# Patient Record
Sex: FEMALE | Race: OTHER | HISPANIC OR LATINO | Employment: UNEMPLOYED | URBAN - METROPOLITAN AREA
[De-identification: names, ages, dates, MRNs, and addresses within clinical notes are randomized per-mention and may not be internally consistent; named-entity substitution may affect disease eponyms.]

---

## 2017-01-09 ENCOUNTER — TRANSCRIBE ORDERS (OUTPATIENT)
Dept: ADMINISTRATIVE | Facility: HOSPITAL | Age: 57
End: 2017-01-09

## 2017-01-09 DIAGNOSIS — Z12.31 SCREENING MAMMOGRAM FOR HIGH-RISK PATIENT: Primary | ICD-10-CM

## 2017-01-10 ENCOUNTER — ALLSCRIPTS OFFICE VISIT (OUTPATIENT)
Dept: OTHER | Facility: OTHER | Age: 57
End: 2017-01-10

## 2017-01-10 DIAGNOSIS — M79.10 MYALGIA: ICD-10-CM

## 2017-01-10 DIAGNOSIS — M54.12 RADICULOPATHY OF CERVICAL REGION: ICD-10-CM

## 2017-02-10 ENCOUNTER — HOSPITAL ENCOUNTER (OUTPATIENT)
Dept: MAMMOGRAPHY | Facility: HOSPITAL | Age: 57
Discharge: HOME | End: 2017-02-10
Payer: COMMERCIAL

## 2017-02-10 DIAGNOSIS — Z12.31 SCREENING MAMMOGRAM FOR HIGH-RISK PATIENT: ICD-10-CM

## 2017-02-10 PROCEDURE — G0202 SCR MAMMO BI INCL CAD: HCPCS

## 2017-02-15 ENCOUNTER — GENERIC CONVERSION - ENCOUNTER (OUTPATIENT)
Dept: OTHER | Facility: OTHER | Age: 57
End: 2017-02-15

## 2018-01-13 VITALS
HEART RATE: 64 BPM | SYSTOLIC BLOOD PRESSURE: 110 MMHG | DIASTOLIC BLOOD PRESSURE: 60 MMHG | HEIGHT: 66 IN | BODY MASS INDEX: 27.64 KG/M2 | WEIGHT: 172 LBS | RESPIRATION RATE: 14 BRPM

## 2018-05-03 LAB
BILIRUB UR QL STRIP: NEGATIVE MG/DL
CLARITY UR: CLEAR
COLOR UR: YELLOW
COMMENT (HISTORICAL): NORMAL
GLUCOSE UR STRIP-MCNC: NEGATIVE MG/DL
HGB UR QL STRIP.AUTO: NEGATIVE
KETONES UR STRIP-MCNC: NEGATIVE MG/DL
LEUKOCYTE ESTERASE UR QL STRIP: NEGATIVE
NITRITE UR QL STRIP: NEGATIVE
PH UR STRIP.AUTO: 5 [PH] (ref 4.5–8)
PROT UR STRIP-MCNC: NEGATIVE MG/DL
SP GR UR STRIP.AUTO: 1.02 (ref 1–1.04)
UROBILINOGEN UR QL STRIP.AUTO: NEGATIVE MG/DL (ref 0–1)

## 2018-07-02 ENCOUNTER — TRANSCRIBE ORDERS (OUTPATIENT)
Dept: ADMINISTRATIVE | Facility: HOSPITAL | Age: 58
End: 2018-07-02

## 2018-07-02 DIAGNOSIS — Z12.31 VISIT FOR SCREENING MAMMOGRAM: Primary | ICD-10-CM

## 2018-07-05 ENCOUNTER — HOSPITAL ENCOUNTER (OUTPATIENT)
Dept: MAMMOGRAPHY | Facility: HOSPITAL | Age: 58
Discharge: HOME | End: 2018-07-05
Payer: COMMERCIAL

## 2018-07-05 DIAGNOSIS — Z12.31 VISIT FOR SCREENING MAMMOGRAM: ICD-10-CM

## 2018-07-05 PROCEDURE — 77067 SCR MAMMO BI INCL CAD: CPT

## 2018-12-09 PROBLEM — M79.18 CERVICAL MYOFASCIAL PAIN SYNDROME: Status: ACTIVE | Noted: 2017-01-10

## 2018-12-09 PROBLEM — Z79.1 ENCOUNTER FOR MONITORING CHRONIC NSAID THERAPY: Status: ACTIVE | Noted: 2017-07-06

## 2018-12-09 PROBLEM — T39.395A GASTRITIS DUE TO NONSTEROIDAL ANTI-INFLAMMATORY DRUG: Status: ACTIVE | Noted: 2017-07-06

## 2018-12-09 PROBLEM — Z51.81 ENCOUNTER FOR MONITORING CHRONIC NSAID THERAPY: Status: ACTIVE | Noted: 2017-07-06

## 2018-12-09 PROBLEM — M48.02 CERVICAL STENOSIS OF SPINAL CANAL: Status: ACTIVE | Noted: 2017-01-10

## 2018-12-09 PROBLEM — K29.60 GASTRITIS DUE TO NONSTEROIDAL ANTI-INFLAMMATORY DRUG: Status: ACTIVE | Noted: 2017-07-06

## 2018-12-10 ENCOUNTER — OFFICE VISIT (OUTPATIENT)
Dept: FAMILY MEDICINE CLINIC | Facility: CLINIC | Age: 58
End: 2018-12-10
Payer: COMMERCIAL

## 2018-12-10 VITALS
DIASTOLIC BLOOD PRESSURE: 70 MMHG | HEIGHT: 64 IN | WEIGHT: 173 LBS | SYSTOLIC BLOOD PRESSURE: 120 MMHG | BODY MASS INDEX: 29.53 KG/M2 | TEMPERATURE: 98.2 F | HEART RATE: 78 BPM | OXYGEN SATURATION: 99 %

## 2018-12-10 DIAGNOSIS — Z28.21 IMMUNIZATION REFUSED: ICD-10-CM

## 2018-12-10 DIAGNOSIS — R29.898 WEAKNESS OF RIGHT ARM: ICD-10-CM

## 2018-12-10 DIAGNOSIS — Z23 NEED FOR SHINGLES VACCINE: ICD-10-CM

## 2018-12-10 DIAGNOSIS — E66.3 OVERWEIGHT (BMI 25.0-29.9): ICD-10-CM

## 2018-12-10 DIAGNOSIS — E78.2 MIXED HYPERLIPIDEMIA: ICD-10-CM

## 2018-12-10 DIAGNOSIS — Z23 NEED FOR INFLUENZA VACCINATION: ICD-10-CM

## 2018-12-10 DIAGNOSIS — Z00.01 ENCOUNTER FOR WELL ADULT EXAM WITH ABNORMAL FINDINGS: Primary | ICD-10-CM

## 2018-12-10 DIAGNOSIS — Z23 NEED FOR TDAP VACCINATION: ICD-10-CM

## 2018-12-10 PROCEDURE — 99214 OFFICE O/P EST MOD 30 MIN: CPT | Performed by: FAMILY MEDICINE

## 2018-12-10 PROCEDURE — 1036F TOBACCO NON-USER: CPT | Performed by: FAMILY MEDICINE

## 2018-12-10 PROCEDURE — 99386 PREV VISIT NEW AGE 40-64: CPT | Performed by: FAMILY MEDICINE

## 2018-12-10 PROCEDURE — 3008F BODY MASS INDEX DOCD: CPT | Performed by: FAMILY MEDICINE

## 2018-12-10 RX ORDER — LEVOTHYROXINE SODIUM 0.12 MG/1
TABLET ORAL EVERY 24 HOURS
COMMUNITY
Start: 2016-10-07 | End: 2019-07-22 | Stop reason: SDUPTHER

## 2018-12-10 RX ORDER — ALBUTEROL SULFATE 90 UG/1
AEROSOL, METERED RESPIRATORY (INHALATION)
COMMUNITY
Start: 2014-04-21 | End: 2019-04-15 | Stop reason: SDUPTHER

## 2018-12-10 NOTE — PROGRESS NOTES
[facility] HEALTH MAINTENANCE OFFICE VISIT  [facility] [place] PRIMARY [facility]    NAME: Joshua Beasley  AGE: 60 y o  SEX: female  : 1960     DATE: 12/10/2018    Assessment and Plan     Problem List Items Addressed This Visit     Mixed hyperlipidemia    Relevant Orders    Comprehensive metabolic panel    Immunization refused     Patient refuses Tdap and Shingrix         Overweight (BMI 25 0-29  9)     The BMI is above average  BMI counseling and education was provided to the patient  Nutrition recommendations include reducing portion sizes, decreasing overall calorie intake, 3-5 servings of fruits/vegetables daily, reducing fast food intake, consuming healthier snacks, decreasing soda and/or juice intake, moderation in carbohydrate intake and reducing intake of saturated fat and trans fat  Exercise recommendations include moderate aerobic physical activity for 150 minutes/week, exercising 3-5 times per week and joining a gym  Encounter for well adult exam with abnormal findings - Primary     Advice and education were given regarding nutrition, aerobic exercises, weight bearing exercises, cardiovascular risk reduction, fall risk reduction, and age appropriate supplements  The patient was counseled regarding instructions for management, risk factor reductions, prognosis, risks and benefits of treatment options, patient and family education, and importance of compliance with treatment                Weakness of right arm     symptomatic abnormal finding on the physical exam the patient does have history of for chronic neck pain  We discussed with the patient workup we can do and EMG study of the right upper extremity will rule out Lyme disease which check her B12 ,LUIS , TSH the will order also x-ray of the cervical spine          Relevant Orders    CBC and differential    Comprehensive metabolic panel    Lipid Panel with Direct LDL reflex    Vitamin D 25 hydroxy    TSH, 3rd generation with Free T4 reflex    Vitamin B12    Lyme Antibody Profile with reflex to WB    HIV 1/2 AG-AB combo    LUIS Screen w/ Reflex to Titer/Pattern    EMG 1 Limb    XR spine cervical complete 4 or 5 vw non injury      Other Visit Diagnoses     Need for influenza vaccination        Relevant Orders    SYRINGE/SINGLE-DOSE VIAL: influenza vaccine, 0708-2111, quadrivalent, 0 5 mL, preservative-free, for patients 3+ yr (FLUZONE)    Need for shingles vaccine        Need for Tdap vaccination                · Patient Counseling:   · Nutrition: Stressed importance of a well balanced diet, moderation of sodium/saturated fat, caloric balance and sufficient intake of fiber  · Exercise: Stressed the importance of regular exercise with a goal of 150 minutes per week  · Dental Health: Discussed daily flossing and brushing and regular dental visits     · Immunizations reviewed patient declined a flu shot for today also decline the Tdap and the shingles vaccine  · Discussed benefits of screening patient is up-to-date with her colonoscopy done 2015 also patient seen by gyn and she is up-to-date with her mammogram and she did have the hysterectomy no more Pap smear  BMI Counseling: Body mass index is 29 7 kg/m²  Discussed with patient's BMI with her             Chief Complaint     Chief Complaint   Patient presents with    Physical Exam    Extremity Weakness     Her right upper extremity       History of Present Illness     Patient here for her annual physical exam and concerned about weakness in her right arm  Patient today deny any chest pain short of breath no palpitation no headache no blurred vision no weakness or lateralized of the symptom no abdomen pain nausea vomiting or diarrhea no renal problem no rash no fever no change in the weight and no change in the mood and she is up-to-date with her mammogram and up-to-date with her colonoscopy and she does do regular diet she does not do exercise frequently she is not smoker        Well Adult Physical   Patient here for a comprehensive physical exam       Diet and Physical Activity  Diet: regular  Weight concerns: Patient is overweight (BMI 25 0-29  9)  Exercise: infrequently      Depression Screen  PHQ-9 Depression Screening    PHQ-9:    Frequency of the following problems over the past two weeks:       Little interest or pleasure in doing things:  0 - not at all  Feeling down, depressed, or hopeless:  0 - not at all  PHQ-2 Score:  0          General Health  Hearing: Normal:  bilateral  Vision: wears glasses  Dental: regular dental visits    Reproductive Health   the patient she had a hysterectomy she is not sure if it is a partial hysterectomy patient with Dr Sreedhar Root    The following portions of the patient's history were reviewed and updated as appropriate: allergies, current medications, past family history, past medical history, past social history, past surgical history and problem list     Review of Systems     Review of Systems   Constitutional: Negative for fatigue and fever  HENT: Negative for ear pain, sinus pain, sinus pressure and sore throat  Eyes: Negative for pain and redness  Respiratory: Negative for cough, chest tightness and shortness of breath  Cardiovascular: Negative for chest pain, palpitations and leg swelling  Gastrointestinal: Negative for abdominal pain, blood in stool, constipation, diarrhea and nausea  Genitourinary: Negative for flank pain, frequency and hematuria  Musculoskeletal: Negative for back pain and joint swelling  Skin: Negative for rash  Neurological: Positive for weakness  Negative for dizziness, seizures, numbness and headaches  Hematological: Does not bruise/bleed easily  Psychiatric/Behavioral: Negative for agitation         Past Medical History     Past Medical History:   Diagnosis Date    Asthma     Chronic kidney disease     Hypertension     Kidney stone     Osteoarthritis     Thyroid disease Past Surgical History     Past Surgical History:   Procedure Laterality Date    APPENDECTOMY       SECTION      CHOLECYSTECTOMY         Social History     Social History     Social History    Marital status: /Civil Union     Spouse name: N/A    Number of children: N/A    Years of education: N/A     Social History Main Topics    Smoking status: Never Smoker    Smokeless tobacco: Never Used      Comment: no passive smoke exposure    Alcohol use No    Drug use: No    Sexual activity: Not Asked     Other Topics Concern    None     Social History Narrative    None       Family History     Family History   Problem Relation Age of Onset    Emphysema Mother        Current Medications       Current Outpatient Prescriptions:     albuterol (PROVENTIL HFA,VENTOLIN HFA) 90 mcg/act inhaler, 3 TIMES WEEKLY AS NEEDED, Disp: , Rfl:     levothyroxine 125 mcg tablet, every 24 hours, Disp: , Rfl:      Allergies     No Known Allergies    Objective     /70   Pulse 78   Temp 98 2 °F (36 8 °C) (Oral)   Ht 5' 4" (1 626 m)   Wt 78 5 kg (173 lb)   SpO2 99%   Breastfeeding? No   BMI 29 70 kg/m²      Physical Exam   Constitutional: She is oriented to person, place, and time  She appears well-developed and well-nourished  HENT:   Head: Normocephalic  Right Ear: External ear normal    Left Ear: External ear normal    Eyes: Conjunctivae and EOM are normal  Right eye exhibits no discharge  Left eye exhibits no discharge  Neck: No JVD present  Cardiovascular: Normal rate, regular rhythm and normal heart sounds  Exam reveals no gallop  No murmur heard  Pulmonary/Chest: Effort normal  No respiratory distress  She has no wheezes  She has no rales  She exhibits no tenderness  Abdominal: She exhibits no mass  There is no tenderness  There is no rebound  Musculoskeletal: She exhibits tenderness  She exhibits no edema     Positive tenderness in the cervical spine at the C6-7 Spurling test is positive   Neurological: She is alert and oriented to person, place, and time  She displays normal reflexes  No cranial nerve deficit  She exhibits abnormal muscle tone  Coordination normal    Muscle weakness in R upper extremity 3/5    Skin: No rash noted  No erythema  Health Maintenance     Health Maintenance   Topic Date Due    DTaP,Tdap,and Td Vaccines (1 - Tdap) 03/24/1980    INFLUENZA VACCINE  12/10/2019 (Originally 7/1/2018)    Depression Screening PHQ  12/10/2019    Hepatitis C Screening  Completed     There is no immunization history for the selected administration types on file for this patient  Trinh Longoria MD  [place] [facility]    BMI Counseling: Body mass index is 29 7 kg/m²  Discussed the patient's BMI with her  The BMI is above average  BMI counseling and education was provided to the patient  Nutrition recommendations include reducing portion sizes, decreasing overall calorie intake, 3-5 servings of fruits/vegetables daily, reducing fast food intake, consuming healthier snacks, decreasing soda and/or juice intake, moderation in carbohydrate intake and reducing intake of cholesterol  Exercise recommendations include exercising 3-5 times per week

## 2018-12-10 NOTE — PATIENT INSTRUCTIONS

## 2018-12-11 NOTE — ASSESSMENT & PLAN NOTE
Advice and education were given regarding nutrition, aerobic exercises, weight bearing exercises, cardiovascular risk reduction, fall risk reduction, and age appropriate supplements  The patient was counseled regarding instructions for management, risk factor reductions, prognosis, risks and benefits of treatment options, patient and family education, and importance of compliance with treatment

## 2018-12-11 NOTE — ASSESSMENT & PLAN NOTE
The BMI is above average  BMI counseling and education was provided to the patient  Nutrition recommendations include reducing portion sizes, decreasing overall calorie intake, 3-5 servings of fruits/vegetables daily, reducing fast food intake, consuming healthier snacks, decreasing soda and/or juice intake, moderation in carbohydrate intake and reducing intake of saturated fat and trans fat  Exercise recommendations include moderate aerobic physical activity for 150 minutes/week, exercising 3-5 times per week and joining a gym

## 2018-12-11 NOTE — PROGRESS NOTES
Subjective:   Chief Complaint   Patient presents with    Physical Exam    Extremity Weakness     Her right upper extremity        Patient ID: Taylor Merida is a 60 y o  female  Patient and office for her annual physical exam and she concerned about the right upper extremity weakness has been going on for more than 6 months and is getting worse gradually and she feel it coming from the neck down and no numbness positive for weakness no rash she did not drop the any object from her hand no difficulty buckling her shirt , and deny any  fall or trauma no headache no blurred vision no double vision the patient does have history of for chronic neck pain no fever no weight change        The following portions of the patient's history were reviewed and updated as appropriate: allergies, current medications, past family history, past medical history, past social history, past surgical history and problem list     Review of Systems   Constitutional: Negative for fatigue  HENT: Negative for ear pain, sinus pain, sinus pressure and sore throat  Eyes: Negative for pain and redness  Respiratory: Negative for cough, chest tightness and shortness of breath  Cardiovascular: Negative for chest pain, palpitations and leg swelling  Gastrointestinal: Negative for abdominal pain, blood in stool, constipation, diarrhea and nausea  Genitourinary: Negative for flank pain, frequency and hematuria  Musculoskeletal: Negative for back pain and joint swelling  Skin: Negative for rash  Neurological: Positive for weakness  Negative for dizziness and headaches  Hematological: Does not bruise/bleed easily  Objective:  Vitals:    12/10/18 1521   BP: 120/70   Pulse: 78   Temp: 98 2 °F (36 8 °C)   TempSrc: Oral   SpO2: 99%   Weight: 78 5 kg (173 lb)   Height: 5' 4" (1 626 m)      Physical Exam   Constitutional: She is oriented to person, place, and time  She appears well-developed and well-nourished     HENT: Head: Normocephalic  Right Ear: External ear normal    Left Ear: External ear normal    Eyes: Conjunctivae and EOM are normal  Right eye exhibits no discharge  Left eye exhibits no discharge  Neck: No JVD present  Cardiovascular: Normal rate, regular rhythm and normal heart sounds  Exam reveals no gallop  No murmur heard  Pulmonary/Chest: Effort normal  No respiratory distress  She has no wheezes  She has no rales  She exhibits no tenderness  Abdominal: She exhibits no mass  There is no tenderness  There is no rebound  Musculoskeletal: She exhibits tenderness  She exhibits no edema  Positive tenderness on the cervical spine C6 and 7 Spurling test is positive   Neurological: She is alert and oriented to person, place, and time  She displays normal reflexes  No cranial nerve deficit  She exhibits abnormal muscle tone  Coordination normal    And the right upper extremity muscle tone 3/5   Skin: No rash noted  No erythema  Assessment/Plan:    Immunization refused  Patient refuses Tdap and Shingrix    Overweight (BMI 25 0-29  9)  The BMI is above average  BMI counseling and education was provided to the patient  Nutrition recommendations include reducing portion sizes, decreasing overall calorie intake, 3-5 servings of fruits/vegetables daily, reducing fast food intake, consuming healthier snacks, decreasing soda and/or juice intake, moderation in carbohydrate intake and reducing intake of saturated fat and trans fat  Exercise recommendations include moderate aerobic physical activity for 150 minutes/week, exercising 3-5 times per week and joining a gym  Encounter for well adult exam with abnormal findings  Advice and education were given regarding nutrition, aerobic exercises, weight bearing exercises, cardiovascular risk reduction, fall risk reduction, and age appropriate supplements   The patient was counseled regarding instructions for management, risk factor reductions, prognosis, risks and benefits of treatment options, patient and family education, and importance of compliance with treatment  Weakness of right arm  symptomatic abnormal finding on the physical exam the patient does have history of for chronic neck pain  We discussed with the patient workup we can do and EMG study of the right upper extremity will rule out Lyme disease which check her B12 ,LUIS , TSH the will order also x-ray of the cervical spine        Diagnoses and all orders for this visit:    Encounter for well adult exam with abnormal findings    Overweight (BMI 25 0-29  9)    Need for influenza vaccination  -     SYRINGE/SINGLE-DOSE VIAL: influenza vaccine, 5641-7540, quadrivalent, 0 5 mL, preservative-free, for patients 3+ yr (FLUZONE)    Need for shingles vaccine    Weakness of right arm  -     CBC and differential; Future  -     Comprehensive metabolic panel; Future  -     Lipid Panel with Direct LDL reflex; Future  -     Vitamin D 25 hydroxy; Future  -     TSH, 3rd generation with Free T4 reflex; Future  -     Vitamin B12; Future  -     Lyme Antibody Profile with reflex to WB; Future  -     HIV 1/2 AG-AB combo; Future  -     LUIS Screen w/ Reflex to Titer/Pattern; Future  -     EMG 1 Limb; Future  -     XR spine cervical complete 4 or 5 vw non injury; Future    Need for Tdap vaccination    Immunization refused    Mixed hyperlipidemia  -     Comprehensive metabolic panel;  Future    Other orders  -     albuterol (PROVENTIL HFA,VENTOLIN HFA) 90 mcg/act inhaler; 3 TIMES WEEKLY AS NEEDED  -     levothyroxine 125 mcg tablet; every 24 hours

## 2018-12-11 NOTE — ASSESSMENT & PLAN NOTE
symptomatic abnormal finding on the physical exam the patient does have history of for chronic neck pain  We discussed with the patient workup we can do and EMG study of the right upper extremity will rule out Lyme disease which check her B12 ,LUIS , TSH the will order also x-ray of the cervical spine

## 2018-12-13 ENCOUNTER — HOSPITAL ENCOUNTER (OUTPATIENT)
Dept: RADIOLOGY | Facility: HOSPITAL | Age: 58
Discharge: HOME | End: 2018-12-13
Payer: COMMERCIAL

## 2018-12-13 ENCOUNTER — LAB (OUTPATIENT)
Dept: LAB | Facility: HOSPITAL | Age: 58
End: 2018-12-13
Payer: COMMERCIAL

## 2018-12-13 DIAGNOSIS — R29.898 WEAKNESS OF RIGHT ARM: ICD-10-CM

## 2018-12-13 DIAGNOSIS — E78.2 MIXED HYPERLIPIDEMIA: ICD-10-CM

## 2018-12-13 LAB
25(OH)D3 SERPL-MCNC: 18.2 NG/ML (ref 30–100)
ALBUMIN SERPL BCP-MCNC: 3.8 G/DL (ref 3.5–5)
ALP SERPL-CCNC: 96 U/L (ref 46–116)
ALT SERPL W P-5'-P-CCNC: 30 U/L (ref 12–78)
ANION GAP SERPL CALCULATED.3IONS-SCNC: 11 MMOL/L (ref 4–13)
AST SERPL W P-5'-P-CCNC: 24 U/L (ref 5–45)
BASOPHILS # BLD AUTO: 0.02 THOUSANDS/ΜL (ref 0–0.1)
BASOPHILS NFR BLD AUTO: 0 % (ref 0–1)
BILIRUB SERPL-MCNC: 0.65 MG/DL (ref 0.2–1)
BUN SERPL-MCNC: 17 MG/DL (ref 5–25)
CALCIUM SERPL-MCNC: 9.4 MG/DL (ref 8.3–10.1)
CHLORIDE SERPL-SCNC: 102 MMOL/L (ref 100–108)
CHOLEST SERPL-MCNC: 222 MG/DL (ref 50–200)
CO2 SERPL-SCNC: 27 MMOL/L (ref 21–32)
CREAT SERPL-MCNC: 0.8 MG/DL (ref 0.6–1.3)
EOSINOPHIL # BLD AUTO: 0.22 THOUSAND/ΜL (ref 0–0.61)
EOSINOPHIL NFR BLD AUTO: 3 % (ref 0–6)
ERYTHROCYTE [DISTWIDTH] IN BLOOD BY AUTOMATED COUNT: 12 % (ref 11.6–15.1)
GFR SERPL CREATININE-BSD FRML MDRD: 81 ML/MIN/1.73SQ M
GLUCOSE P FAST SERPL-MCNC: 128 MG/DL (ref 65–99)
HCT VFR BLD AUTO: 46 % (ref 34.8–46.1)
HDLC SERPL-MCNC: 50 MG/DL (ref 40–60)
HGB BLD-MCNC: 15.4 G/DL (ref 11.5–15.4)
IMM GRANULOCYTES # BLD AUTO: 0.01 THOUSAND/UL (ref 0–0.2)
IMM GRANULOCYTES NFR BLD AUTO: 0 % (ref 0–2)
LDLC SERPL CALC-MCNC: 148 MG/DL (ref 0–100)
LYMPHOCYTES # BLD AUTO: 2.06 THOUSANDS/ΜL (ref 0.6–4.47)
LYMPHOCYTES NFR BLD AUTO: 32 % (ref 14–44)
MCH RBC QN AUTO: 29.8 PG (ref 26.8–34.3)
MCHC RBC AUTO-ENTMCNC: 33.5 G/DL (ref 31.4–37.4)
MCV RBC AUTO: 89 FL (ref 82–98)
MONOCYTES # BLD AUTO: 0.4 THOUSAND/ΜL (ref 0.17–1.22)
MONOCYTES NFR BLD AUTO: 6 % (ref 4–12)
NEUTROPHILS # BLD AUTO: 3.68 THOUSANDS/ΜL (ref 1.85–7.62)
NEUTS SEG NFR BLD AUTO: 59 % (ref 43–75)
NRBC BLD AUTO-RTO: 0 /100 WBCS
PLATELET # BLD AUTO: 241 THOUSANDS/UL (ref 149–390)
PMV BLD AUTO: 10.7 FL (ref 8.9–12.7)
POTASSIUM SERPL-SCNC: 3.8 MMOL/L (ref 3.5–5.3)
PROT SERPL-MCNC: 7.7 G/DL (ref 6.4–8.2)
RBC # BLD AUTO: 5.16 MILLION/UL (ref 3.81–5.12)
SODIUM SERPL-SCNC: 140 MMOL/L (ref 136–145)
TRIGL SERPL-MCNC: 121 MG/DL
TSH SERPL DL<=0.05 MIU/L-ACNC: 1.87 UIU/ML (ref 0.36–3.74)
VIT B12 SERPL-MCNC: 699 PG/ML (ref 100–900)
WBC # BLD AUTO: 6.39 THOUSAND/UL (ref 4.31–10.16)

## 2018-12-13 PROCEDURE — 82306 VITAMIN D 25 HYDROXY: CPT

## 2018-12-13 PROCEDURE — 82607 VITAMIN B-12: CPT

## 2018-12-13 PROCEDURE — 80061 LIPID PANEL: CPT

## 2018-12-13 PROCEDURE — 87389 HIV-1 AG W/HIV-1&-2 AB AG IA: CPT

## 2018-12-13 PROCEDURE — 86038 ANTINUCLEAR ANTIBODIES: CPT

## 2018-12-13 PROCEDURE — 84443 ASSAY THYROID STIM HORMONE: CPT

## 2018-12-13 PROCEDURE — 85025 COMPLETE CBC W/AUTO DIFF WBC: CPT

## 2018-12-13 PROCEDURE — 80053 COMPREHEN METABOLIC PANEL: CPT

## 2018-12-13 PROCEDURE — 36415 COLL VENOUS BLD VENIPUNCTURE: CPT

## 2018-12-13 PROCEDURE — 86618 LYME DISEASE ANTIBODY: CPT

## 2018-12-13 PROCEDURE — 86039 ANTINUCLEAR ANTIBODIES (ANA): CPT

## 2018-12-13 PROCEDURE — 72050 X-RAY EXAM NECK SPINE 4/5VWS: CPT

## 2018-12-14 DIAGNOSIS — E55.9 VITAMIN D DEFICIENCY: Primary | ICD-10-CM

## 2018-12-14 LAB
ANA HOMOGEN SER QL IF: NORMAL
ANA HOMOGEN TITR SER: NORMAL {TITER}
HIV 1+2 AB+HIV1 P24 AG SERPL QL IA: NORMAL
RYE IGE QN: POSITIVE

## 2018-12-14 RX ORDER — ERGOCALCIFEROL 1.25 MG/1
50000 CAPSULE ORAL WEEKLY
Qty: 12 CAPSULE | Refills: 0 | Status: SHIPPED | OUTPATIENT
Start: 2018-12-14 | End: 2019-04-15 | Stop reason: ALTCHOICE

## 2018-12-14 NOTE — TELEPHONE ENCOUNTER
Pt aware med sent for sign off       ----- Message from Trinh Longoria MD sent at 12/13/2018  4:55 PM EST -----  Need vitamin-D 14436 International Units once a week for 12 week

## 2018-12-15 LAB
B BURGDOR IGG SER IA-ACNC: 0.24
B BURGDOR IGM SER IA-ACNC: 0.29

## 2018-12-17 ENCOUNTER — TELEPHONE (OUTPATIENT)
Dept: FAMILY MEDICINE CLINIC | Facility: CLINIC | Age: 58
End: 2018-12-17

## 2018-12-17 DIAGNOSIS — R76.8 POSITIVE ANA (ANTINUCLEAR ANTIBODY): Primary | ICD-10-CM

## 2018-12-17 NOTE — TELEPHONE ENCOUNTER
Left message for patient faxed information over to [facility] KRISTEN ANTUNEZ

## 2018-12-17 NOTE — TELEPHONE ENCOUNTER
----- Message from Paty Brown MD sent at 12/17/2018 12:47 PM EST -----  Patient has positive LUIS ,will refer to Rheumatology

## 2019-01-14 ENCOUNTER — OFFICE VISIT (OUTPATIENT)
Dept: FAMILY MEDICINE CLINIC | Facility: CLINIC | Age: 59
End: 2019-01-14
Payer: COMMERCIAL

## 2019-01-14 VITALS
DIASTOLIC BLOOD PRESSURE: 72 MMHG | RESPIRATION RATE: 16 BRPM | BODY MASS INDEX: 29.88 KG/M2 | HEART RATE: 82 BPM | SYSTOLIC BLOOD PRESSURE: 130 MMHG | HEIGHT: 64 IN | OXYGEN SATURATION: 98 % | WEIGHT: 175 LBS

## 2019-01-14 DIAGNOSIS — K43.9 VENTRAL HERNIA WITHOUT OBSTRUCTION OR GANGRENE: ICD-10-CM

## 2019-01-14 DIAGNOSIS — E55.9 VITAMIN D DEFICIENCY: ICD-10-CM

## 2019-01-14 DIAGNOSIS — E78.2 MIXED HYPERLIPIDEMIA: ICD-10-CM

## 2019-01-14 DIAGNOSIS — E03.9 ACQUIRED HYPOTHYROIDISM: Primary | ICD-10-CM

## 2019-01-14 DIAGNOSIS — R73.01 IFG (IMPAIRED FASTING GLUCOSE): ICD-10-CM

## 2019-01-14 DIAGNOSIS — R76.8 ANA POSITIVE: ICD-10-CM

## 2019-01-14 DIAGNOSIS — Z23 NEED FOR TDAP VACCINATION: ICD-10-CM

## 2019-01-14 PROCEDURE — 90471 IMMUNIZATION ADMIN: CPT | Performed by: FAMILY MEDICINE

## 2019-01-14 PROCEDURE — 90715 TDAP VACCINE 7 YRS/> IM: CPT | Performed by: FAMILY MEDICINE

## 2019-01-14 PROCEDURE — 99214 OFFICE O/P EST MOD 30 MIN: CPT | Performed by: FAMILY MEDICINE

## 2019-01-14 RX ORDER — ATORVASTATIN CALCIUM 10 MG/1
10 TABLET, FILM COATED ORAL DAILY
Qty: 30 TABLET | Refills: 2 | Status: SHIPPED | OUTPATIENT
Start: 2019-01-14 | End: 2019-04-02 | Stop reason: SDUPTHER

## 2019-01-14 NOTE — PROGRESS NOTES
Subjective:   Chief Complaint   Patient presents with    Follow-up     chronic conditions        Patient ID: Talita Jackson is a 60 y o  female  Patient and office follow-up with a chronic condition patient's history of hypothyroidism on levothyroxine 125 mcg once a day asymptomatic deny any and dry skin and no thinning hair no intolerance to the and temperature and deny any weight change no mood is stable also patient has history of hyperlipidemia has been trying to controlled by diet deny any chest pain short of breath no palpitation no headache no blurred vision no weakness or lateralized of the symptom patient's history of impaired fasting glucose asymptomatic deny any increased thirsty no increased frequency urination no nausea no dizziness try to control it also with the diet  Patient who had a history of total hysterectomy and she had incision and the old style way across abdomen, she should pain feel bulging when she cough or sneeze toe increase the pressure inside of her abdomen  Recent blood work discussed with the patient show her vitamin-D is very low and LUIS is positive patient does have multiple joint pain         The following portions of the patient's history were reviewed and updated as appropriate: allergies, current medications, past family history, past medical history, past social history, past surgical history and problem list     Review of Systems   Constitutional: Negative for fatigue and fever  HENT: Negative for ear pain, sinus pain, sinus pressure and sore throat  Eyes: Negative for pain and redness  Respiratory: Negative for cough, chest tightness and shortness of breath  Cardiovascular: Negative for chest pain, palpitations and leg swelling  Gastrointestinal: Negative for abdominal pain, blood in stool, constipation, diarrhea and nausea  Genitourinary: Negative for flank pain, frequency and hematuria  Musculoskeletal: Negative for back pain and joint swelling  Skin: Negative for rash  Neurological: Negative for dizziness, numbness and headaches  Hematological: Does not bruise/bleed easily  Objective:  Vitals:    01/14/19 1514   BP: 130/72   BP Location: Left arm   Patient Position: Sitting   Cuff Size: Standard   Pulse: 82   Resp: 16   SpO2: 98%   Weight: 79 4 kg (175 lb)   Height: 5' 4" (1 626 m)      Physical Exam   Constitutional: She is oriented to person, place, and time  She appears well-developed and well-nourished  HENT:   Head: Normocephalic  Right Ear: External ear normal    Left Ear: External ear normal    Eyes: Conjunctivae and EOM are normal  Right eye exhibits no discharge  Left eye exhibits no discharge  Neck: No JVD present  Cardiovascular: Normal rate, regular rhythm and normal heart sounds  Exam reveals no gallop  No murmur heard  Pulmonary/Chest: Effort normal  No respiratory distress  She has no wheezes  She has no rales  She exhibits no tenderness  Abdominal: She exhibits no mass  There is no tenderness  There is no rebound  Palpable bulging on the right side of the surgical incision secondary to total hysterectomy with increase the pressure inside of the abdomen   Musculoskeletal: She exhibits no edema or tenderness  Neurological: She is alert and oriented to person, place, and time  Skin: No rash noted  No erythema           Assessment/Plan:    Hypothyroidism  Chronic fair control asymptomatic continue current dose of levothyroxine 125 mcg once a day proper use of medication possible side effect discussed with the patient    IFG (impaired fasting glucose)  A new onset asymptomatic we discussed with the patient  to diagnosed with diabetic needed to number of the fasting sugar above 126 or we can order hemoglobin A1c today we discussed with the patient important lose weight and important to follow low carb diet    LUIS positive   positive LUIS in patient with a history of multiple joint pain  The patient will be seen by Rheumatology for more workup to come from patient have diagnosis of lupus or not discussed with the patient    Mixed hyperlipidemia  Chronic uncontrolled asymptomatic plan to start patient on atorvastatin 10 mg once a day proper use of medication possible side effect discussed with the patient we discussed with the patient low fat diet and important lose weight    Vitamin D deficiency  Uncontrolled , we did start the patient on vitamin-D 50,000 once a week for 12 week proper use of medication possible side effect discussed with the patient we encouraged patient and to have a vitamin-D rich diet    Ventral hernia without obstruction or gangrene  Symptomatic , patient had history of  total hysterectomy through abdomen   on the physical exam bulging Will Valsalva maneuver plan to do CT scan of the abdomen pelvic R/O ventral hernia at discussed with the patient and to avoid heavy lifting       Diagnoses and all orders for this visit:    Acquired hypothyroidism    Mixed hyperlipidemia  -     atorvastatin (LIPITOR) 10 mg tablet;  Take 1 tablet (10 mg total) by mouth daily    Vitamin D deficiency    IFG (impaired fasting glucose)  -     Hemoglobin A1C; Future    LUIS positive    Ventral hernia without obstruction or gangrene  -     CT abdomen pelvis w wo contrast; Future    Need for Tdap vaccination  -     TDAP VACCINE GREATER THAN OR EQUAL TO 8YO IM

## 2019-01-14 NOTE — ASSESSMENT & PLAN NOTE
Uncontrolled , we did start the patient on vitamin-D 50,000 once a week for 12 week proper use of medication possible side effect discussed with the patient we encouraged patient and to have a vitamin-D rich diet

## 2019-01-14 NOTE — ASSESSMENT & PLAN NOTE
Chronic fair control asymptomatic continue current dose of levothyroxine 125 mcg once a day proper use of medication possible side effect discussed with the patient

## 2019-01-14 NOTE — ASSESSMENT & PLAN NOTE
Chronic uncontrolled asymptomatic plan to start patient on atorvastatin 10 mg once a day proper use of medication possible side effect discussed with the patient we discussed with the patient low fat diet and important lose weight

## 2019-01-14 NOTE — ASSESSMENT & PLAN NOTE
positive LUIS in patient with a history of multiple joint pain  The patient will be seen by Rheumatology for more workup to come from patient have diagnosis of lupus or not discussed with the patient

## 2019-01-14 NOTE — ASSESSMENT & PLAN NOTE
A new onset asymptomatic we discussed with the patient  to diagnosed with diabetic needed to number of the fasting sugar above 126 or we can order hemoglobin A1c today we discussed with the patient important lose weight and important to follow low carb diet

## 2019-01-14 NOTE — ASSESSMENT & PLAN NOTE
Symptomatic , patient had history of  total hysterectomy through abdomen   on the physical exam bulging Will Valsalva maneuver plan to do CT scan of the abdomen pelvic R/O ventral hernia at discussed with the patient and to avoid heavy lifting

## 2019-01-16 ENCOUNTER — APPOINTMENT (OUTPATIENT)
Dept: LAB | Facility: HOSPITAL | Age: 59
End: 2019-01-16
Payer: COMMERCIAL

## 2019-01-16 DIAGNOSIS — R73.01 IFG (IMPAIRED FASTING GLUCOSE): ICD-10-CM

## 2019-01-16 LAB
EST. AVERAGE GLUCOSE BLD GHB EST-MCNC: 148 MG/DL
HBA1C MFR BLD: 6.8 % (ref 4.2–6.3)

## 2019-01-16 PROCEDURE — 83036 HEMOGLOBIN GLYCOSYLATED A1C: CPT

## 2019-01-16 PROCEDURE — 36415 COLL VENOUS BLD VENIPUNCTURE: CPT

## 2019-01-17 DIAGNOSIS — Z12.11 ENCOUNTER FOR SCREENING COLONOSCOPY: Primary | ICD-10-CM

## 2019-01-18 ENCOUNTER — TELEPHONE (OUTPATIENT)
Dept: FAMILY MEDICINE CLINIC | Facility: CLINIC | Age: 59
End: 2019-01-18

## 2019-01-18 DIAGNOSIS — R73.01 IFG (IMPAIRED FASTING GLUCOSE): Primary | ICD-10-CM

## 2019-01-18 NOTE — TELEPHONE ENCOUNTER
----- Message from Kelle Bates MD sent at 1/17/2019  8:00 PM EST -----  Patient HA1c in category of DM ,to start Metformin 500 mg po daily

## 2019-01-29 ENCOUNTER — HOSPITAL ENCOUNTER (OUTPATIENT)
Dept: CT IMAGING | Facility: HOSPITAL | Age: 59
Discharge: HOME | End: 2019-01-29
Payer: COMMERCIAL

## 2019-01-29 DIAGNOSIS — K43.9 VENTRAL HERNIA WITHOUT OBSTRUCTION OR GANGRENE: ICD-10-CM

## 2019-01-29 PROCEDURE — 74176 CT ABD & PELVIS W/O CONTRAST: CPT

## 2019-02-04 ENCOUNTER — OFFICE VISIT (OUTPATIENT)
Dept: FAMILY MEDICINE CLINIC | Facility: CLINIC | Age: 59
End: 2019-02-04
Payer: COMMERCIAL

## 2019-02-04 VITALS
WEIGHT: 170 LBS | BODY MASS INDEX: 29.02 KG/M2 | OXYGEN SATURATION: 98 % | TEMPERATURE: 98.3 F | SYSTOLIC BLOOD PRESSURE: 118 MMHG | HEIGHT: 64 IN | DIASTOLIC BLOOD PRESSURE: 78 MMHG | RESPIRATION RATE: 16 BRPM | HEART RATE: 88 BPM

## 2019-02-04 DIAGNOSIS — Z23 NEED FOR 23-POLYVALENT PNEUMOCOCCAL POLYSACCHARIDE VACCINE: ICD-10-CM

## 2019-02-04 DIAGNOSIS — E11.9 TYPE 2 DIABETES MELLITUS WITHOUT COMPLICATION, WITHOUT LONG-TERM CURRENT USE OF INSULIN (HCC): Primary | ICD-10-CM

## 2019-02-04 DIAGNOSIS — R06.83 SNORING: ICD-10-CM

## 2019-02-04 DIAGNOSIS — K43.9 VENTRAL HERNIA WITHOUT OBSTRUCTION OR GANGRENE: ICD-10-CM

## 2019-02-04 DIAGNOSIS — Z12.4 SCREENING FOR MALIGNANT NEOPLASM OF CERVIX: ICD-10-CM

## 2019-02-04 PROBLEM — R73.01 IFG (IMPAIRED FASTING GLUCOSE): Status: RESOLVED | Noted: 2019-01-14 | Resolved: 2019-02-04

## 2019-02-04 PROCEDURE — 90732 PPSV23 VACC 2 YRS+ SUBQ/IM: CPT | Performed by: FAMILY MEDICINE

## 2019-02-04 PROCEDURE — 3008F BODY MASS INDEX DOCD: CPT | Performed by: FAMILY MEDICINE

## 2019-02-04 PROCEDURE — 90471 IMMUNIZATION ADMIN: CPT | Performed by: FAMILY MEDICINE

## 2019-02-04 PROCEDURE — 99214 OFFICE O/P EST MOD 30 MIN: CPT | Performed by: FAMILY MEDICINE

## 2019-02-05 NOTE — ASSESSMENT & PLAN NOTE
Abdomen hernia was abnormal CT scan of the abdomen and plan to refer the patient to general surgeon and we discussed with the patient avoid heavy lifting

## 2019-02-05 NOTE — ASSESSMENT & PLAN NOTE
Symptomatic we discussed important lose weight  discussed sleep position with send patient to have a sleep study

## 2019-02-05 NOTE — PROGRESS NOTES
Subjective:   Chief Complaint   Patient presents with    Follow-up     chronic conditions        Patient ID: Chaitanya Jones is a 60 y o  female  Patient and office with the  concerned about sleeping problem and the discussed CT scan of the abdomen patient concerned about her sleeping foot has been going on for more than 6 months she had trouble falling asleep and during sleep she has no on sometimes she gasped for air she has no sleep walking no sleep talking and this has been going on to while she deny any and new stressor or any change in her life and does not matter what sleep position patient been trying over-the-counter sleeping aid and is not helping  Recent CT scan of the abdomen it showed she had a abdomen hernia at site of old surgery result discussed with the patient and her  also recent blood work discussed with the patient show her hemoglobin A1c 6 8 buttock degree of diabetic I already called the patient on metformin 500 mg per patient she is not taking it every day she does not feel she had diabetes she asymptomatic no increased frequency urination no thirsty no headache no blurred vision and no dizziness        The following portions of the patient's history were reviewed and updated as appropriate: allergies, current medications, past family history, past medical history, past social history, past surgical history and problem list     Review of Systems   Constitutional: Negative for fatigue and fever  HENT: Negative for ear pain, sinus pain, sinus pressure and sore throat  Eyes: Negative for pain and redness  Respiratory: Negative for cough, chest tightness and shortness of breath  Cardiovascular: Negative for chest pain, palpitations and leg swelling  Gastrointestinal: Negative for abdominal pain, blood in stool, constipation, diarrhea and nausea  Genitourinary: Negative for flank pain, frequency and hematuria     Musculoskeletal: Negative for back pain and joint swelling  Skin: Negative for rash  Neurological: Negative for dizziness, numbness and headaches  Hematological: Does not bruise/bleed easily  Objective:  Vitals:    02/04/19 1521   BP: 118/78   BP Location: Left arm   Patient Position: Sitting   Cuff Size: Large   Pulse: 88   Resp: 16   Temp: 98 3 °F (36 8 °C)   TempSrc: Oral   SpO2: 98%   Weight: 77 1 kg (170 lb)   Height: 5' 4" (1 626 m)      Physical Exam   Constitutional: She is oriented to person, place, and time  She appears well-developed and well-nourished  HENT:   Head: Normocephalic  Right Ear: External ear normal    Left Ear: External ear normal    Eyes: Conjunctivae and EOM are normal  Right eye exhibits no discharge  Left eye exhibits no discharge  Neck: No JVD present  Cardiovascular: Normal rate, regular rhythm and normal heart sounds  Exam reveals no gallop  Pulses are no weak pulses  No murmur heard  Pulses:       Dorsalis pedis pulses are 2+ on the right side, and 2+ on the left side  Pulmonary/Chest: Effort normal  No respiratory distress  She has no wheezes  She has no rales  She exhibits no tenderness  Abdominal: She exhibits no mass  There is no tenderness  There is no rebound  Musculoskeletal: She exhibits no edema or tenderness  Feet:   Right Foot:   Skin Integrity: Negative for warmth  Left Foot:   Skin Integrity: Negative for warmth  Neurological: She is alert and oriented to person, place, and time  Skin: No rash noted  No erythema  Patient's shoes and socks removed  Right Foot/Ankle   Right Foot Inspection  Skin Exam: skin intact no warmth and no pre-ulcer                          Toe Exam: no swelling and erythema  Sensory       Monofilament testing: intact  Vascular  Capillary refills: < 3 seconds  The right DP pulse is 2+       Left Foot/Ankle  Left Foot Inspection  Skin Exam: skin intactno warmth and no pre-ulcer                         Toe Exam: no swelling and no erythema Sensory       Monofilament: intact  Vascular  Capillary refills: < 3 seconds  The left DP pulse is 2+  Assign Risk Category:  No deformity present; No loss of protective sensation; No weak pulses       Risk: 0    Assessment/Plan:    Type 2 diabetes mellitus without complication, without long-term current use of insulin ([facility])  Lab Results   Component Value Date    HGBA1C 6 8 (H) 01/16/2019      new diagnosis start patient on metformin 500 mg once a day proper use of medication possible side effect discussed with the patient also we discussed with her important compliant with the medication also important losing weight and low carb diet script has been given to the patient for Glucometer and test script patient will have the PICC Pneumovax 23 today she already on statin patient does followed by Ophthalmology    Ventral hernia without obstruction or gangrene  Abdomen hernia was abnormal CT scan of the abdomen and plan to refer the patient to general surgeon and we discussed with the patient avoid heavy lifting    Snoring  Symptomatic we discussed important lose weight  discussed sleep position with send patient to have a sleep study       Diagnoses and all orders for this visit:    Type 2 diabetes mellitus without complication, without long-term current use of insulin ([facility] [address] )  -     CBC and differential; Future  -     Comprehensive metabolic panel; Future  -     Hemoglobin A1C; Future  -     Microalbumin / creatinine urine ratio; Future  -     TSH, 3rd generation with Free T4 reflex; Future    Ventral hernia without obstruction or gangrene  -     Ambulatory referral to General Surgery; Future    Screening for malignant neoplasm of cervix  -     Ambulatory referral to Gynecology; Future    Snoring  -     Ambulatory referral to Sleep Medicine;  Future    Need for 23-polyvalent pneumococcal polysaccharide vaccine  -     PNEUMOCOCCAL POLYSACCHARIDE VACCINE 23-VALENT =>3YO SQ IM    Other orders  -     Cancel: Ambulatory referral to General Surgery;  Future

## 2019-02-05 NOTE — ASSESSMENT & PLAN NOTE
Lab Results   Component Value Date    HGBA1C 6 8 (H) 01/16/2019      new diagnosis start patient on metformin 500 mg once a day proper use of medication possible side effect discussed with the patient also we discussed with her important compliant with the medication also important losing weight and low carb diet script has been given to the patient for Glucometer and test script patient will have the PICC Pneumovax 23 today she already on statin patient does followed by Ophthalmology

## 2019-02-06 DIAGNOSIS — E11.9 TYPE 2 DIABETES MELLITUS WITHOUT COMPLICATION, WITHOUT LONG-TERM CURRENT USE OF INSULIN (HCC): Primary | ICD-10-CM

## 2019-02-06 RX ORDER — LANCETS 28 GAUGE
EACH MISCELLANEOUS
Qty: 100 EACH | Refills: 2 | Status: SHIPPED | OUTPATIENT
Start: 2019-02-06 | End: 2019-05-21

## 2019-02-11 ENCOUNTER — CONSULT (OUTPATIENT)
Dept: SURGERY | Facility: CLINIC | Age: 59
End: 2019-02-11
Payer: COMMERCIAL

## 2019-02-11 VITALS
HEIGHT: 64 IN | TEMPERATURE: 97.9 F | BODY MASS INDEX: 29.37 KG/M2 | HEART RATE: 75 BPM | DIASTOLIC BLOOD PRESSURE: 88 MMHG | SYSTOLIC BLOOD PRESSURE: 142 MMHG | RESPIRATION RATE: 16 BRPM | WEIGHT: 172 LBS

## 2019-02-11 DIAGNOSIS — E11.9 TYPE 2 DIABETES MELLITUS WITHOUT COMPLICATION, WITHOUT LONG-TERM CURRENT USE OF INSULIN (HCC): ICD-10-CM

## 2019-02-11 DIAGNOSIS — E66.3 OVERWEIGHT (BMI 25.0-29.9): ICD-10-CM

## 2019-02-11 DIAGNOSIS — K43.2 INCISIONAL HERNIA, WITHOUT OBSTRUCTION OR GANGRENE: Primary | ICD-10-CM

## 2019-02-11 DIAGNOSIS — K43.9 VENTRAL HERNIA WITHOUT OBSTRUCTION OR GANGRENE: ICD-10-CM

## 2019-02-11 PROCEDURE — 99243 OFF/OP CNSLTJ NEW/EST LOW 30: CPT | Performed by: FAMILY MEDICINE

## 2019-02-11 RX ORDER — FAMOTIDINE 20 MG/1
1 TABLET, FILM COATED ORAL EVERY 24 HOURS
COMMUNITY
Start: 2017-01-30 | End: 2019-04-15 | Stop reason: ALTCHOICE

## 2019-02-11 RX ORDER — ACETAMINOPHEN 160 MG
1 TABLET,DISINTEGRATING ORAL EVERY 24 HOURS
COMMUNITY
Start: 2016-10-26 | End: 2019-05-21

## 2019-02-11 RX ORDER — CYCLOBENZAPRINE HCL 5 MG
1 TABLET ORAL EVERY 12 HOURS
COMMUNITY
Start: 2016-10-07 | End: 2019-04-15 | Stop reason: ALTCHOICE

## 2019-02-11 RX ORDER — IBUPROFEN 600 MG/1
1 TABLET ORAL EVERY 8 HOURS PRN
COMMUNITY
Start: 2016-11-16 | End: 2019-07-22 | Stop reason: ALTCHOICE

## 2019-02-11 NOTE — ASSESSMENT & PLAN NOTE
-Asymptomatic  Likely secondary to previous abdominal surgeries  -Recommend elective repair   -Discussed repair in detail with patient, including benefits and risks of repair vs no intervention   -Patient would like to wait as she had partial hysterectomy in 2017 and does not feel ready to have another surgery yet  -Advised patient to return to the office in the near future when she is ready  Discussed that she is unlikely to have complications, but it is possible  Patient is aware of warning signs and reasons to call the office or go to the ER   -Continue to optimize weight loss, physical activity and diabetes management to improve recovery and reduce risk of recurrence

## 2019-02-11 NOTE — PROGRESS NOTES
Patient ID: Kassi Bennett is a 60 y o  female  Chief Complaint: Abdominal hernia    Notes for this visit:    Incisional hernia, without obstruction or gangrene  -Asymptomatic  Likely secondary to previous abdominal surgeries  -Recommend elective repair   -Discussed repair in detail with patient, including benefits and risks of repair vs no intervention   -Patient would like to wait as she had partial hysterectomy in 2017 and does not feel ready to have another surgery yet  -Advised patient to return to the office in the near future when she is ready  Discussed that she is unlikely to have complications, but it is possible  Patient is aware of warning signs and reasons to call the office or go to the ER   -Continue to optimize weight loss, physical activity and diabetes management to improve recovery and reduce risk of recurrence  Kylehans Hernandez was seen today for hernia  Diagnoses and all orders for this visit:    Incisional hernia, without obstruction or gangrene    Overweight (BMI 25 0-29  9)    Type 2 diabetes mellitus without complication, without long-term current use of insulin ([facility])    History:    HPI: 60 F complaining of abdominal hernia at site of previous surgery  Noticed it just over a month ago  Occasionally the area becomes firm and uncomfortable, but most of the time it remains asymptomatic  She is here to find out more about surgical repair  Denies nausea, vomiting or abdominal pain  ROS as below  The following portions of the patient's history were reviewed and updated as appropriate: allergies, past medical history and past surgical history  Review of Systems   Constitutional: Negative for chills, fever and unexpected weight change  HENT: Negative  Eyes: Negative  Respiratory: Negative  Cardiovascular: Negative  Gastrointestinal: Negative for abdominal distention, abdominal pain, anal bleeding, blood in stool, constipation, diarrhea, nausea and vomiting     Endocrine: Negative  Genitourinary: Negative  Musculoskeletal: Negative  Skin: Negative  Allergic/Immunologic: Negative  Neurological: Negative  Hematological: Negative  Psychiatric/Behavioral: Negative  Patient Active Problem List   Diagnosis    Asthma    Cervical myofascial pain syndrome    Cervical stenosis of spinal canal    Degeneration of lumbar intervertebral disc    Encounter for monitoring chronic NSAID therapy    Essential hypertension    Hypothyroidism    Gastritis due to nonsteroidal anti-inflammatory drug    Kidney stone    Mixed hyperlipidemia    Neuralgia    Osteoarthritis    Primary osteoarthritis of left knee    RA (rheumatoid arthritis) ([facility] [place] [address] )    Vitamin D deficiency    Immunization refused    Overweight (BMI 25 0-29  9)    Encounter for well adult exam with abnormal findings    Weakness of right arm    LUIS positive    Incisional hernia, without obstruction or gangrene    Type 2 diabetes mellitus without complication, without long-term current use of insulin ([facility])    Snoring     Current Outpatient Medications:     albuterol (PROVENTIL HFA,VENTOLIN HFA) 90 mcg/act inhaler, 3 TIMES WEEKLY AS NEEDED, Disp: , Rfl:     atorvastatin (LIPITOR) 10 mg tablet, Take 1 tablet (10 mg total) by mouth daily, Disp: 30 tablet, Rfl: 2    Cholecalciferol (VITAMIN D3) 2000 units capsule, 1 tablet every 24 hours, Disp: , Rfl:     ergocalciferol (VITAMIN D2) 50,000 units, Take 1 capsule (50,000 Units total) by mouth once a week, Disp: 12 capsule, Rfl: 0    Lancets (FREESTYLE) lancets, TESTS BID, Disp: 100 each, Rfl: 2    levothyroxine 125 mcg tablet, every 24 hours, Disp: , Rfl:     metFORMIN (GLUCOPHAGE) 500 mg tablet, Take 1 tablet (500 mg total) by mouth daily, Disp: 30 tablet, Rfl: 2    cyclobenzaprine (FLEXERIL) 5 mg tablet, 1 tablet Every 12 hours, Disp: , Rfl:     famotidine (PEPCID) 20 mg tablet, 1 tablet every 24 hours, Disp: , Rfl:     ibuprofen (MOTRIN) 600 mg tablet, 1 tablet 3 (three) times a day, Disp: , Rfl:     Social History     Tobacco Use    Smoking status: Never Smoker    Smokeless tobacco: Never Used    Tobacco comment: no passive smoke exposure   Substance Use Topics    Alcohol use: No       PHYSICAL EXAM    Vitals:   Vitals:    02/11/19 1342   BP: 142/88   BP Location: Right arm   Patient Position: Sitting   Cuff Size: Standard   Pulse: 75   Resp: 16   Temp: 97 9 °F (36 6 °C)   TempSrc: Tympanic   Weight: 78 kg (172 lb)   Height: 5' 4" (1 626 m)       Physical Exam   Constitutional: She is oriented to person, place, and time  She appears well-developed and well-nourished  No distress  HENT:   Head: Normocephalic and atraumatic  Cardiovascular: Normal rate, regular rhythm and normal heart sounds  No murmur heard  Pulmonary/Chest: Effort normal and breath sounds normal  No respiratory distress  She has no wheezes  Abdominal: Soft  Bowel sounds are normal  She exhibits no distension and no mass  There is no tenderness  There is no rebound and no guarding  A hernia (midline, inferior to umbilicus, at site of previous incision, aprox 6 x 4 cm, non-tender, reduced) is present  Neurological: She is alert and oriented to person, place, and time  Nursing note and vitals reviewed

## 2019-03-12 ENCOUNTER — HOSPITAL ENCOUNTER (OUTPATIENT)
Dept: NEUROLOGY | Facility: HOSPITAL | Age: 59
Discharge: HOME | End: 2019-03-12
Payer: COMMERCIAL

## 2019-03-12 DIAGNOSIS — R29.898 WEAKNESS OF RIGHT ARM: ICD-10-CM

## 2019-03-12 PROCEDURE — 95909 NRV CNDJ TST 5-6 STUDIES: CPT | Performed by: PSYCHIATRY & NEUROLOGY

## 2019-03-12 PROCEDURE — 95886 MUSC TEST DONE W/N TEST COMP: CPT | Performed by: PSYCHIATRY & NEUROLOGY

## 2019-03-12 NOTE — PROCEDURES
[facility]  Electromyography and Nerve Conduction Study      Patient Name:  Chaitanya Jones  MRN: [id number]   :  1960 Date performed: 3/12/2019    Age: 60 y o  Consult request by: Joselito Orlando MD      HISTORY:  Chaitanya Jones is a 60 y o  female who complains of numbness, pain, weakness, and tingling in her right upper extremity  This has been present for 3-4 months  However it has been worsening lately  The numbness, tingling, and pain are present intermittently  They do not occur nocturnally  When questioned, she admits to neck pain that radiates into her right shoulder  She admits that the weakness is proportional to the pain  She denies left upper extremity numbness, tingling, weakness, or pain  She is referred to rule out a cervical radiculopathy      Past Medical History:   Diagnosis Date    Asthma     Chronic kidney disease     Hypertension     IFG (impaired fasting glucose) 2019    Kidney stone     Osteoarthritis     Thyroid disease     Type 2 diabetes mellitus without complication, without long-term current use of insulin ([facility]) 2019         Current Outpatient Medications:     albuterol (PROVENTIL HFA,VENTOLIN HFA) 90 mcg/act inhaler, 3 TIMES WEEKLY AS NEEDED, Disp: , Rfl:     atorvastatin (LIPITOR) 10 mg tablet, Take 1 tablet (10 mg total) by mouth daily, Disp: 30 tablet, Rfl: 2    Cholecalciferol (VITAMIN D3) 2000 units capsule, 1 tablet every 24 hours, Disp: , Rfl:     cyclobenzaprine (FLEXERIL) 5 mg tablet, 1 tablet Every 12 hours, Disp: , Rfl:     ergocalciferol (VITAMIN D2) 50,000 units, Take 1 capsule (50,000 Units total) by mouth once a week, Disp: 12 capsule, Rfl: 0    famotidine (PEPCID) 20 mg tablet, 1 tablet every 24 hours, Disp: , Rfl:     ibuprofen (MOTRIN) 600 mg tablet, 1 tablet 3 (three) times a day, Disp: , Rfl:     Lancets (FREESTYLE) lancets, TESTS BID, Disp: 100 each, Rfl: 2    levothyroxine 125 mcg tablet, every 24 hours, Disp: , Rfl:     metFORMIN (GLUCOPHAGE) 500 mg tablet, Take 1 tablet (500 mg total) by mouth daily, Disp: 30 tablet, Rfl: 2    PHYSICAL EXAMINATION:  In general, she was in no acute distress  Upper extremity power was grade 5 bilaterally  Pinprick sensation was decreased over the medial and lateral aspects of the upper arm on the right relative to the left, rating as 80% of that on the left  Upper extremity reflexes were bilaterally symmetric and within normal limits  RESULTS:  EMG/NCS data and waveforms that were performed for this study have been scanned into Epic  Please refer to scanned document for waveforms  IMPRESSION:   This is a normal study  There is no electrophysiologic evidence of a cervical radiculopathy on the right, the patient's symptomatic side  There is no electrophysiologic evidence of a median neuropathy at the right wrist     Clinical correlation is advised      Milton Pagan MD

## 2019-03-13 ENCOUNTER — TELEPHONE (OUTPATIENT)
Dept: FAMILY MEDICINE CLINIC | Facility: CLINIC | Age: 59
End: 2019-03-13

## 2019-03-21 ENCOUNTER — TELEPHONE (OUTPATIENT)
Dept: FAMILY MEDICINE CLINIC | Facility: CLINIC | Age: 59
End: 2019-03-21

## 2019-03-21 DIAGNOSIS — E11.9 TYPE 2 DIABETES MELLITUS WITHOUT COMPLICATION, WITHOUT LONG-TERM CURRENT USE OF INSULIN (HCC): Primary | ICD-10-CM

## 2019-03-29 ENCOUNTER — OFFICE VISIT (OUTPATIENT)
Dept: SLEEP CENTER | Facility: CLINIC | Age: 59
End: 2019-03-29
Payer: COMMERCIAL

## 2019-03-29 VITALS
WEIGHT: 167.6 LBS | BODY MASS INDEX: 28.61 KG/M2 | HEIGHT: 64 IN | SYSTOLIC BLOOD PRESSURE: 110 MMHG | HEART RATE: 65 BPM | DIASTOLIC BLOOD PRESSURE: 62 MMHG

## 2019-03-29 DIAGNOSIS — J45.20 MILD INTERMITTENT ASTHMA WITHOUT COMPLICATION: ICD-10-CM

## 2019-03-29 DIAGNOSIS — G47.33 OSA (OBSTRUCTIVE SLEEP APNEA): ICD-10-CM

## 2019-03-29 DIAGNOSIS — G47.09 OTHER INSOMNIA: Primary | ICD-10-CM

## 2019-03-29 DIAGNOSIS — R06.83 SNORING: ICD-10-CM

## 2019-03-29 DIAGNOSIS — E66.3 OVERWEIGHT (BMI 25.0-29.9): ICD-10-CM

## 2019-03-29 DIAGNOSIS — I10 ESSENTIAL HYPERTENSION: ICD-10-CM

## 2019-03-29 DIAGNOSIS — F41.9 ANXIETY: ICD-10-CM

## 2019-03-29 DIAGNOSIS — R00.2 PALPITATIONS: ICD-10-CM

## 2019-03-29 PROCEDURE — 99244 OFF/OP CNSLTJ NEW/EST MOD 40: CPT | Performed by: INTERNAL MEDICINE

## 2019-03-29 RX ORDER — TRAZODONE HYDROCHLORIDE 50 MG/1
50 TABLET ORAL
Qty: 30 TABLET | Refills: 1 | Status: SHIPPED | OUTPATIENT
Start: 2019-03-29 | End: 2019-05-21

## 2019-03-29 NOTE — PROGRESS NOTES
Review of Systems      Genitourinary need to urinate more than twice a night   Cardiology palpitations/fluttering feeling in the chest   Gastrointestinal none   Neurology none   Constitutional claustrophobia and fatigue   Integumentary none   Psychiatry none   Musculoskeletal sciatica   Pulmonary none   ENT none   Endocrine none   Hematological none

## 2019-03-29 NOTE — PATIENT INSTRUCTIONS
What you can do to improve your sleep: (Sleep Hygiene) Basic rules for a good night's sleep    Create a regular sleep schedule  This will help you form a sleep routine  Keep a record of your sleep patterns, and any sleeping problems you have  Bring the record to follow-up visits with healthcare providers  Avoid prolonged use of light-emitting screens before bedtime or watching TV in bed  Avoid forcing sleep  Do not take naps  Naps could make it hard for you to fall asleep at bedtime  Deal with your worries before bedtime  Keep your bedroom cool, quiet, and dark  Turn on white noise, such as a fan, to help you relax  Do not use your bed for any activity that will keep you awake  Do not read, exercise, eat, or watch TV in your bedroom  Get up if you do not fall asleep within 20 minutes  Move to another room and do something relaxing until you become sleepy  Limit caffeine, alcohol, nicotine and food to earlier in the day  Only drink caffeine in the morning  Do not drink alcohol within 6 hours of bedtime  Do not eat a heavy meal right before you go to bed  Avoid smoking, especially in the evening  Exercise regularly  Daily exercise will help you sleep better  Do not exercise within 4 hours of bedtime  Stimulus control therapy rules  1  Go to bed only when sleepy  2  Do not watch television, read, eat, or worry while in bed  Use bed only for sleep and sex  3  Get out of bed if unable to fall asleep within 20 minutes and go to another room  Return to bed only when sleepy  Repeat this step as many times as necessary throughout the night  4  Set an alarm clock to wake up at a fixed time each morning, including weekends  5  Do not take a nap during the day  Data from: [address] [place] [address], [employer] Nonpharmacologic treatments of insomnia  J Clin Psychiatry 0126; 53:37  Go to AASM website for more information: Sleepeducation  org     Recommended Reading:  Book by authors Naseem Fuller No More sleepless nights

## 2019-03-29 NOTE — PROGRESS NOTES
Consultation - [facility]  60 y o  female  FTA:9/00/[birth year]24  HMF:[id number]    Physician Requesting Consult: Darya Barron MD     Reason for Consult : At your kind request I saw this patient for initial sleep evaluation today  She is here for a complaint of I can not sleep  She speaks English but her son assisted with interpretation  PFSH, Problem List, Medications & Allergies were reviewed in EMR  She  has a past medical history of Anxiety (3/29/2019), Asthma, Chronic kidney disease, Hypertension, IFG (impaired fasting glucose) (1/14/2019), Kidney stone, Osteoarthritis, Thyroid disease, and Type 2 diabetes mellitus without complication, without long-term current use of insulin ([facility] ) (2/4/2019)  She has a current medication list which includes the following prescription(s): albuterol, vitamin d3, ergocalciferol, freestyle, levothyroxine, metformin, atorvastatin, cyclobenzaprine, famotidine, ibuprofen, and trazodone  HPI:  Sleep difficulties started around 2 years ago when she stopped working and following hysterectomy  Main difficulty is initiating sleep but also difficulty maintaining sleep  She tried various over-the-counter medications with no benefit  She denied snoring but awakens with choking and palpitations  She also reports excessive sweating  Other Complaints:  I feel tired  Dalton Vidal Restless Leg Syndrome: reports no suggestive symptoms   Parasomnia: no features reported   Sleep Routine:   Typical Bedtime:  10:00 p m  Gets OOB:  8:00 a m  TIB:10 hrs  Sleep latency: several hours Sleep Interruptions:frequent x/night every 20 minutes and is unsure of the cause  Awakens: spontaneously  Upon awakening:never feels rested She estimates getting 3-4 hrs sleep  She denied Excessive Daytime Sleepiness but naps daily for up to 30 minutes  [place] Sleepiness Scale rated at Total score: 13 /24  Habits: reports that she has never smoked   She has never used smokeless tobacco , reports that she does not drink alcohol ,  reports that she does not use drugs  ,Caffeine use:none , Exercise routine: regular walking for around 15 minutes  Family History: Negative for sleep disturbance  ROS: reviewed & as attached  Significant for approximately 3 lb intentional weight loss in the past few weeks since recent diagnosis of diabetes  Acid reflux and asthma controlled  She is not reporting pain that disturbs sleep  EXAM:    Vitals /62   Pulse 65   Ht 5' 4" (1 626 m)   Wt 76 kg (167 lb 9 6 oz)   BMI 28 77 kg/m²     General  Well groomed female; appears stated age; no apparent distress  Psychiatric   Speech:  Pressured; Cooperative  Anxious, Irritable, Frustrated    Head   Craniofacial anatomy:normal Sinuses: non- tender  TMJ: Normal     Eyes   EOM's intact;  conjunctiva/corneas clear         Nasal Airway  is patent Septum:central, Mucous membranes:appear normal     Turbinates: normal ;  No Rhinorrhea; No PND  Oral   Airway  crowded Tongue:  ; Modified Mallampati class IV (only hard palate visible)  Hard Palate:high arched; Soft Palate:  redundant soft palate Tonsils:  3+ hypertrophy  Teeth: normal       Neck  appears thick; Neck Circumference: 36 5cm; Supple; no abnormal masses; Thyroid:normal  Trachea:central      Lymph    No Cervical or Submandibular Lymhadenopathy   Heart:   S1,S2 normal;RRR; no gallop; nomurmurs     Lungs   Respiratory Effort:normal  Air entry good bilaterally  No wheezes  No rales   Abdomen   Obese, Soft & non-tender     Extremities   No pedal edema  No clubbing or cyanosis  Skin   Skin is cold and clammy; Color& Hydration good; no facial rashes or lesions    Neurologic  Alert and orientated; CNII-XII intact; Motor normal; Sensation normal    Muscskeltl    Muscle bulk, tone and power WNL Gait:normal   Rombergs Negative  IMPRESSION: Primary Sleep/Secondary(to Medical or Psych conditions) & comorbidities   1   Other insomnia  traZODone (DESYREL) 50 mg tablet   2  MICHEL (obstructive sleep apnea)  Diagnostic Sleep Study   3  Anxiety     4  Mild intermittent asthma without complication     5  Snoring  Ambulatory referral to Sleep Medicine   6  Essential hypertension     7  Palpitations     8  Overweight (BMI 25 0-29  9)          PLAN:   1  Comprehensive counseling was provided on pathophysiology, diagnostic strategies & treatment options; effects on symptoms and comorbidities; risks of inadequate therapy; costs and insurance aspects  2  I advised on weight reduction, avoiding sleeping supine, using alcohol or sedating medications close to bed time and on safe driving practices  3  Cognitive behavioral therapy was initiated with advise on Sleep Hygiene and behavioral techniques to manage Insomnia  Specifically, limiting time in bed to 6 hours or less, keeping a regular routine, increasing the amount of exercise she gets, avoiding daytime naps and on relaxation techniques  Unfortunately, she was not very receptive to my recommendations and had somewhat unrealistic expectation of an immediate fix: " I just want to sleep  4  Nocturnal polysomnography is indicated and a diagnostic study will be scheduled tentatively around 6 weeks from now allowing sufficient time to consolidated sleep  5  I gave her a prescription for trazodone to address her anxiety and will also aid her sleep  6  Follow-up will be scheduled in a month to monitor progress further details of treatment options and to initiate/adjust therapy  Thank you for allowing me to participate in the care of this patient  I will keep you apprised of developments      Sincerely,     Authenticated electronically by Lawrence Webber MD   on 81/92/83   Board Certified Specialist

## 2019-04-02 DIAGNOSIS — E78.2 MIXED HYPERLIPIDEMIA: ICD-10-CM

## 2019-04-02 RX ORDER — ATORVASTATIN CALCIUM 10 MG/1
10 TABLET, FILM COATED ORAL DAILY
Qty: 30 TABLET | Refills: 2 | Status: SHIPPED | OUTPATIENT
Start: 2019-04-02 | End: 2019-11-07 | Stop reason: SDUPTHER

## 2019-04-04 LAB
LEFT EYE DIABETIC RETINOPATHY: NORMAL
RIGHT EYE DIABETIC RETINOPATHY: NORMAL

## 2019-04-06 ENCOUNTER — APPOINTMENT (OUTPATIENT)
Dept: LAB | Facility: HOSPITAL | Age: 59
End: 2019-04-06
Payer: COMMERCIAL

## 2019-04-06 DIAGNOSIS — E11.9 TYPE 2 DIABETES MELLITUS WITHOUT COMPLICATION, WITHOUT LONG-TERM CURRENT USE OF INSULIN (HCC): ICD-10-CM

## 2019-04-06 LAB
ALBUMIN SERPL BCP-MCNC: 3.7 G/DL (ref 3.5–5)
ALP SERPL-CCNC: 87 U/L (ref 46–116)
ALT SERPL W P-5'-P-CCNC: 26 U/L (ref 12–78)
ANION GAP SERPL CALCULATED.3IONS-SCNC: 7 MMOL/L (ref 4–13)
AST SERPL W P-5'-P-CCNC: 22 U/L (ref 5–45)
BASOPHILS # BLD AUTO: 0.02 THOUSANDS/ΜL (ref 0–0.1)
BASOPHILS NFR BLD AUTO: 0 % (ref 0–1)
BILIRUB SERPL-MCNC: 0.6 MG/DL (ref 0.2–1)
BUN SERPL-MCNC: 16 MG/DL (ref 5–25)
CALCIUM SERPL-MCNC: 9.8 MG/DL (ref 8.3–10.1)
CHLORIDE SERPL-SCNC: 105 MMOL/L (ref 100–108)
CO2 SERPL-SCNC: 30 MMOL/L (ref 21–32)
CREAT SERPL-MCNC: 0.83 MG/DL (ref 0.6–1.3)
CREAT UR-MCNC: 193 MG/DL
EOSINOPHIL # BLD AUTO: 0.27 THOUSAND/ΜL (ref 0–0.61)
EOSINOPHIL NFR BLD AUTO: 4 % (ref 0–6)
ERYTHROCYTE [DISTWIDTH] IN BLOOD BY AUTOMATED COUNT: 12 % (ref 11.6–15.1)
EST. AVERAGE GLUCOSE BLD GHB EST-MCNC: 123 MG/DL
GFR SERPL CREATININE-BSD FRML MDRD: 77 ML/MIN/1.73SQ M
GLUCOSE P FAST SERPL-MCNC: 127 MG/DL (ref 65–99)
HBA1C MFR BLD: 5.9 % (ref 4.2–6.3)
HCT VFR BLD AUTO: 43.2 % (ref 34.8–46.1)
HGB BLD-MCNC: 14.5 G/DL (ref 11.5–15.4)
IMM GRANULOCYTES # BLD AUTO: 0.01 THOUSAND/UL (ref 0–0.2)
IMM GRANULOCYTES NFR BLD AUTO: 0 % (ref 0–2)
LYMPHOCYTES # BLD AUTO: 2.13 THOUSANDS/ΜL (ref 0.6–4.47)
LYMPHOCYTES NFR BLD AUTO: 31 % (ref 14–44)
MCH RBC QN AUTO: 29.7 PG (ref 26.8–34.3)
MCHC RBC AUTO-ENTMCNC: 33.6 G/DL (ref 31.4–37.4)
MCV RBC AUTO: 89 FL (ref 82–98)
MICROALBUMIN UR-MCNC: 9.1 MG/L (ref 0–20)
MICROALBUMIN/CREAT 24H UR: 5 MG/G CREATININE (ref 0–30)
MONOCYTES # BLD AUTO: 0.45 THOUSAND/ΜL (ref 0.17–1.22)
MONOCYTES NFR BLD AUTO: 7 % (ref 4–12)
NEUTROPHILS # BLD AUTO: 3.94 THOUSANDS/ΜL (ref 1.85–7.62)
NEUTS SEG NFR BLD AUTO: 58 % (ref 43–75)
NRBC BLD AUTO-RTO: 0 /100 WBCS
PLATELET # BLD AUTO: 250 THOUSANDS/UL (ref 149–390)
PMV BLD AUTO: 11.5 FL (ref 8.9–12.7)
POTASSIUM SERPL-SCNC: 4.1 MMOL/L (ref 3.5–5.3)
PROT SERPL-MCNC: 7.6 G/DL (ref 6.4–8.2)
RBC # BLD AUTO: 4.88 MILLION/UL (ref 3.81–5.12)
SODIUM SERPL-SCNC: 142 MMOL/L (ref 136–145)
TSH SERPL DL<=0.05 MIU/L-ACNC: 0.66 UIU/ML (ref 0.36–3.74)
WBC # BLD AUTO: 6.82 THOUSAND/UL (ref 4.31–10.16)

## 2019-04-06 PROCEDURE — 80053 COMPREHEN METABOLIC PANEL: CPT

## 2019-04-06 PROCEDURE — 84443 ASSAY THYROID STIM HORMONE: CPT

## 2019-04-06 PROCEDURE — 36415 COLL VENOUS BLD VENIPUNCTURE: CPT

## 2019-04-06 PROCEDURE — 82043 UR ALBUMIN QUANTITATIVE: CPT

## 2019-04-06 PROCEDURE — 85025 COMPLETE CBC W/AUTO DIFF WBC: CPT

## 2019-04-06 PROCEDURE — 83036 HEMOGLOBIN GLYCOSYLATED A1C: CPT

## 2019-04-06 PROCEDURE — 82570 ASSAY OF URINE CREATININE: CPT

## 2019-04-08 DIAGNOSIS — R73.01 IFG (IMPAIRED FASTING GLUCOSE): ICD-10-CM

## 2019-04-15 ENCOUNTER — OFFICE VISIT (OUTPATIENT)
Dept: FAMILY MEDICINE CLINIC | Facility: CLINIC | Age: 59
End: 2019-04-15
Payer: COMMERCIAL

## 2019-04-15 VITALS
HEART RATE: 69 BPM | WEIGHT: 168 LBS | BODY MASS INDEX: 28.68 KG/M2 | DIASTOLIC BLOOD PRESSURE: 60 MMHG | RESPIRATION RATE: 16 BRPM | OXYGEN SATURATION: 98 % | HEIGHT: 64 IN | TEMPERATURE: 97.3 F | SYSTOLIC BLOOD PRESSURE: 112 MMHG

## 2019-04-15 DIAGNOSIS — E55.9 VITAMIN D DEFICIENCY: ICD-10-CM

## 2019-04-15 DIAGNOSIS — E03.9 ACQUIRED HYPOTHYROIDISM: ICD-10-CM

## 2019-04-15 DIAGNOSIS — E11.9 TYPE 2 DIABETES MELLITUS WITHOUT COMPLICATION, WITHOUT LONG-TERM CURRENT USE OF INSULIN (HCC): Primary | ICD-10-CM

## 2019-04-15 DIAGNOSIS — I10 ESSENTIAL HYPERTENSION: ICD-10-CM

## 2019-04-15 DIAGNOSIS — E78.2 MIXED HYPERLIPIDEMIA: ICD-10-CM

## 2019-04-15 DIAGNOSIS — J45.909 ASTHMA, UNSPECIFIED ASTHMA SEVERITY, UNSPECIFIED WHETHER COMPLICATED, UNSPECIFIED WHETHER PERSISTENT: ICD-10-CM

## 2019-04-15 DIAGNOSIS — R73.01 IFG (IMPAIRED FASTING GLUCOSE): ICD-10-CM

## 2019-04-15 PROCEDURE — 99214 OFFICE O/P EST MOD 30 MIN: CPT | Performed by: FAMILY MEDICINE

## 2019-04-15 RX ORDER — ALBUTEROL SULFATE 90 UG/1
2 AEROSOL, METERED RESPIRATORY (INHALATION) EVERY 6 HOURS PRN
Qty: 1 INHALER | Refills: 1 | Status: SHIPPED | OUTPATIENT
Start: 2019-04-15 | End: 2019-08-21 | Stop reason: ALTCHOICE

## 2019-04-19 ENCOUNTER — OFFICE VISIT (OUTPATIENT)
Dept: SURGERY | Facility: CLINIC | Age: 59
End: 2019-04-19
Payer: COMMERCIAL

## 2019-04-19 VITALS
TEMPERATURE: 97.8 F | SYSTOLIC BLOOD PRESSURE: 108 MMHG | BODY MASS INDEX: 28.68 KG/M2 | HEIGHT: 64 IN | DIASTOLIC BLOOD PRESSURE: 70 MMHG | HEART RATE: 70 BPM | WEIGHT: 168 LBS | RESPIRATION RATE: 16 BRPM

## 2019-04-19 DIAGNOSIS — E11.9 TYPE 2 DIABETES MELLITUS WITHOUT COMPLICATION, WITHOUT LONG-TERM CURRENT USE OF INSULIN (HCC): ICD-10-CM

## 2019-04-19 DIAGNOSIS — K43.9 VENTRAL HERNIA WITHOUT OBSTRUCTION OR GANGRENE: ICD-10-CM

## 2019-04-19 DIAGNOSIS — K43.2 INCISIONAL HERNIA, WITHOUT OBSTRUCTION OR GANGRENE: Primary | ICD-10-CM

## 2019-04-19 PROCEDURE — 99213 OFFICE O/P EST LOW 20 MIN: CPT | Performed by: SURGERY

## 2019-04-23 DIAGNOSIS — E11.9 TYPE 2 DIABETES MELLITUS WITHOUT COMPLICATION, WITHOUT LONG-TERM CURRENT USE OF INSULIN (HCC): Primary | ICD-10-CM

## 2019-05-20 ENCOUNTER — OFFICE VISIT (OUTPATIENT)
Dept: SURGERY | Facility: CLINIC | Age: 59
End: 2019-05-20

## 2019-05-20 VITALS
SYSTOLIC BLOOD PRESSURE: 118 MMHG | RESPIRATION RATE: 16 BRPM | WEIGHT: 171 LBS | DIASTOLIC BLOOD PRESSURE: 78 MMHG | BODY MASS INDEX: 29.19 KG/M2 | TEMPERATURE: 97.8 F | HEIGHT: 64 IN | HEART RATE: 79 BPM

## 2019-05-20 DIAGNOSIS — G47.09 OTHER INSOMNIA: ICD-10-CM

## 2019-05-20 DIAGNOSIS — K43.2 INCISIONAL HERNIA, WITHOUT OBSTRUCTION OR GANGRENE: Primary | ICD-10-CM

## 2019-05-20 PROCEDURE — PREOP: Performed by: PHYSICIAN ASSISTANT

## 2019-05-20 RX ORDER — NICOTINE POLACRILEX 4 MG/1
GUM, CHEWING ORAL
COMMUNITY
Start: 2017-07-06 | End: 2019-05-21

## 2019-05-20 RX ORDER — TRAZODONE HYDROCHLORIDE 50 MG/1
50 TABLET ORAL
Qty: 30 TABLET | Refills: 1 | OUTPATIENT
Start: 2019-05-20

## 2019-05-28 ENCOUNTER — ANESTHESIA EVENT (OUTPATIENT)
Dept: PERIOP | Facility: HOSPITAL | Age: 59
End: 2019-05-28
Payer: COMMERCIAL

## 2019-05-29 ENCOUNTER — ANESTHESIA (OUTPATIENT)
Dept: PERIOP | Facility: HOSPITAL | Age: 59
End: 2019-05-29
Payer: COMMERCIAL

## 2019-05-29 ENCOUNTER — HOSPITAL ENCOUNTER (OUTPATIENT)
Facility: HOSPITAL | Age: 59
Setting detail: OUTPATIENT SURGERY
Discharge: HOME | End: 2019-05-30
Attending: SURGERY | Admitting: SURGERY
Payer: COMMERCIAL

## 2019-05-29 DIAGNOSIS — K43.2 INCISIONAL HERNIA, WITHOUT OBSTRUCTION OR GANGRENE: Primary | ICD-10-CM

## 2019-05-29 LAB
GLUCOSE SERPL-MCNC: 104 MG/DL (ref 65–140)
GLUCOSE SERPL-MCNC: 143 MG/DL (ref 65–140)
GLUCOSE SERPL-MCNC: 146 MG/DL (ref 65–140)

## 2019-05-29 PROCEDURE — 49654 PR LAP, INCISIONAL HERNIA REPAIR,REDUCIBLE: CPT | Performed by: SURGERY

## 2019-05-29 PROCEDURE — 82948 REAGENT STRIP/BLOOD GLUCOSE: CPT

## 2019-05-29 PROCEDURE — 49654 PR LAP, INCISIONAL HERNIA REPAIR,REDUCIBLE: CPT | Performed by: PHYSICIAN ASSISTANT

## 2019-05-29 PROCEDURE — C1781 MESH (IMPLANTABLE): HCPCS | Performed by: SURGERY

## 2019-05-29 DEVICE — VENTRALIGHT ST MESH WITH ECHO PS POSITONING SYSTEM
Type: IMPLANTABLE DEVICE | Site: ABDOMEN | Status: FUNCTIONAL
Brand: VENTRALIGHT ST MESH WITH ECHO PS POSITONING SYSTEM

## 2019-05-29 RX ORDER — ONDANSETRON 4 MG/1
4 TABLET, ORALLY DISINTEGRATING ORAL EVERY 8 HOURS PRN
Status: DISCONTINUED | OUTPATIENT
Start: 2019-05-29 | End: 2019-05-30 | Stop reason: HOSPADM

## 2019-05-29 RX ORDER — ACETAMINOPHEN 325 MG/1
650 TABLET ORAL EVERY 6 HOURS PRN
Status: DISCONTINUED | OUTPATIENT
Start: 2019-05-29 | End: 2019-05-30 | Stop reason: HOSPADM

## 2019-05-29 RX ORDER — HYDROMORPHONE HCL/PF 1 MG/ML
0.5 SYRINGE (ML) INJECTION
Status: DISCONTINUED | OUTPATIENT
Start: 2019-05-29 | End: 2019-05-29 | Stop reason: HOSPADM

## 2019-05-29 RX ORDER — ONDANSETRON 2 MG/ML
INJECTION INTRAMUSCULAR; INTRAVENOUS AS NEEDED
Status: DISCONTINUED | OUTPATIENT
Start: 2019-05-29 | End: 2019-05-29 | Stop reason: SURG

## 2019-05-29 RX ORDER — FENTANYL CITRATE 50 UG/ML
25 INJECTION, SOLUTION INTRAMUSCULAR; INTRAVENOUS
Status: COMPLETED | OUTPATIENT
Start: 2019-05-29 | End: 2019-05-29

## 2019-05-29 RX ORDER — DEXAMETHASONE SODIUM PHOSPHATE 4 MG/ML
INJECTION, SOLUTION INTRA-ARTICULAR; INTRALESIONAL; INTRAMUSCULAR; INTRAVENOUS; SOFT TISSUE AS NEEDED
Status: DISCONTINUED | OUTPATIENT
Start: 2019-05-29 | End: 2019-05-29 | Stop reason: SURG

## 2019-05-29 RX ORDER — CEFAZOLIN SODIUM 2 G/50ML
SOLUTION INTRAVENOUS AS NEEDED
Status: DISCONTINUED | OUTPATIENT
Start: 2019-05-29 | End: 2019-05-29 | Stop reason: SURG

## 2019-05-29 RX ORDER — ONDANSETRON 4 MG/1
4 TABLET, FILM COATED ORAL EVERY 8 HOURS PRN
Qty: 20 TABLET | Refills: 0 | Status: SHIPPED | OUTPATIENT
Start: 2019-05-29 | End: 2019-07-22 | Stop reason: ALTCHOICE

## 2019-05-29 RX ORDER — ONDANSETRON 2 MG/ML
4 INJECTION INTRAMUSCULAR; INTRAVENOUS EVERY 4 HOURS PRN
Status: DISCONTINUED | OUTPATIENT
Start: 2019-05-29 | End: 2019-05-30

## 2019-05-29 RX ORDER — HYDROMORPHONE HCL/PF 1 MG/ML
1 SYRINGE (ML) INJECTION
Status: DISCONTINUED | OUTPATIENT
Start: 2019-05-29 | End: 2019-05-30 | Stop reason: HOSPADM

## 2019-05-29 RX ORDER — ONDANSETRON 2 MG/ML
4 INJECTION INTRAMUSCULAR; INTRAVENOUS EVERY 6 HOURS PRN
Status: DISCONTINUED | OUTPATIENT
Start: 2019-05-29 | End: 2019-05-29 | Stop reason: HOSPADM

## 2019-05-29 RX ORDER — GLYCOPYRROLATE 0.2 MG/ML
INJECTION INTRAMUSCULAR; INTRAVENOUS AS NEEDED
Status: DISCONTINUED | OUTPATIENT
Start: 2019-05-29 | End: 2019-05-29 | Stop reason: SURG

## 2019-05-29 RX ORDER — KETOROLAC TROMETHAMINE 30 MG/ML
INJECTION, SOLUTION INTRAMUSCULAR; INTRAVENOUS AS NEEDED
Status: DISCONTINUED | OUTPATIENT
Start: 2019-05-29 | End: 2019-05-29 | Stop reason: SURG

## 2019-05-29 RX ORDER — HEPARIN SODIUM 5000 [USP'U]/ML
5000 INJECTION, SOLUTION INTRAVENOUS; SUBCUTANEOUS EVERY 8 HOURS SCHEDULED
Status: DISCONTINUED | OUTPATIENT
Start: 2019-05-30 | End: 2019-05-30 | Stop reason: HOSPADM

## 2019-05-29 RX ORDER — ROCURONIUM BROMIDE 10 MG/ML
INJECTION, SOLUTION INTRAVENOUS AS NEEDED
Status: DISCONTINUED | OUTPATIENT
Start: 2019-05-29 | End: 2019-05-29 | Stop reason: SURG

## 2019-05-29 RX ORDER — FENTANYL CITRATE 50 UG/ML
INJECTION, SOLUTION INTRAMUSCULAR; INTRAVENOUS AS NEEDED
Status: DISCONTINUED | OUTPATIENT
Start: 2019-05-29 | End: 2019-05-29 | Stop reason: SURG

## 2019-05-29 RX ORDER — DEXTROSE AND SODIUM CHLORIDE 5; .45 G/100ML; G/100ML
80 INJECTION, SOLUTION INTRAVENOUS CONTINUOUS
Status: DISCONTINUED | OUTPATIENT
Start: 2019-05-29 | End: 2019-05-30 | Stop reason: HOSPADM

## 2019-05-29 RX ORDER — ALBUTEROL SULFATE 90 UG/1
2 AEROSOL, METERED RESPIRATORY (INHALATION) EVERY 6 HOURS PRN
Status: DISCONTINUED | OUTPATIENT
Start: 2019-05-29 | End: 2019-05-30 | Stop reason: HOSPADM

## 2019-05-29 RX ORDER — ATORVASTATIN CALCIUM 10 MG/1
10 TABLET, FILM COATED ORAL
Status: DISCONTINUED | OUTPATIENT
Start: 2019-05-30 | End: 2019-05-30 | Stop reason: HOSPADM

## 2019-05-29 RX ORDER — ONDANSETRON 2 MG/ML
4 INJECTION INTRAMUSCULAR; INTRAVENOUS EVERY 8 HOURS PRN
Status: DISCONTINUED | OUTPATIENT
Start: 2019-05-29 | End: 2019-05-30 | Stop reason: HOSPADM

## 2019-05-29 RX ORDER — CEFAZOLIN SODIUM 1 G/50ML
1000 SOLUTION INTRAVENOUS ONCE
Status: DISCONTINUED | OUTPATIENT
Start: 2019-05-29 | End: 2019-05-29 | Stop reason: HOSPADM

## 2019-05-29 RX ORDER — PANTOPRAZOLE SODIUM 40 MG/1
40 INJECTION, POWDER, FOR SOLUTION INTRAVENOUS DAILY
Status: DISCONTINUED | OUTPATIENT
Start: 2019-05-30 | End: 2019-05-30 | Stop reason: HOSPADM

## 2019-05-29 RX ORDER — HYDROCODONE BITARTRATE AND ACETAMINOPHEN 5; 325 MG/1; MG/1
1 TABLET ORAL EVERY 4 HOURS PRN
Status: DISCONTINUED | OUTPATIENT
Start: 2019-05-29 | End: 2019-05-30 | Stop reason: HOSPADM

## 2019-05-29 RX ORDER — MIDAZOLAM HYDROCHLORIDE 1 MG/ML
INJECTION INTRAMUSCULAR; INTRAVENOUS AS NEEDED
Status: DISCONTINUED | OUTPATIENT
Start: 2019-05-29 | End: 2019-05-29 | Stop reason: SURG

## 2019-05-29 RX ORDER — HYDROMORPHONE HYDROCHLORIDE 2 MG/ML
INJECTION, SOLUTION INTRAMUSCULAR; INTRAVENOUS; SUBCUTANEOUS AS NEEDED
Status: DISCONTINUED | OUTPATIENT
Start: 2019-05-29 | End: 2019-05-29 | Stop reason: SURG

## 2019-05-29 RX ORDER — DOCUSATE SODIUM 100 MG/1
100 CAPSULE, LIQUID FILLED ORAL 2 TIMES DAILY
Qty: 60 CAPSULE | Refills: 0 | Status: SHIPPED | OUTPATIENT
Start: 2019-05-29 | End: 2019-07-22 | Stop reason: ALTCHOICE

## 2019-05-29 RX ORDER — SODIUM CHLORIDE 9 MG/ML
INJECTION, SOLUTION INTRAVENOUS CONTINUOUS PRN
Status: DISCONTINUED | OUTPATIENT
Start: 2019-05-29 | End: 2019-05-29 | Stop reason: SURG

## 2019-05-29 RX ORDER — HYDROCODONE BITARTRATE AND ACETAMINOPHEN 5; 325 MG/1; MG/1
1 TABLET ORAL EVERY 4 HOURS PRN
Qty: 10 TABLET | Refills: 0 | Status: SHIPPED | OUTPATIENT
Start: 2019-05-29 | End: 2019-07-22 | Stop reason: ALTCHOICE

## 2019-05-29 RX ORDER — SODIUM CHLORIDE 9 MG/ML
125 INJECTION, SOLUTION INTRAVENOUS CONTINUOUS
Status: DISCONTINUED | OUTPATIENT
Start: 2019-05-29 | End: 2019-05-30

## 2019-05-29 RX ORDER — PROPOFOL 10 MG/ML
INJECTION, EMULSION INTRAVENOUS AS NEEDED
Status: DISCONTINUED | OUTPATIENT
Start: 2019-05-29 | End: 2019-05-29 | Stop reason: SURG

## 2019-05-29 RX ORDER — NEOSTIGMINE METHYLSULFATE 1 MG/ML
INJECTION INTRAVENOUS AS NEEDED
Status: DISCONTINUED | OUTPATIENT
Start: 2019-05-29 | End: 2019-05-29 | Stop reason: SURG

## 2019-05-29 RX ADMIN — FENTANYL CITRATE 50 MCG: 50 INJECTION, SOLUTION INTRAMUSCULAR; INTRAVENOUS at 15:47

## 2019-05-29 RX ADMIN — ROCURONIUM BROMIDE 40 MG: 10 INJECTION, SOLUTION INTRAVENOUS at 15:22

## 2019-05-29 RX ADMIN — SODIUM CHLORIDE: 0.9 INJECTION, SOLUTION INTRAVENOUS at 16:06

## 2019-05-29 RX ADMIN — HYDROMORPHONE HYDROCHLORIDE 1 MG: 1 INJECTION, SOLUTION INTRAMUSCULAR; INTRAVENOUS; SUBCUTANEOUS at 20:09

## 2019-05-29 RX ADMIN — HYDROMORPHONE HYDROCHLORIDE 0.5 MG: 2 INJECTION, SOLUTION INTRAMUSCULAR; INTRAVENOUS; SUBCUTANEOUS at 17:54

## 2019-05-29 RX ADMIN — ONDANSETRON HYDROCHLORIDE 4 MG: 2 INJECTION, SOLUTION INTRAVENOUS at 17:37

## 2019-05-29 RX ADMIN — SODIUM CHLORIDE: 0.9 INJECTION, SOLUTION INTRAVENOUS at 15:11

## 2019-05-29 RX ADMIN — CEFAZOLIN SODIUM 2000 MG: 2 SOLUTION INTRAVENOUS at 15:26

## 2019-05-29 RX ADMIN — NEOSTIGMINE METHYLSULFATE 2.5 MG: 1 INJECTION, SOLUTION INTRAVENOUS at 17:54

## 2019-05-29 RX ADMIN — MIDAZOLAM 2 MG: 1 INJECTION INTRAMUSCULAR; INTRAVENOUS at 15:19

## 2019-05-29 RX ADMIN — FENTANYL CITRATE 100 MCG: 50 INJECTION, SOLUTION INTRAMUSCULAR; INTRAVENOUS at 15:22

## 2019-05-29 RX ADMIN — FENTANYL CITRATE 25 MCG: 50 INJECTION, SOLUTION INTRAMUSCULAR; INTRAVENOUS at 18:47

## 2019-05-29 RX ADMIN — HYDROMORPHONE HYDROCHLORIDE 0.5 MG: 1 INJECTION, SOLUTION INTRAMUSCULAR; INTRAVENOUS; SUBCUTANEOUS at 19:23

## 2019-05-29 RX ADMIN — HYDROMORPHONE HYDROCHLORIDE 0.5 MG: 2 INJECTION, SOLUTION INTRAMUSCULAR; INTRAVENOUS; SUBCUTANEOUS at 16:49

## 2019-05-29 RX ADMIN — SODIUM CHLORIDE 125 ML/HR: 0.9 INJECTION, SOLUTION INTRAVENOUS at 13:54

## 2019-05-29 RX ADMIN — HYDROMORPHONE HYDROCHLORIDE 1 MG: 1 INJECTION, SOLUTION INTRAMUSCULAR; INTRAVENOUS; SUBCUTANEOUS at 23:24

## 2019-05-29 RX ADMIN — FENTANYL CITRATE 25 MCG: 50 INJECTION, SOLUTION INTRAMUSCULAR; INTRAVENOUS at 18:27

## 2019-05-29 RX ADMIN — SODIUM CHLORIDE: 0.9 INJECTION, SOLUTION INTRAVENOUS at 17:42

## 2019-05-29 RX ADMIN — KETOROLAC TROMETHAMINE 15 MG: 30 INJECTION, SOLUTION INTRAMUSCULAR at 17:47

## 2019-05-29 RX ADMIN — PROPOFOL 200 MG: 10 INJECTION, EMULSION INTRAVENOUS at 15:22

## 2019-05-29 RX ADMIN — LIDOCAINE HYDROCHLORIDE 60 MG: 20 INJECTION, SOLUTION INTRAVENOUS at 15:22

## 2019-05-29 RX ADMIN — GLYCOPYRROLATE 0.4 MG: 0.2 INJECTION INTRAMUSCULAR; INTRAVENOUS at 17:54

## 2019-05-29 RX ADMIN — ROCURONIUM BROMIDE 10 MG: 10 INJECTION, SOLUTION INTRAVENOUS at 16:05

## 2019-05-29 RX ADMIN — FENTANYL CITRATE 25 MCG: 50 INJECTION, SOLUTION INTRAMUSCULAR; INTRAVENOUS at 18:37

## 2019-05-29 RX ADMIN — DEXTROSE AND SODIUM CHLORIDE 80 ML/HR: 5; .45 INJECTION, SOLUTION INTRAVENOUS at 23:27

## 2019-05-29 RX ADMIN — DEXAMETHASONE SODIUM PHOSPHATE 4 MG: 4 INJECTION, SOLUTION INTRAMUSCULAR; INTRAVENOUS at 15:26

## 2019-05-29 RX ADMIN — FENTANYL CITRATE 25 MCG: 50 INJECTION, SOLUTION INTRAMUSCULAR; INTRAVENOUS at 19:10

## 2019-05-30 ENCOUNTER — TRANSITIONAL CARE MANAGEMENT (OUTPATIENT)
Dept: FAMILY MEDICINE CLINIC | Facility: CLINIC | Age: 59
End: 2019-05-30

## 2019-05-30 ENCOUNTER — TELEPHONE (OUTPATIENT)
Dept: SURGERY | Facility: CLINIC | Age: 59
End: 2019-05-30

## 2019-05-30 VITALS
TEMPERATURE: 97.5 F | BODY MASS INDEX: 29.34 KG/M2 | DIASTOLIC BLOOD PRESSURE: 75 MMHG | RESPIRATION RATE: 18 BRPM | SYSTOLIC BLOOD PRESSURE: 130 MMHG | OXYGEN SATURATION: 95 % | WEIGHT: 182.54 LBS | HEART RATE: 69 BPM | HEIGHT: 66 IN

## 2019-05-30 LAB
ALBUMIN SERPL BCP-MCNC: 3.4 G/DL (ref 3.5–5)
ALP SERPL-CCNC: 98 U/L (ref 46–116)
ALT SERPL W P-5'-P-CCNC: 105 U/L (ref 12–78)
ANION GAP SERPL CALCULATED.3IONS-SCNC: 9 MMOL/L (ref 4–13)
AST SERPL W P-5'-P-CCNC: 111 U/L (ref 5–45)
BASOPHILS # BLD AUTO: 0.02 THOUSANDS/ΜL (ref 0–0.1)
BASOPHILS NFR BLD AUTO: 0 % (ref 0–1)
BILIRUB SERPL-MCNC: 0.61 MG/DL (ref 0.2–1)
BUN SERPL-MCNC: 16 MG/DL (ref 5–25)
CALCIUM SERPL-MCNC: 9.1 MG/DL (ref 8.3–10.1)
CHLORIDE SERPL-SCNC: 106 MMOL/L (ref 100–108)
CO2 SERPL-SCNC: 25 MMOL/L (ref 21–32)
CREAT SERPL-MCNC: 0.65 MG/DL (ref 0.6–1.3)
EOSINOPHIL # BLD AUTO: 0 THOUSAND/ΜL (ref 0–0.61)
EOSINOPHIL NFR BLD AUTO: 0 % (ref 0–6)
ERYTHROCYTE [DISTWIDTH] IN BLOOD BY AUTOMATED COUNT: 12.3 % (ref 11.6–15.1)
GFR SERPL CREATININE-BSD FRML MDRD: 97 ML/MIN/1.73SQ M
GLUCOSE SERPL-MCNC: 106 MG/DL (ref 65–140)
GLUCOSE SERPL-MCNC: 154 MG/DL (ref 65–140)
HCT VFR BLD AUTO: 43.6 % (ref 34.8–46.1)
HGB BLD-MCNC: 14.3 G/DL (ref 11.5–15.4)
IMM GRANULOCYTES # BLD AUTO: 0.02 THOUSAND/UL (ref 0–0.2)
IMM GRANULOCYTES NFR BLD AUTO: 0 % (ref 0–2)
LYMPHOCYTES # BLD AUTO: 1.68 THOUSANDS/ΜL (ref 0.6–4.47)
LYMPHOCYTES NFR BLD AUTO: 15 % (ref 14–44)
MCH RBC QN AUTO: 29.8 PG (ref 26.8–34.3)
MCHC RBC AUTO-ENTMCNC: 32.8 G/DL (ref 31.4–37.4)
MCV RBC AUTO: 91 FL (ref 82–98)
MONOCYTES # BLD AUTO: 0.71 THOUSAND/ΜL (ref 0.17–1.22)
MONOCYTES NFR BLD AUTO: 6 % (ref 4–12)
NEUTROPHILS # BLD AUTO: 8.58 THOUSANDS/ΜL (ref 1.85–7.62)
NEUTS SEG NFR BLD AUTO: 79 % (ref 43–75)
NRBC BLD AUTO-RTO: 0 /100 WBCS
PLATELET # BLD AUTO: 254 THOUSANDS/UL (ref 149–390)
PMV BLD AUTO: 10.8 FL (ref 8.9–12.7)
POTASSIUM SERPL-SCNC: 4.9 MMOL/L (ref 3.5–5.3)
PROT SERPL-MCNC: 7.3 G/DL (ref 6.4–8.2)
RBC # BLD AUTO: 4.8 MILLION/UL (ref 3.81–5.12)
SODIUM SERPL-SCNC: 140 MMOL/L (ref 136–145)
WBC # BLD AUTO: 11.01 THOUSAND/UL (ref 4.31–10.16)

## 2019-05-30 PROCEDURE — NC001 PR NO CHARGE: Performed by: SURGERY

## 2019-05-30 PROCEDURE — 99024 POSTOP FOLLOW-UP VISIT: CPT | Performed by: SURGERY

## 2019-05-30 PROCEDURE — 82948 REAGENT STRIP/BLOOD GLUCOSE: CPT

## 2019-05-30 PROCEDURE — C9113 INJ PANTOPRAZOLE SODIUM, VIA: HCPCS | Performed by: PHYSICIAN ASSISTANT

## 2019-05-30 PROCEDURE — 85025 COMPLETE CBC W/AUTO DIFF WBC: CPT | Performed by: PHYSICIAN ASSISTANT

## 2019-05-30 PROCEDURE — 80053 COMPREHEN METABOLIC PANEL: CPT | Performed by: PHYSICIAN ASSISTANT

## 2019-05-30 RX ORDER — DOCUSATE SODIUM 100 MG/1
100 CAPSULE, LIQUID FILLED ORAL 2 TIMES DAILY
Status: DISCONTINUED | OUTPATIENT
Start: 2019-05-30 | End: 2019-05-30 | Stop reason: HOSPADM

## 2019-05-30 RX ORDER — LEVOTHYROXINE SODIUM 0.12 MG/1
125 TABLET ORAL
Status: DISCONTINUED | OUTPATIENT
Start: 2019-05-30 | End: 2019-05-30 | Stop reason: HOSPADM

## 2019-05-30 RX ADMIN — PANTOPRAZOLE SODIUM 40 MG: 40 INJECTION, POWDER, FOR SOLUTION INTRAVENOUS at 08:05

## 2019-05-30 RX ADMIN — HYDROMORPHONE HYDROCHLORIDE 1 MG: 1 INJECTION, SOLUTION INTRAMUSCULAR; INTRAVENOUS; SUBCUTANEOUS at 06:39

## 2019-05-30 RX ADMIN — HEPARIN SODIUM 5000 UNITS: 5000 INJECTION INTRAVENOUS; SUBCUTANEOUS at 10:32

## 2019-05-30 RX ADMIN — METFORMIN HYDROCHLORIDE 500 MG: 500 TABLET ORAL at 08:05

## 2019-05-30 RX ADMIN — HYDROMORPHONE HYDROCHLORIDE 1 MG: 1 INJECTION, SOLUTION INTRAMUSCULAR; INTRAVENOUS; SUBCUTANEOUS at 06:37

## 2019-05-30 RX ADMIN — LEVOTHYROXINE SODIUM 125 MCG: 125 TABLET ORAL at 05:51

## 2019-05-30 RX ADMIN — HYDROCODONE BITARTRATE AND ACETAMINOPHEN 1 TABLET: 5; 325 TABLET ORAL at 10:35

## 2019-05-30 RX ADMIN — DOCUSATE SODIUM 100 MG: 100 CAPSULE, LIQUID FILLED ORAL at 10:30

## 2019-06-10 ENCOUNTER — OFFICE VISIT (OUTPATIENT)
Dept: SURGERY | Facility: CLINIC | Age: 59
End: 2019-06-10

## 2019-06-10 DIAGNOSIS — K43.9 HERNIA OF ABDOMINAL WALL: Primary | ICD-10-CM

## 2019-06-10 PROCEDURE — 99024 POSTOP FOLLOW-UP VISIT: CPT | Performed by: SURGERY

## 2019-06-10 RX ORDER — TRAMADOL HYDROCHLORIDE 50 MG/1
50 TABLET ORAL EVERY 6 HOURS PRN
Qty: 30 TABLET | Refills: 0 | Status: SHIPPED | OUTPATIENT
Start: 2019-06-10 | End: 2019-06-20

## 2019-06-24 ENCOUNTER — OFFICE VISIT (OUTPATIENT)
Dept: SURGERY | Facility: CLINIC | Age: 59
End: 2019-06-24

## 2019-06-24 VITALS
DIASTOLIC BLOOD PRESSURE: 66 MMHG | SYSTOLIC BLOOD PRESSURE: 110 MMHG | WEIGHT: 166.6 LBS | BODY MASS INDEX: 26.89 KG/M2 | TEMPERATURE: 98.9 F

## 2019-06-24 DIAGNOSIS — K43.9 VENTRAL HERNIA WITHOUT OBSTRUCTION OR GANGRENE: Primary | ICD-10-CM

## 2019-06-24 PROCEDURE — 99024 POSTOP FOLLOW-UP VISIT: CPT | Performed by: PHYSICIAN ASSISTANT

## 2019-07-16 ENCOUNTER — APPOINTMENT (OUTPATIENT)
Dept: LAB | Facility: HOSPITAL | Age: 59
End: 2019-07-16
Payer: COMMERCIAL

## 2019-07-16 DIAGNOSIS — R73.01 IFG (IMPAIRED FASTING GLUCOSE): ICD-10-CM

## 2019-07-16 DIAGNOSIS — E55.9 VITAMIN D DEFICIENCY: ICD-10-CM

## 2019-07-16 DIAGNOSIS — E11.9 TYPE 2 DIABETES MELLITUS WITHOUT COMPLICATION, WITHOUT LONG-TERM CURRENT USE OF INSULIN (HCC): ICD-10-CM

## 2019-07-16 DIAGNOSIS — E78.2 MIXED HYPERLIPIDEMIA: ICD-10-CM

## 2019-07-16 DIAGNOSIS — E03.9 ACQUIRED HYPOTHYROIDISM: ICD-10-CM

## 2019-07-16 DIAGNOSIS — J45.909 ASTHMA, UNSPECIFIED ASTHMA SEVERITY, UNSPECIFIED WHETHER COMPLICATED, UNSPECIFIED WHETHER PERSISTENT: ICD-10-CM

## 2019-07-16 DIAGNOSIS — I10 ESSENTIAL HYPERTENSION: ICD-10-CM

## 2019-07-16 LAB
ANION GAP SERPL CALCULATED.3IONS-SCNC: 6 MMOL/L (ref 4–13)
BASOPHILS # BLD AUTO: 0.02 THOUSANDS/ΜL (ref 0–0.1)
BASOPHILS NFR BLD AUTO: 0 % (ref 0–1)
BUN SERPL-MCNC: 13 MG/DL (ref 5–25)
CALCIUM SERPL-MCNC: 9.7 MG/DL (ref 8.3–10.1)
CHLORIDE SERPL-SCNC: 107 MMOL/L (ref 100–108)
CHOLEST SERPL-MCNC: 159 MG/DL (ref 50–200)
CO2 SERPL-SCNC: 29 MMOL/L (ref 21–32)
CREAT SERPL-MCNC: 0.78 MG/DL (ref 0.6–1.3)
EOSINOPHIL # BLD AUTO: 0.19 THOUSAND/ΜL (ref 0–0.61)
EOSINOPHIL NFR BLD AUTO: 4 % (ref 0–6)
ERYTHROCYTE [DISTWIDTH] IN BLOOD BY AUTOMATED COUNT: 12 % (ref 11.6–15.1)
EST. AVERAGE GLUCOSE BLD GHB EST-MCNC: 131 MG/DL
GFR SERPL CREATININE-BSD FRML MDRD: 83 ML/MIN/1.73SQ M
GLUCOSE P FAST SERPL-MCNC: 124 MG/DL (ref 65–99)
HBA1C MFR BLD: 6.2 % (ref 4.2–6.3)
HCT VFR BLD AUTO: 43.5 % (ref 34.8–46.1)
HDLC SERPL-MCNC: 49 MG/DL (ref 40–60)
HGB BLD-MCNC: 14.2 G/DL (ref 11.5–15.4)
IMM GRANULOCYTES # BLD AUTO: 0.01 THOUSAND/UL (ref 0–0.2)
IMM GRANULOCYTES NFR BLD AUTO: 0 % (ref 0–2)
LDLC SERPL CALC-MCNC: 89 MG/DL (ref 0–100)
LYMPHOCYTES # BLD AUTO: 2.18 THOUSANDS/ΜL (ref 0.6–4.47)
LYMPHOCYTES NFR BLD AUTO: 40 % (ref 14–44)
MCH RBC QN AUTO: 29.3 PG (ref 26.8–34.3)
MCHC RBC AUTO-ENTMCNC: 32.6 G/DL (ref 31.4–37.4)
MCV RBC AUTO: 90 FL (ref 82–98)
MONOCYTES # BLD AUTO: 0.39 THOUSAND/ΜL (ref 0.17–1.22)
MONOCYTES NFR BLD AUTO: 7 % (ref 4–12)
NEUTROPHILS # BLD AUTO: 2.63 THOUSANDS/ΜL (ref 1.85–7.62)
NEUTS SEG NFR BLD AUTO: 49 % (ref 43–75)
NRBC BLD AUTO-RTO: 0 /100 WBCS
PLATELET # BLD AUTO: 219 THOUSANDS/UL (ref 149–390)
PMV BLD AUTO: 11.6 FL (ref 8.9–12.7)
POTASSIUM SERPL-SCNC: 4.3 MMOL/L (ref 3.5–5.3)
RBC # BLD AUTO: 4.84 MILLION/UL (ref 3.81–5.12)
SODIUM SERPL-SCNC: 142 MMOL/L (ref 136–145)
TRIGL SERPL-MCNC: 105 MG/DL
TSH SERPL DL<=0.05 MIU/L-ACNC: 0.41 UIU/ML (ref 0.36–3.74)
WBC # BLD AUTO: 5.42 THOUSAND/UL (ref 4.31–10.16)

## 2019-07-16 PROCEDURE — 84443 ASSAY THYROID STIM HORMONE: CPT

## 2019-07-16 PROCEDURE — 80048 BASIC METABOLIC PNL TOTAL CA: CPT

## 2019-07-16 PROCEDURE — 80061 LIPID PANEL: CPT

## 2019-07-16 PROCEDURE — 85025 COMPLETE CBC W/AUTO DIFF WBC: CPT

## 2019-07-16 PROCEDURE — 36415 COLL VENOUS BLD VENIPUNCTURE: CPT

## 2019-07-16 PROCEDURE — 83036 HEMOGLOBIN GLYCOSYLATED A1C: CPT

## 2019-07-22 ENCOUNTER — OFFICE VISIT (OUTPATIENT)
Dept: FAMILY MEDICINE CLINIC | Facility: CLINIC | Age: 59
End: 2019-07-22
Payer: COMMERCIAL

## 2019-07-22 VITALS
HEART RATE: 77 BPM | OXYGEN SATURATION: 98 % | WEIGHT: 160 LBS | HEIGHT: 64 IN | SYSTOLIC BLOOD PRESSURE: 124 MMHG | DIASTOLIC BLOOD PRESSURE: 60 MMHG | TEMPERATURE: 98.1 F | BODY MASS INDEX: 27.31 KG/M2

## 2019-07-22 DIAGNOSIS — Z12.31 SCREENING MAMMOGRAM, ENCOUNTER FOR: ICD-10-CM

## 2019-07-22 DIAGNOSIS — E03.9 ACQUIRED HYPOTHYROIDISM: ICD-10-CM

## 2019-07-22 DIAGNOSIS — R10.12 LEFT UPPER QUADRANT PAIN: Primary | ICD-10-CM

## 2019-07-22 PROCEDURE — 99214 OFFICE O/P EST MOD 30 MIN: CPT | Performed by: FAMILY MEDICINE

## 2019-07-22 RX ORDER — CIPROFLOXACIN 500 MG/1
500 TABLET, FILM COATED ORAL 2 TIMES DAILY
Refills: 0 | COMMUNITY
End: 2019-08-21 | Stop reason: ALTCHOICE

## 2019-07-22 RX ORDER — METRONIDAZOLE 250 MG/1
250 TABLET ORAL 3 TIMES DAILY
Refills: 0 | COMMUNITY
Start: 2019-07-17 | End: 2019-08-21 | Stop reason: ALTCHOICE

## 2019-07-22 RX ORDER — BLOOD SUGAR DIAGNOSTIC
STRIP MISCELLANEOUS 2 TIMES DAILY
Refills: 2 | COMMUNITY
Start: 2019-07-18 | End: 2019-12-11 | Stop reason: SDUPTHER

## 2019-07-22 RX ORDER — TRAMADOL HYDROCHLORIDE 50 MG/1
50 TABLET ORAL 3 TIMES DAILY
COMMUNITY
End: 2019-08-21 | Stop reason: ALTCHOICE

## 2019-07-22 RX ORDER — LEVOTHYROXINE SODIUM 0.12 MG/1
125 TABLET ORAL DAILY
Qty: 30 TABLET | Refills: 2 | Status: SHIPPED | OUTPATIENT
Start: 2019-07-22 | End: 2019-11-07 | Stop reason: SDUPTHER

## 2019-07-22 NOTE — PROGRESS NOTES
Subjective:   Chief Complaint   Patient presents with    Abdominal Pain        Patient ID: Meme De La Cruz is a 60 y o  female  Patient and office concerned about the abdomen pain in her left upper quadrant her and it has been going on for 1 week and she associated with nausea but no vomiting no diarrhea no constipation and the pain is worse when she sleep on her left side and patient on me 2019 she did have a abdomen hernia surgery her pain was bad she and going to urgent care over the weekend no record in the chart the per patient her blood work was normal at the urgent care physician give her to antibiotic and ask her to follow with her primary care a deny any weight loss no blood in the stool immune no mucus in the stool no pain with defecation no fever no chills deny any heartburn no burping no bloating      The following portions of the patient's history were reviewed and updated as appropriate: allergies, current medications, past family history, past medical history, past social history, past surgical history and problem list     Review of Systems   Constitutional: Negative for fatigue and fever  HENT: Negative for ear pain, sinus pressure, sinus pain and sore throat  Eyes: Negative for pain and redness  Respiratory: Negative for cough, chest tightness and shortness of breath  Cardiovascular: Negative for chest pain, palpitations and leg swelling  Gastrointestinal: Positive for abdominal pain  Negative for blood in stool, constipation, diarrhea and nausea  Genitourinary: Negative for flank pain, frequency and hematuria  Musculoskeletal: Negative for back pain and joint swelling  Skin: Negative for rash  Neurological: Negative for dizziness, numbness and headaches  Hematological: Does not bruise/bleed easily           Objective:  Vitals:    07/22/19 1504   BP: 124/60   Pulse: 77   Temp: 98 1 °F (36 7 °C)   TempSrc: Tympanic   SpO2: 98%   Weight: 72 6 kg (160 lb)   Height: 5' 3 5" (1 613 m)      Physical Exam   Constitutional: She is oriented to person, place, and time  She appears well-developed and well-nourished  HENT:   Head: Normocephalic  Right Ear: External ear normal    Left Ear: External ear normal    Eyes: Conjunctivae and EOM are normal  Right eye exhibits no discharge  Left eye exhibits no discharge  Neck: No JVD present  Cardiovascular: Normal rate, regular rhythm and normal heart sounds  Exam reveals no gallop  No murmur heard  Pulmonary/Chest: Effort normal  No respiratory distress  She has no wheezes  She has no rales  She exhibits no tenderness  Abdominal: She exhibits no mass  There is tenderness in the left upper quadrant  There is no rebound  Musculoskeletal: She exhibits no edema or tenderness  Neurological: She is alert and oriented to person, place, and time  Skin: No rash noted  No erythema  Assessment/Plan:    Left upper quadrant pain  Symptomatic new diagnosis patient had a history of abdomen surgery recently plan for CT scan of the abdomen with contrast recent blood work show white blood cell was normal no diarrhea no blood in the stool no weight loss no fever no chills will hold on the antibiotic       Diagnoses and all orders for this visit:    Left upper quadrant pain  -     CT abdomen w contrast; Future    Acquired hypothyroidism  -     levothyroxine 125 mcg tablet; Take 1 tablet (125 mcg total) by mouth daily    Screening mammogram, encounter for  -     Mammo screening bilateral w cad; Future    Other orders  -     CONTOUR TEST test strip; 2 (two) times a day Test  -     metroNIDAZOLE (FLAGYL) 250 mg tablet; 250 mg 3 (three) times a day  -     ciprofloxacin (CIPRO) 500 mg tablet; 500 mg 2 (two) times a day  -     traMADol (ULTRAM) 50 mg tablet;  Take 50 mg by mouth 3 (three) times a day 1-2 tablets tid prn for pain

## 2019-07-23 PROBLEM — R10.12 LEFT UPPER QUADRANT PAIN: Status: ACTIVE | Noted: 2019-07-23

## 2019-07-23 PROBLEM — R10.12 LEFT UPPER QUADRANT PAIN: Status: ACTIVE | Noted: 2019-07-22

## 2019-07-23 NOTE — ASSESSMENT & PLAN NOTE
Symptomatic new diagnosis patient had a history of abdomen surgery recently plan for CT scan of the abdomen with contrast recent blood work show white blood cell was normal no diarrhea no blood in the stool no weight loss no fever no chills will hold on the antibiotic

## 2019-07-26 ENCOUNTER — HOSPITAL ENCOUNTER (EMERGENCY)
Facility: HOSPITAL | Age: 59
Discharge: HOME | End: 2019-07-26
Attending: EMERGENCY MEDICINE
Payer: COMMERCIAL

## 2019-07-26 ENCOUNTER — APPOINTMENT (EMERGENCY)
Dept: CT IMAGING | Facility: HOSPITAL | Age: 59
End: 2019-07-26
Payer: COMMERCIAL

## 2019-07-26 VITALS
SYSTOLIC BLOOD PRESSURE: 155 MMHG | OXYGEN SATURATION: 99 % | BODY MASS INDEX: 27.91 KG/M2 | RESPIRATION RATE: 18 BRPM | DIASTOLIC BLOOD PRESSURE: 61 MMHG | HEART RATE: 62 BPM | TEMPERATURE: 97.6 F | WEIGHT: 160.05 LBS

## 2019-07-26 DIAGNOSIS — M54.50 LEFT-SIDED LOW BACK PAIN WITHOUT SCIATICA: ICD-10-CM

## 2019-07-26 DIAGNOSIS — R10.9 ABDOMINAL PAIN: Primary | ICD-10-CM

## 2019-07-26 LAB
ALBUMIN SERPL BCP-MCNC: 3.8 G/DL (ref 3.5–5)
ALP SERPL-CCNC: 93 U/L (ref 46–116)
ALT SERPL W P-5'-P-CCNC: 27 U/L (ref 12–78)
ANION GAP SERPL CALCULATED.3IONS-SCNC: 6 MMOL/L (ref 4–13)
APTT PPP: 27 SECONDS (ref 23–37)
AST SERPL W P-5'-P-CCNC: 21 U/L (ref 5–45)
BACTERIA UR QL AUTO: ABNORMAL /HPF
BASOPHILS # BLD AUTO: 0.02 THOUSANDS/ΜL (ref 0–0.1)
BASOPHILS NFR BLD AUTO: 0 % (ref 0–1)
BILIRUB DIRECT SERPL-MCNC: 0.13 MG/DL (ref 0–0.2)
BILIRUB SERPL-MCNC: 0.57 MG/DL (ref 0.2–1)
BILIRUB UR QL STRIP: NEGATIVE
BUN SERPL-MCNC: 16 MG/DL (ref 5–25)
CALCIUM SERPL-MCNC: 9.5 MG/DL (ref 8.3–10.1)
CHLORIDE SERPL-SCNC: 107 MMOL/L (ref 100–108)
CLARITY UR: CLEAR
CO2 SERPL-SCNC: 30 MMOL/L (ref 21–32)
COLOR UR: YELLOW
CREAT SERPL-MCNC: 0.76 MG/DL (ref 0.6–1.3)
EOSINOPHIL # BLD AUTO: 0.25 THOUSAND/ΜL (ref 0–0.61)
EOSINOPHIL NFR BLD AUTO: 4 % (ref 0–6)
ERYTHROCYTE [DISTWIDTH] IN BLOOD BY AUTOMATED COUNT: 12.1 % (ref 11.6–15.1)
GFR SERPL CREATININE-BSD FRML MDRD: 86 ML/MIN/1.73SQ M
GLUCOSE SERPL-MCNC: 123 MG/DL (ref 65–140)
GLUCOSE UR STRIP-MCNC: NEGATIVE MG/DL
HCT VFR BLD AUTO: 42.3 % (ref 34.8–46.1)
HGB BLD-MCNC: 14.2 G/DL (ref 11.5–15.4)
HGB UR QL STRIP.AUTO: ABNORMAL
IMM GRANULOCYTES # BLD AUTO: 0 THOUSAND/UL (ref 0–0.2)
IMM GRANULOCYTES NFR BLD AUTO: 0 % (ref 0–2)
INR PPP: 0.95 (ref 0.84–1.19)
KETONES UR STRIP-MCNC: NEGATIVE MG/DL
LEUKOCYTE ESTERASE UR QL STRIP: NEGATIVE
LIPASE SERPL-CCNC: 86 U/L (ref 73–393)
LYMPHOCYTES # BLD AUTO: 2.41 THOUSANDS/ΜL (ref 0.6–4.47)
LYMPHOCYTES NFR BLD AUTO: 40 % (ref 14–44)
MAGNESIUM SERPL-MCNC: 2.1 MG/DL (ref 1.6–2.6)
MCH RBC QN AUTO: 30.3 PG (ref 26.8–34.3)
MCHC RBC AUTO-ENTMCNC: 33.6 G/DL (ref 31.4–37.4)
MCV RBC AUTO: 90 FL (ref 82–98)
MONOCYTES # BLD AUTO: 0.45 THOUSAND/ΜL (ref 0.17–1.22)
MONOCYTES NFR BLD AUTO: 8 % (ref 4–12)
NEUTROPHILS # BLD AUTO: 2.83 THOUSANDS/ΜL (ref 1.85–7.62)
NEUTS SEG NFR BLD AUTO: 48 % (ref 43–75)
NITRITE UR QL STRIP: NEGATIVE
NON-SQ EPI CELLS URNS QL MICRO: ABNORMAL /HPF
NRBC BLD AUTO-RTO: 0 /100 WBCS
PH UR STRIP.AUTO: 5.5 [PH] (ref 4.5–8)
PLATELET # BLD AUTO: 227 THOUSANDS/UL (ref 149–390)
PMV BLD AUTO: 11 FL (ref 8.9–12.7)
POTASSIUM SERPL-SCNC: 4 MMOL/L (ref 3.5–5.3)
PROT SERPL-MCNC: 7.4 G/DL (ref 6.4–8.2)
PROT UR STRIP-MCNC: NEGATIVE MG/DL
PROTHROMBIN TIME: 12.8 SECONDS (ref 11.6–14.5)
RBC # BLD AUTO: 4.68 MILLION/UL (ref 3.81–5.12)
RBC #/AREA URNS AUTO: ABNORMAL /HPF
SODIUM SERPL-SCNC: 143 MMOL/L (ref 136–145)
SP GR UR STRIP.AUTO: 1.02 (ref 1–1.03)
UROBILINOGEN UR QL STRIP.AUTO: 0.2 E.U./DL
WBC # BLD AUTO: 5.96 THOUSAND/UL (ref 4.31–10.16)
WBC #/AREA URNS AUTO: ABNORMAL /HPF

## 2019-07-26 PROCEDURE — 85610 PROTHROMBIN TIME: CPT | Performed by: EMERGENCY MEDICINE

## 2019-07-26 PROCEDURE — 83735 ASSAY OF MAGNESIUM: CPT | Performed by: EMERGENCY MEDICINE

## 2019-07-26 PROCEDURE — 85025 COMPLETE CBC W/AUTO DIFF WBC: CPT | Performed by: EMERGENCY MEDICINE

## 2019-07-26 PROCEDURE — 80048 BASIC METABOLIC PNL TOTAL CA: CPT | Performed by: EMERGENCY MEDICINE

## 2019-07-26 PROCEDURE — 36415 COLL VENOUS BLD VENIPUNCTURE: CPT | Performed by: EMERGENCY MEDICINE

## 2019-07-26 PROCEDURE — 99284 EMERGENCY DEPT VISIT MOD MDM: CPT | Performed by: EMERGENCY MEDICINE

## 2019-07-26 PROCEDURE — 81001 URINALYSIS AUTO W/SCOPE: CPT

## 2019-07-26 PROCEDURE — 80076 HEPATIC FUNCTION PANEL: CPT | Performed by: EMERGENCY MEDICINE

## 2019-07-26 PROCEDURE — 74177 CT ABD & PELVIS W/CONTRAST: CPT

## 2019-07-26 PROCEDURE — 85730 THROMBOPLASTIN TIME PARTIAL: CPT | Performed by: EMERGENCY MEDICINE

## 2019-07-26 PROCEDURE — 99284 EMERGENCY DEPT VISIT MOD MDM: CPT

## 2019-07-26 PROCEDURE — 83690 ASSAY OF LIPASE: CPT | Performed by: EMERGENCY MEDICINE

## 2019-07-26 PROCEDURE — 96374 THER/PROPH/DIAG INJ IV PUSH: CPT

## 2019-07-26 RX ORDER — MAGNESIUM HYDROXIDE/ALUMINUM HYDROXICE/SIMETHICONE 120; 1200; 1200 MG/30ML; MG/30ML; MG/30ML
30 SUSPENSION ORAL ONCE
Status: COMPLETED | OUTPATIENT
Start: 2019-07-26 | End: 2019-07-26

## 2019-07-26 RX ORDER — DICYCLOMINE HCL 20 MG
20 TABLET ORAL EVERY 6 HOURS PRN
Qty: 12 TABLET | Refills: 0 | Status: SHIPPED | OUTPATIENT
Start: 2019-07-26 | End: 2020-01-13 | Stop reason: SDUPTHER

## 2019-07-26 RX ORDER — DICYCLOMINE HCL 20 MG
20 TABLET ORAL ONCE
Status: COMPLETED | OUTPATIENT
Start: 2019-07-26 | End: 2019-07-26

## 2019-07-26 RX ORDER — LIDOCAINE HYDROCHLORIDE 20 MG/ML
10 SOLUTION OROPHARYNGEAL ONCE
Status: COMPLETED | OUTPATIENT
Start: 2019-07-26 | End: 2019-07-26

## 2019-07-26 RX ORDER — CYCLOBENZAPRINE HCL 10 MG
10 TABLET ORAL 3 TIMES DAILY PRN
Qty: 12 TABLET | Refills: 0 | Status: SHIPPED | OUTPATIENT
Start: 2019-07-26 | End: 2019-08-21 | Stop reason: ALTCHOICE

## 2019-07-26 RX ORDER — IBUPROFEN 600 MG/1
600 TABLET ORAL EVERY 8 HOURS PRN
Qty: 30 TABLET | Refills: 0 | Status: SHIPPED | OUTPATIENT
Start: 2019-07-26 | End: 2019-08-21 | Stop reason: ALTCHOICE

## 2019-07-26 RX ORDER — KETOROLAC TROMETHAMINE 30 MG/ML
15 INJECTION, SOLUTION INTRAMUSCULAR; INTRAVENOUS ONCE
Status: COMPLETED | OUTPATIENT
Start: 2019-07-26 | End: 2019-07-26

## 2019-07-26 RX ADMIN — LIDOCAINE HYDROCHLORIDE 10 ML: 20 SOLUTION ORAL; TOPICAL at 07:56

## 2019-07-26 RX ADMIN — IOHEXOL 100 ML: 350 INJECTION, SOLUTION INTRAVENOUS at 09:12

## 2019-07-26 RX ADMIN — DICYCLOMINE HYDROCHLORIDE 20 MG: 20 TABLET ORAL at 08:56

## 2019-07-26 RX ADMIN — KETOROLAC TROMETHAMINE 15 MG: 30 INJECTION, SOLUTION INTRAMUSCULAR at 07:56

## 2019-07-26 RX ADMIN — ALUMINUM HYDROXIDE, MAGNESIUM HYDROXIDE, AND SIMETHICONE 30 ML: 200; 200; 20 SUSPENSION ORAL at 07:56

## 2019-07-26 NOTE — ED PROVIDER NOTES
History  Chief Complaint   Patient presents with    Abdominal Pain     Pt reports  epigastric pain that radiates to the back  Pt denies N/V/D  denies CP Pt reports "went to PCP and have aCT scan in august  I cant wait that long  I cant sleep"       History provided by:  Patient   used: No    Medical Problem - Major   Location:  Left-sided back pain and left-sided upper quadrant pain  Severity:  Moderate  Onset quality:  Gradual  Duration: Several weeks  Timing:  Constant  Progression:  Worsening  Chronicity:  New  Context:  Saw her primary care doctor has a CT scan scheduled for sometime in August   No nausea vomiting or diarrhea  No changes in urinary symptoms  No fevers  Does not recall injuring herself but this is worse with movement at times  Relieved by:  Nothing  Worsened by: Movement or palpation  Ineffective treatments:  None tried  Associated symptoms: abdominal pain    Associated symptoms: no chest pain, no cough, no diarrhea, no fever, no headaches, no nausea, no rash, no shortness of breath and no vomiting        Prior to Admission Medications   Prescriptions Last Dose Informant Patient Reported? Taking?    CONTOUR TEST test strip  Self Yes Yes   Si (two) times a day Test   albuterol (PROVENTIL HFA,VENTOLIN HFA) 90 mcg/act inhaler  Self No Yes   Sig: Inhale 2 puffs every 6 (six) hours as needed for wheezing   atorvastatin (LIPITOR) 10 mg tablet  Self No Yes   Sig: Take 1 tablet (10 mg total) by mouth daily   ciprofloxacin (CIPRO) 500 mg tablet  Self Yes Yes   Si mg 2 (two) times a day   levothyroxine 125 mcg tablet   No Yes   Sig: Take 1 tablet (125 mcg total) by mouth daily   metFORMIN (GLUCOPHAGE) 500 mg tablet  Self No Yes   Sig: Take 1 tablet (500 mg total) by mouth daily   metroNIDAZOLE (FLAGYL) 250 mg tablet  Self Yes Yes   Si mg 3 (three) times a day   traMADol (ULTRAM) 50 mg tablet  Self Yes Yes   Sig: Take 50 mg by mouth 3 (three) times a day 1-2 tablets tid prn for pain      Facility-Administered Medications: None       Past Medical History:   Diagnosis Date    Anxiety 3/29/2019    Asthma     Cataract     viral    Chronic kidney disease     Hyperlipidemia     Hypertension     Hypothyroid     IFG (impaired fasting glucose) 2019    Incisional hernia     Insomnia     Kidney stone     in past    Osteoarthritis     RA (rheumatoid arthritis) ([facility] [address] ) 2015    pt unsure     Type 2 diabetes mellitus without complication, without long-term current use of insulin ([facility] [address] ) 2019    NIDDM    Uses Hungarian as primary spoken language     Varicose veins of both lower extremities     Wears dentures      upper partials    Wears glasses        Past Surgical History:   Procedure Laterality Date    APPENDECTOMY       SECTION      x 3    CHOLECYSTECTOMY      COLONOSCOPY      HERNIA REPAIR      HYSTERECTOMY      HI LAP, INCISIONAL HERNIA REPAIR,REDUCIBLE N/A 2019    Procedure: REPAIR HERNIA INCISIONAL LAPAROSCOPIC W/ ROBOTICS;  Surgeon: Greg Miguel MD;  Location: [facility];  Service: General       Family History   Problem Relation Age of Onset    Emphysema Mother      I have reviewed and agree with the history as documented  Social History     Tobacco Use    Smoking status: Never Smoker    Smokeless tobacco: Never Used    Tobacco comment: no passive smoke exposure   Substance Use Topics    Alcohol use: Yes     Frequency: Monthly or less     Drinks per session: 1 or 2     Binge frequency: Never     Comment: liquor    Drug use: No        Review of Systems   Constitutional: Negative for chills and fever  Respiratory: Negative for cough, chest tightness and shortness of breath  Cardiovascular: Negative for chest pain  Gastrointestinal: Positive for abdominal pain  Negative for diarrhea, nausea and vomiting  Genitourinary: Negative for difficulty urinating and dysuria  Musculoskeletal: Positive for back pain   Negative for gait problem  Skin: Negative for rash  Neurological: Negative for weakness, numbness and headaches  All other systems reviewed and are negative  Physical Exam  Physical Exam   Constitutional: She appears well-developed and well-nourished  She is cooperative  Non-toxic appearance  She does not have a sickly appearance  She does not appear ill  No distress  HENT:   Head: Normocephalic and atraumatic  Right Ear: Hearing normal  No drainage or swelling  Left Ear: Hearing normal  No drainage or swelling  Mouth/Throat: Mucous membranes are normal    Eyes: Conjunctivae and lids are normal  Right eye exhibits no discharge  Left eye exhibits no discharge  Neck: Trachea normal and normal range of motion  No JVD present  Cardiovascular: Normal rate, regular rhythm, normal heart sounds, intact distal pulses and normal pulses  Exam reveals no gallop and no friction rub  No murmur heard  Pulmonary/Chest: Effort normal and breath sounds normal  No stridor  No respiratory distress  She has no wheezes  She has no rales  She exhibits no tenderness  Abdominal: Soft  Normal appearance  There is tenderness in the left upper quadrant  There is no rebound, no guarding and no CVA tenderness  Musculoskeletal: Normal range of motion  She exhibits no edema  Lumbar back: She exhibits tenderness and pain  She exhibits normal range of motion, no bony tenderness, no swelling, no edema and no spasm  Back:    Lymphadenopathy:     She has no cervical adenopathy  Neurological: She is alert  She has normal strength  No cranial nerve deficit  She exhibits normal muscle tone  GCS eye subscore is 4  GCS verbal subscore is 5  GCS motor subscore is 6  Skin: Skin is warm, dry and intact  No rash noted  She is not diaphoretic  No pallor  Psychiatric: She has a normal mood and affect  Her speech is normal  Cognition and memory are normal    Nursing note and vitals reviewed        Vital Signs  ED Triage Vitals Temperature Pulse Respirations Blood Pressure SpO2   07/26/19 0644 07/26/19 0644 07/26/19 0644 07/26/19 0644 07/26/19 0644   97 6 °F (36 4 °C) 69 16 165/75 98 %      Temp src Heart Rate Source Patient Position - Orthostatic VS BP Location FiO2 (%)   -- 07/26/19 0855 07/26/19 0855 07/26/19 0855 --    Monitor Standing Right arm       Pain Score       07/26/19 0644       8           Vitals:    07/26/19 0644 07/26/19 0855 07/26/19 1038   BP: 165/75 144/76 155/61   Pulse: 69 65 62   Patient Position - Orthostatic VS:  Standing Sitting         Visual Acuity      ED Medications  Medications   ketorolac (TORADOL) injection 15 mg (15 mg Intravenous Given 7/26/19 0756)   aluminum-magnesium hydroxide-simethicone (MYLANTA) 200-200-20 mg/5 mL oral suspension 30 mL (30 mL Oral Given 7/26/19 0756)   Lidocaine Viscous HCl (XYLOCAINE) 2 % mucosal solution 10 mL (10 mL Swish & Swallow Given 7/26/19 0756)   dicyclomine (BENTYL) tablet 20 mg (20 mg Oral Given 7/26/19 0856)   iohexol (OMNIPAQUE) 350 MG/ML injection (MULTI-DOSE) 100 mL (100 mL Intravenous Given 7/26/19 0912)       Diagnostic Studies  Results Reviewed     Procedure Component Value Units Date/Time    Urine Microscopic [[id number]]  (Abnormal) Collected:  07/26/19 0804    Lab Status:  Final result Specimen:  Urine, Clean Catch Updated:  07/26/19 0836     RBC, UA 0-1 /hpf      WBC, UA 0-1 /hpf      Epithelial Cells Occasional /hpf      Bacteria, UA Occasional /hpf     Hepatic function panel [[id number]]  (Normal) Collected:  07/26/19 0755    Lab Status:  Final result Specimen:  Blood from Arm, Right Updated:  07/26/19 0828     Total Bilirubin 0 57 mg/dL      Bilirubin, Direct 0 13 mg/dL      Alkaline Phosphatase 93 U/L      AST 21 U/L      ALT 27 U/L      Total Protein 7 4 g/dL      Albumin 3 8 g/dL     Lipase [[id number]]  (Normal) Collected:  07/26/19 0755    Lab Status:  Final result Specimen:  Blood from Arm, Right Updated:  07/26/19 0828     Lipase 86 u/L     Basic metabolic panel [[id number]] Collected:  07/26/19 0755    Lab Status:  Final result Specimen:  Blood from Arm, Right Updated:  07/26/19 7769     Sodium 143 mmol/L      Potassium 4 0 mmol/L      Chloride 107 mmol/L      CO2 30 mmol/L      ANION GAP 6 mmol/L      BUN 16 mg/dL      Creatinine 0 76 mg/dL      Glucose 123 mg/dL      Calcium 9 5 mg/dL      eGFR 86 ml/min/1 73sq m     Narrative:       [place] guidelines for Chronic Kidney Disease (CKD):     Stage 1 with normal or high GFR (GFR > 90 mL/min/1 73 square meters)    Stage 2 Mild CKD (GFR = 60-89 mL/min/1 73 square meters)    Stage 3A Moderate CKD (GFR = 45-59 mL/min/1 73 square meters)    Stage 3B Moderate CKD (GFR = 30-44 mL/min/1 73 square meters)    Stage 4 Severe CKD (GFR = 15-29 mL/min/1 73 square meters)    Stage 5 End Stage CKD (GFR <15 mL/min/1 73 square meters)  Note: GFR calculation is accurate only with a steady state creatinine    Magnesium [[id number]]  (Normal) Collected:  07/26/19 0755    Lab Status:  Final result Specimen:  Blood from Arm, Right Updated:  07/26/19 0828     Magnesium 2 1 mg/dL     Protime-INR [[id number]]  (Normal) Collected:  07/26/19 0755    Lab Status:  Final result Specimen:  Blood from Arm, Right Updated:  07/26/19 0822     Protime 12 8 seconds      INR 0 95    APTT [[id number]]  (Normal) Collected:  07/26/19 0755    Lab Status:  Final result Specimen:  Blood from Arm, Right Updated:  07/26/19 0822     PTT 27 seconds     CBC and differential [[id number]] Collected:  07/26/19 0755    Lab Status:  Final result Specimen:  Blood from Arm, Right Updated:  07/26/19 0814     WBC 5 96 Thousand/uL      RBC 4 68 Million/uL      Hemoglobin 14 2 g/dL      Hematocrit 42 3 %      MCV 90 fL      MCH 30 3 pg      MCHC 33 6 g/dL      RDW 12 1 %      MPV 11 0 fL      Platelets 659 Thousands/uL      nRBC 0 /100 WBCs      Neutrophils Relative 48 %      Immat GRANS % 0 %      Lymphocytes Relative 40 %      Monocytes Relative 8 %      Eosinophils Relative 4 %      Basophils Relative 0 %      Neutrophils Absolute 2 83 Thousands/µL      Immature Grans Absolute 0 00 Thousand/uL      Lymphocytes Absolute 2 41 Thousands/µL      Monocytes Absolute 0 45 Thousand/µL      Eosinophils Absolute 0 25 Thousand/µL      Basophils Absolute 0 02 Thousands/µL     POCT urinalysis dipstick [[id number]]  (Abnormal) Resulted:  07/26/19 0753    Lab Status:  Final result Specimen:  Urine Updated:  07/26/19 0754    ED Urine Macroscopic [[id number]]  (Abnormal) Collected:  07/26/19 0804    Lab Status:  Final result Specimen:  Urine Updated:  07/26/19 0752     Color, UA Yellow     Clarity, UA Clear     pH, UA 5 5     Leukocytes, UA Negative     Nitrite, UA Negative     Protein, UA Negative mg/dl      Glucose, UA Negative mg/dl      Ketones, UA Negative mg/dl      Urobilinogen, UA 0 2 E U /dl      Bilirubin, UA Negative     Blood, UA Trace     Specific [place], UA 1 020    Narrative:       [facility] RESULT                 CT abdomen pelvis with contrast   Final Result by Robe Dukes MD (07/26 6074)      No acute abnormality within the abdomen or pelvis  Workstation performed: [id number]                    Procedures  Procedures       ED Course                               MDM  Number of Diagnoses or Management Options  Abdominal pain:   Left-sided low back pain without sciatica:   Diagnosis management comments: Laboratory studies unremarkable  CT scan unremarkable  Possible that this could be some type of musculoskeletal pain as well  GI cocktail did not seem to cause any changes in any of her symptoms  Patient to follow up with her primary care doctor  No acute inflammatory changes seen on CT nor hernia nor any type of mass or obstructive type pathology  No evidence of any kidney stone         Amount and/or Complexity of Data Reviewed  Clinical lab tests: reviewed and ordered  Tests in the radiology section of CPT®: ordered and reviewed  Review and summarize past medical records: yes    Patient Progress  Patient progress: stable      Disposition  Final diagnoses:   Abdominal pain   Left-sided low back pain without sciatica     Time reflects when diagnosis was documented in both MDM as applicable and the Disposition within this note     Time User Action Codes Description Comment    7/26/2019 10:45 AM Lali Leigh Add [R10 9] Abdominal pain     7/26/2019 10:45 AM Lali Leigh Add [M54 5] Left-sided low back pain without sciatica       ED Disposition     ED Disposition Condition Date/Time Comment    Discharge Stable Fri Jul 26, 2019 10:45 AM Susie Vargas discharge to home/self care  Follow-up Information     Follow up With Specialties Details Why Smiley Carrera MD Family Medicine Schedule an appointment as soon as possible for a visit in 2 days If symptoms worsen [address]  [address]            Patient's Medications   Discharge Prescriptions    CYCLOBENZAPRINE (FLEXERIL) 10 MG TABLET    Take 1 tablet (10 mg total) by mouth 3 (three) times a day as needed for muscle spasms for up to 10 days       Start Date: 7/26/2019 End Date: 8/5/2019       Order Dose: 10 mg       Quantity: 12 tablet    Refills: 0    DICYCLOMINE (BENTYL) 20 MG TABLET    Take 1 tablet (20 mg total) by mouth every 6 (six) hours as needed (abd pain) for up to 12 doses       Start Date: 7/26/2019 End Date: --       Order Dose: 20 mg       Quantity: 12 tablet    Refills: 0    IBUPROFEN (MOTRIN) 600 MG TABLET    Take 1 tablet (600 mg total) by mouth every 8 (eight) hours as needed for moderate pain for up to 10 days       Start Date: 7/26/2019 End Date: 8/5/2019       Order Dose: 600 mg       Quantity: 30 tablet    Refills: 0     No discharge procedures on file      ED Provider  Electronically Signed by           Jennifer Zapien MD  07/26/19 6038

## 2019-08-21 ENCOUNTER — OFFICE VISIT (OUTPATIENT)
Dept: FAMILY MEDICINE CLINIC | Facility: CLINIC | Age: 59
End: 2019-08-21
Payer: COMMERCIAL

## 2019-08-21 VITALS
TEMPERATURE: 97.8 F | BODY MASS INDEX: 28.51 KG/M2 | WEIGHT: 167 LBS | RESPIRATION RATE: 16 BRPM | OXYGEN SATURATION: 97 % | HEART RATE: 74 BPM | HEIGHT: 64 IN | SYSTOLIC BLOOD PRESSURE: 120 MMHG | DIASTOLIC BLOOD PRESSURE: 68 MMHG

## 2019-08-21 DIAGNOSIS — E78.2 MIXED HYPERLIPIDEMIA: ICD-10-CM

## 2019-08-21 DIAGNOSIS — Z12.31 SCREENING MAMMOGRAM, ENCOUNTER FOR: ICD-10-CM

## 2019-08-21 DIAGNOSIS — E03.9 ACQUIRED HYPOTHYROIDISM: ICD-10-CM

## 2019-08-21 DIAGNOSIS — E11.9 TYPE 2 DIABETES MELLITUS WITHOUT COMPLICATION, WITHOUT LONG-TERM CURRENT USE OF INSULIN (HCC): Primary | ICD-10-CM

## 2019-08-21 DIAGNOSIS — I10 ESSENTIAL HYPERTENSION: ICD-10-CM

## 2019-08-21 PROCEDURE — 3074F SYST BP LT 130 MM HG: CPT | Performed by: FAMILY MEDICINE

## 2019-08-21 PROCEDURE — 3008F BODY MASS INDEX DOCD: CPT | Performed by: FAMILY MEDICINE

## 2019-08-21 PROCEDURE — 99214 OFFICE O/P EST MOD 30 MIN: CPT | Performed by: FAMILY MEDICINE

## 2019-08-21 PROCEDURE — 1036F TOBACCO NON-USER: CPT | Performed by: FAMILY MEDICINE

## 2019-08-21 NOTE — ASSESSMENT & PLAN NOTE
Chronic asymptomatic fair control with the low salt diet encouraged patient to lose weight and continue with her low-salt diet

## 2019-08-21 NOTE — ASSESSMENT & PLAN NOTE
Chronic asymptomatic fair control continue with levothyroxine proper use of medication possible side effect discussed with the patient

## 2019-08-21 NOTE — PROGRESS NOTES
Subjective:   Chief Complaint   Patient presents with    Follow-up     chronic conditions        Patient ID: Sweta Pretty is a 60 y o  female  Patient here follow-up with a chronic condition she has with history and non-insulin-dependent diabetic on metformin 500 mg once a day tolerated well without any side effect and the her blood sugar number of very controlled and deny increased thirsty increased frequency urination no dizziness and no headache and no abdomen pain patient was history of hyperlipidemia on atorvastatin 10 mg tolerated well no muscle pain no rash deny any chest pain short of breath no palpitation no TIA symptom patient's history of hypertension try to controlled with the the low-salt diet deny any chest pain short of breath no dyspnea on exertion no lower extremity edema and no headache no blurred vision patient was history of hypothyroidism on levothyroxine 125 mcg once a day tolerated well deny any heat or cold intolerance mood stable no hair or skin problem  Recent blood work discussed with the patient      The following portions of the patient's history were reviewed and updated as appropriate: allergies, current medications, past family history, past medical history, past social history, past surgical history and problem list     Review of Systems   Constitutional: Negative for fatigue and fever  HENT: Negative for ear pain, sinus pressure, sinus pain and sore throat  Eyes: Negative for pain and redness  Respiratory: Negative for cough, chest tightness and shortness of breath  Cardiovascular: Negative for chest pain, palpitations and leg swelling  Gastrointestinal: Negative for abdominal pain, blood in stool, constipation, diarrhea and nausea  Genitourinary: Negative for flank pain, frequency and hematuria  Musculoskeletal: Negative for back pain and joint swelling  Skin: Negative for rash  Neurological: Negative for dizziness, numbness and headaches  Hematological: Does not bruise/bleed easily  Objective:  Vitals:    08/21/19 1419   BP: 120/68   BP Location: Left arm   Patient Position: Sitting   Cuff Size: Large   Pulse: 74   Resp: 16   Temp: 97 8 °F (36 6 °C)   TempSrc: Tympanic   SpO2: 97%   Weight: 75 8 kg (167 lb)   Height: 5' 3 5" (1 613 m)      Physical Exam   Constitutional: She is oriented to person, place, and time  She appears well-developed and well-nourished  HENT:   Head: Normocephalic  Right Ear: External ear normal    Left Ear: External ear normal    Eyes: Conjunctivae and EOM are normal  Right eye exhibits no discharge  Left eye exhibits no discharge  Neck: No JVD present  Cardiovascular: Normal rate, regular rhythm and normal heart sounds  Exam reveals no gallop  No murmur heard  Pulmonary/Chest: Effort normal  No respiratory distress  She has no wheezes  She has no rales  She exhibits no tenderness  Abdominal: She exhibits no mass  There is no tenderness  There is no rebound  Musculoskeletal: She exhibits no edema or tenderness  Neurological: She is alert and oriented to person, place, and time  Skin: No rash noted  No erythema           Assessment/Plan:    Type 2 diabetes mellitus without complication, without long-term current use of insulin ([facility])  Lab Results   Component Value Date    HGBA1C 6 2 07/16/2019     Chronic asymptomatic fair control continue with the metformin 500 mg once a day improve in her hemoglobin A1c the discussed with the patient proper monitor her blood sugar number discussed with the patient low carb diet and important lose weight patient already on statin negative for microalbuminuria    Hypothyroidism  Chronic asymptomatic fair control continue with levothyroxine proper use of medication possible side effect discussed with the patient    Essential hypertension  Chronic asymptomatic fair control with the low salt diet encouraged patient to lose weight and continue with her low-salt diet    Mixed hyperlipidemia  Chronic asymptomatic fair control continue with atorvastatin 10 mg once a day discussed the patient low-fat diet and important lose weight       Diagnoses and all orders for this visit:    Type 2 diabetes mellitus without complication, without long-term current use of insulin ([facility])    Essential hypertension    Mixed hyperlipidemia    Acquired hypothyroidism    Screening mammogram, encounter for  -     Mammo screening bilateral w 3d & cad; Future

## 2019-08-21 NOTE — ASSESSMENT & PLAN NOTE
Lab Results   Component Value Date    HGBA1C 6 2 07/16/2019     Chronic asymptomatic fair control continue with the metformin 500 mg once a day improve in her hemoglobin A1c the discussed with the patient proper monitor her blood sugar number discussed with the patient low carb diet and important lose weight patient already on statin negative for microalbuminuria

## 2019-08-21 NOTE — ASSESSMENT & PLAN NOTE
Chronic asymptomatic fair control continue with atorvastatin 10 mg once a day discussed the patient low-fat diet and important lose weight

## 2019-09-16 DIAGNOSIS — E11.9 TYPE 2 DIABETES MELLITUS WITHOUT COMPLICATION, WITHOUT LONG-TERM CURRENT USE OF INSULIN (HCC): ICD-10-CM

## 2019-10-08 ENCOUNTER — HOSPITAL ENCOUNTER (OUTPATIENT)
Dept: MAMMOGRAPHY | Facility: MEDICAL CENTER | Age: 59
Discharge: HOME | End: 2019-10-08
Payer: COMMERCIAL

## 2019-10-08 VITALS — WEIGHT: 167 LBS | HEIGHT: 63 IN | BODY MASS INDEX: 29.59 KG/M2

## 2019-10-08 DIAGNOSIS — Z12.31 SCREENING MAMMOGRAM, ENCOUNTER FOR: ICD-10-CM

## 2019-10-08 PROCEDURE — 77067 SCR MAMMO BI INCL CAD: CPT

## 2019-10-08 PROCEDURE — 77063 BREAST TOMOSYNTHESIS BI: CPT

## 2019-10-09 ENCOUNTER — TELEPHONE (OUTPATIENT)
Dept: FAMILY MEDICINE CLINIC | Facility: CLINIC | Age: 59
End: 2019-10-09

## 2019-11-07 DIAGNOSIS — E03.9 ACQUIRED HYPOTHYROIDISM: ICD-10-CM

## 2019-11-07 DIAGNOSIS — E78.2 MIXED HYPERLIPIDEMIA: ICD-10-CM

## 2019-11-07 RX ORDER — ATORVASTATIN CALCIUM 10 MG/1
10 TABLET, FILM COATED ORAL DAILY
Qty: 30 TABLET | Refills: 1 | Status: SHIPPED | OUTPATIENT
Start: 2019-11-07 | End: 2019-12-11 | Stop reason: SDUPTHER

## 2019-11-07 RX ORDER — LEVOTHYROXINE SODIUM 0.12 MG/1
125 TABLET ORAL DAILY
Qty: 30 TABLET | Refills: 1 | Status: SHIPPED | OUTPATIENT
Start: 2019-11-07 | End: 2019-12-11 | Stop reason: SDUPTHER

## 2019-12-11 ENCOUNTER — OFFICE VISIT (OUTPATIENT)
Dept: FAMILY MEDICINE CLINIC | Facility: CLINIC | Age: 59
End: 2019-12-11
Payer: COMMERCIAL

## 2019-12-11 VITALS
TEMPERATURE: 96.3 F | DIASTOLIC BLOOD PRESSURE: 72 MMHG | OXYGEN SATURATION: 98 % | WEIGHT: 164 LBS | HEIGHT: 64 IN | RESPIRATION RATE: 16 BRPM | SYSTOLIC BLOOD PRESSURE: 118 MMHG | HEART RATE: 72 BPM | BODY MASS INDEX: 28 KG/M2

## 2019-12-11 DIAGNOSIS — E78.2 MIXED HYPERLIPIDEMIA: ICD-10-CM

## 2019-12-11 DIAGNOSIS — E11.9 TYPE 2 DIABETES MELLITUS WITHOUT COMPLICATION, WITHOUT LONG-TERM CURRENT USE OF INSULIN (HCC): ICD-10-CM

## 2019-12-11 DIAGNOSIS — E03.9 ACQUIRED HYPOTHYROIDISM: ICD-10-CM

## 2019-12-11 DIAGNOSIS — Z00.01 ENCOUNTER FOR WELL ADULT EXAM WITH ABNORMAL FINDINGS: Primary | ICD-10-CM

## 2019-12-11 PROCEDURE — 99396 PREV VISIT EST AGE 40-64: CPT | Performed by: FAMILY MEDICINE

## 2019-12-11 RX ORDER — ATORVASTATIN CALCIUM 10 MG/1
10 TABLET, FILM COATED ORAL DAILY
Qty: 30 TABLET | Refills: 2 | Status: SHIPPED | OUTPATIENT
Start: 2019-12-11 | End: 2020-02-26 | Stop reason: SDUPTHER

## 2019-12-11 RX ORDER — BLOOD SUGAR DIAGNOSTIC
STRIP MISCELLANEOUS
Qty: 100 EACH | Refills: 2 | Status: SHIPPED | OUTPATIENT
Start: 2019-12-11 | End: 2020-01-08 | Stop reason: ALTCHOICE

## 2019-12-11 RX ORDER — LEVOTHYROXINE SODIUM 0.12 MG/1
125 TABLET ORAL DAILY
Qty: 30 TABLET | Refills: 2 | Status: SHIPPED | OUTPATIENT
Start: 2019-12-11 | End: 2020-02-26 | Stop reason: SDUPTHER

## 2019-12-11 NOTE — PROGRESS NOTES
FAMILY PRACTICE HEALTH MAINTENANCE OFFICE VISIT  [facility] [place] [facility]    NAME: Sandeep Blackburn  AGE: 60 y o  SEX: female  : 1960     DATE: 2019    Assessment and Plan     1  Encounter for well adult exam with abnormal findings  Assessment & Plan:  Advice and education were given regarding nutrition, aerobic exercises, weight bearing exercises, cardiovascular risk reduction, fall risk reduction, and age appropriate supplements  The patient was counseled regarding instructions for management, risk factor reductions, prognosis, risks and benefits of treatment options, patient and family education, and importance of compliance with treatment  Patient declined flu shot for today      2  BMI 28 0-28 9,adult  Assessment & Plan:  The BMI is above average  BMI counseling and education was provided to the patient  Nutrition recommendations include reducing portion sizes, decreasing overall calorie intake, 3-5 servings of fruits/vegetables daily, reducing fast food intake, consuming healthier snacks, decreasing soda and/or juice intake, moderation in carbohydrate intake and reducing intake of saturated fat and trans fat  Exercise recommendations include moderate aerobic physical activity for 150 minutes/week, exercising 3-5 times per week and joining a gym  Orders:  -     Basic metabolic panel; Future    3  Type 2 diabetes mellitus without complication, without long-term current use of insulin (HCC)  -     CONTOUR TEST test strip; Test blood sugars twice daily  -     metFORMIN (GLUCOPHAGE) 500 mg tablet; Take 1 tablet (500 mg total) by mouth daily  -     CBC and differential; Future  -     Hemoglobin A1C; Future  -     Microalbumin / creatinine urine ratio    4  Acquired hypothyroidism  -     levothyroxine 125 mcg tablet; Take 1 tablet (125 mcg total) by mouth daily  -     TSH, 3rd generation with Free T4 reflex; Future    5   Mixed hyperlipidemia  -     atorvastatin (LIPITOR) 10 mg tablet; Take 1 tablet (10 mg total) by mouth daily  -     Lipid panel; Future      · Patient Counseling:   · Nutrition: Stressed importance of a well balanced diet, moderation of sodium/saturated fat, caloric balance and sufficient intake of fiber  · Exercise: Stressed the importance of regular exercise with a goal of 150 minutes per week  · Dental Health: Discussed daily flossing and brushing and regular dental visits     · Immunizations reviewed: Declined recommended vaccinations  · Discussed benefits of:  Colon Cancer Screening, Mammogram , Cervical Cancer screening and Screening labs   BMI Counseling: Body mass index is 28 6 kg/m²  Discussed with patient's BMI with her         Chief Complaint     Chief Complaint   Patient presents with    Physical Exam     Yearly Physical Exam        History of Present Illness     Patient here for annual physical exam deny any chest pain short of breath no palpitation no cough no wheezing no hematosis deny any headache no blurred vision no weakness or lateralized of the symptom no abdomen pain nausea vomiting or diarrhea no renal problem no rash no fever no change in the weight and no change in the mood deny smoking she does do watch for the low carb diet and the does do exercise frequently 3 to 4 times a week      Well Adult Physical   Patient here for a comprehensive physical exam       Diet and Physical Activity  Diet: low carbohydrate diet  Exercise: frequently      Depression Screen  PHQ-9 Depression Screening    PHQ-9:    Frequency of the following problems over the past two weeks:               General Health  Hearing: Normal:  bilateral  Vision: wears glasses  Dental: brushes teeth twice daily    Reproductive Health  Follows with gynecologist      The following portions of the patient's history were reviewed and updated as appropriate: allergies, current medications, past family history, past medical history, past social history, past surgical history and problem list     Review of Systems     Review of Systems   Constitutional: Negative for fatigue and fever  HENT: Negative for ear pain, sinus pressure, sinus pain and sore throat  Eyes: Negative for pain and redness  Respiratory: Negative for cough, chest tightness and shortness of breath  Cardiovascular: Negative for chest pain, palpitations and leg swelling  Gastrointestinal: Negative for abdominal pain, blood in stool, constipation, diarrhea and nausea  Endocrine: Negative for heat intolerance and polydipsia  Genitourinary: Negative for flank pain, frequency and hematuria  Musculoskeletal: Negative for back pain and joint swelling  Skin: Negative for rash  Neurological: Negative for dizziness, numbness and headaches  Hematological: Does not bruise/bleed easily  Psychiatric/Behavioral: Negative for agitation and behavioral problems         Past Medical History     Past Medical History:   Diagnosis Date    Anxiety 3/29/2019    Asthma     Cataract     viral    Chronic kidney disease     Hyperlipidemia     Hypertension     Hypothyroid     IFG (impaired fasting glucose) 2019    Incisional hernia     Insomnia     Kidney stone     in past    Osteoarthritis     RA (rheumatoid arthritis) ([facility] ) 2015    pt unsure     Type 2 diabetes mellitus without complication, without long-term current use of insulin ([facility] ) 2019    NIDDM    Uses Barbadian as primary spoken language     Varicose veins of both lower extremities     Wears dentures      upper partials    Wears glasses        Past Surgical History     Past Surgical History:   Procedure Laterality Date    APPENDECTOMY      BREAST BIOPSY Right     negative     SECTION      x 3    CHOLECYSTECTOMY      COLONOSCOPY      HERNIA REPAIR      HYSTERECTOMY      AL LAP, INCISIONAL HERNIA REPAIR,REDUCIBLE N/A 2019    Procedure: REPAIR HERNIA INCISIONAL LAPAROSCOPIC W/ ROBOTICS;  Surgeon: Loren Christianson MD;  Location: [facility] OR;  Service: General       Social History     Social History     Socioeconomic History    Marital status: /Civil Union     Spouse name: None    Number of children: None    Years of education: None    Highest education level: None   Occupational History    None   Social Needs    Financial resource strain: Not hard at all   [address] insecurity:     Worry: Never true     Inability: Never true    Transportation needs:     Medical: No     Non-medical: No   Tobacco Use    Smoking status: Never Smoker    Smokeless tobacco: Never Used    Tobacco comment: no passive smoke exposure   Substance and Sexual Activity    Alcohol use: Yes     Frequency: Monthly or less     Drinks per session: 1 or 2     Binge frequency: Never     Comment: liquor    Drug use: No    Sexual activity: Yes     Partners: Male   Lifestyle    Physical activity:     Days per week: 5 days     Minutes per session: 30 min    Stress: Not at all   Relationships    Social connections:     Talks on phone: More than three times a week     Gets together: More than three times a week     Attends Alevism service: More than 4 times per year     Active member of club or organization: No     Attends meetings of clubs or organizations: Never     Relationship status:     Intimate partner violence:     Fear of current or ex partner: No     Emotionally abused: No     Physically abused: No     Forced sexual activity: No   Other Topics Concern    None   Social History Narrative    None       Family History     Family History   Problem Relation Age of Onset    Emphysema Mother     No Known Problems Sister     No Known Problems Daughter     No Known Problems Sister     No Known Problems Sister     No Known Problems Sister     No Known Problems Paternal Aunt     No Known Problems Maternal Aunt     No Known Problems Maternal Aunt        Current Medications       Current Outpatient Medications:     atorvastatin (LIPITOR) 10 mg tablet, Take 1 tablet (10 mg total) by mouth daily, Disp: 30 tablet, Rfl: 2    CONTOUR TEST test strip, Test blood sugars twice daily, Disp: 100 each, Rfl: 2    dicyclomine (BENTYL) 20 mg tablet, Take 1 tablet (20 mg total) by mouth every 6 (six) hours as needed (abd pain) for up to 12 doses, Disp: 12 tablet, Rfl: 0    levothyroxine 125 mcg tablet, Take 1 tablet (125 mcg total) by mouth daily, Disp: 30 tablet, Rfl: 2    metFORMIN (GLUCOPHAGE) 500 mg tablet, Take 1 tablet (500 mg total) by mouth daily, Disp: 30 tablet, Rfl: 2     Allergies     No Known Allergies    Objective     /72 (BP Location: Left arm, Patient Position: Sitting, Cuff Size: Adult)   Pulse 72   Temp (!) 96 3 °F (35 7 °C) (Tympanic)   Resp 16   Ht 5' 3 5" (1 613 m)   Wt 74 4 kg (164 lb)   SpO2 98%   BMI 28 60 kg/m²      Physical Exam   Constitutional: She is oriented to person, place, and time  She appears well-developed and well-nourished  HENT:   Head: Normocephalic  Right Ear: External ear normal    Left Ear: External ear normal    Eyes: Conjunctivae and EOM are normal  Right eye exhibits no discharge  Left eye exhibits no discharge  Neck: No JVD present  Cardiovascular: Normal rate, regular rhythm and normal heart sounds  Exam reveals no gallop  No murmur heard  Pulmonary/Chest: Effort normal  No respiratory distress  She has no wheezes  She has no rales  She exhibits no tenderness  Abdominal: She exhibits no mass  There is no tenderness  There is no rebound  Musculoskeletal: She exhibits no edema or tenderness  Neurological: She is alert and oriented to person, place, and time  Skin: No rash noted  No erythema  Psychiatric: She has a normal mood and affect  Her behavior is normal            Jacek Landrum MD  [address] [place] [address]  BMI Counseling: Body mass index is 28 6 kg/m²   The BMI is above normal  Nutrition recommendations include reducing portion sizes, decreasing overall calorie intake and 3-5 servings of fruits/vegetables daily  Exercise recommendations include moderate aerobic physical activity for 150 minutes/week

## 2019-12-11 NOTE — PATIENT INSTRUCTIONS

## 2020-01-09 ENCOUNTER — APPOINTMENT (OUTPATIENT)
Dept: LAB | Facility: HOSPITAL | Age: 60
End: 2020-01-09
Payer: COMMERCIAL

## 2020-01-09 DIAGNOSIS — E11.9 TYPE 2 DIABETES MELLITUS WITHOUT COMPLICATION, WITHOUT LONG-TERM CURRENT USE OF INSULIN (HCC): ICD-10-CM

## 2020-01-09 DIAGNOSIS — E78.2 MIXED HYPERLIPIDEMIA: ICD-10-CM

## 2020-01-09 DIAGNOSIS — E03.9 ACQUIRED HYPOTHYROIDISM: ICD-10-CM

## 2020-01-09 LAB
ANION GAP SERPL CALCULATED.3IONS-SCNC: 5 MMOL/L (ref 4–13)
BASOPHILS # BLD AUTO: 0.02 THOUSANDS/ΜL (ref 0–0.1)
BASOPHILS NFR BLD AUTO: 0 % (ref 0–1)
BUN SERPL-MCNC: 14 MG/DL (ref 5–25)
CALCIUM SERPL-MCNC: 9.2 MG/DL (ref 8.3–10.1)
CHLORIDE SERPL-SCNC: 107 MMOL/L (ref 100–108)
CHOLEST SERPL-MCNC: 155 MG/DL (ref 50–200)
CO2 SERPL-SCNC: 30 MMOL/L (ref 21–32)
CREAT SERPL-MCNC: 0.74 MG/DL (ref 0.6–1.3)
CREAT UR-MCNC: 135 MG/DL
EOSINOPHIL # BLD AUTO: 0.22 THOUSAND/ΜL (ref 0–0.61)
EOSINOPHIL NFR BLD AUTO: 3 % (ref 0–6)
ERYTHROCYTE [DISTWIDTH] IN BLOOD BY AUTOMATED COUNT: 11.9 % (ref 11.6–15.1)
EST. AVERAGE GLUCOSE BLD GHB EST-MCNC: 131 MG/DL
GFR SERPL CREATININE-BSD FRML MDRD: 88 ML/MIN/1.73SQ M
GLUCOSE P FAST SERPL-MCNC: 128 MG/DL (ref 65–99)
HBA1C MFR BLD: 6.2 % (ref 4.2–6.3)
HCT VFR BLD AUTO: 44.2 % (ref 34.8–46.1)
HDLC SERPL-MCNC: 55 MG/DL
HGB BLD-MCNC: 14.4 G/DL (ref 11.5–15.4)
IMM GRANULOCYTES # BLD AUTO: 0.01 THOUSAND/UL (ref 0–0.2)
IMM GRANULOCYTES NFR BLD AUTO: 0 % (ref 0–2)
LDLC SERPL CALC-MCNC: 81 MG/DL (ref 0–100)
LYMPHOCYTES # BLD AUTO: 1.99 THOUSANDS/ΜL (ref 0.6–4.47)
LYMPHOCYTES NFR BLD AUTO: 31 % (ref 14–44)
MCH RBC QN AUTO: 29.8 PG (ref 26.8–34.3)
MCHC RBC AUTO-ENTMCNC: 32.6 G/DL (ref 31.4–37.4)
MCV RBC AUTO: 92 FL (ref 82–98)
MICROALBUMIN UR-MCNC: <5 MG/L (ref 0–20)
MICROALBUMIN/CREAT 24H UR: <4 MG/G CREATININE (ref 0–30)
MONOCYTES # BLD AUTO: 0.36 THOUSAND/ΜL (ref 0.17–1.22)
MONOCYTES NFR BLD AUTO: 6 % (ref 4–12)
NEUTROPHILS # BLD AUTO: 3.78 THOUSANDS/ΜL (ref 1.85–7.62)
NEUTS SEG NFR BLD AUTO: 60 % (ref 43–75)
NONHDLC SERPL-MCNC: 100 MG/DL
NRBC BLD AUTO-RTO: 0 /100 WBCS
PLATELET # BLD AUTO: 220 THOUSANDS/UL (ref 149–390)
PMV BLD AUTO: 10.8 FL (ref 8.9–12.7)
POTASSIUM SERPL-SCNC: 4 MMOL/L (ref 3.5–5.3)
RBC # BLD AUTO: 4.83 MILLION/UL (ref 3.81–5.12)
SODIUM SERPL-SCNC: 142 MMOL/L (ref 136–145)
TRIGL SERPL-MCNC: 96 MG/DL
TSH SERPL DL<=0.05 MIU/L-ACNC: 0.62 UIU/ML (ref 0.36–3.74)
WBC # BLD AUTO: 6.38 THOUSAND/UL (ref 4.31–10.16)

## 2020-01-09 PROCEDURE — 80048 BASIC METABOLIC PNL TOTAL CA: CPT

## 2020-01-09 PROCEDURE — 83036 HEMOGLOBIN GLYCOSYLATED A1C: CPT

## 2020-01-09 PROCEDURE — 80061 LIPID PANEL: CPT

## 2020-01-09 PROCEDURE — 36415 COLL VENOUS BLD VENIPUNCTURE: CPT

## 2020-01-09 PROCEDURE — 84443 ASSAY THYROID STIM HORMONE: CPT

## 2020-01-09 PROCEDURE — 85025 COMPLETE CBC W/AUTO DIFF WBC: CPT

## 2020-01-09 PROCEDURE — 82043 UR ALBUMIN QUANTITATIVE: CPT | Performed by: FAMILY MEDICINE

## 2020-01-09 PROCEDURE — 82570 ASSAY OF URINE CREATININE: CPT | Performed by: FAMILY MEDICINE

## 2020-01-13 ENCOUNTER — OFFICE VISIT (OUTPATIENT)
Dept: FAMILY MEDICINE CLINIC | Facility: CLINIC | Age: 60
End: 2020-01-13
Payer: COMMERCIAL

## 2020-01-13 VITALS
TEMPERATURE: 97.8 F | BODY MASS INDEX: 28.85 KG/M2 | DIASTOLIC BLOOD PRESSURE: 70 MMHG | WEIGHT: 169 LBS | HEIGHT: 64 IN | HEART RATE: 69 BPM | SYSTOLIC BLOOD PRESSURE: 130 MMHG | OXYGEN SATURATION: 98 %

## 2020-01-13 DIAGNOSIS — I10 ESSENTIAL HYPERTENSION: ICD-10-CM

## 2020-01-13 DIAGNOSIS — E03.9 ACQUIRED HYPOTHYROIDISM: ICD-10-CM

## 2020-01-13 DIAGNOSIS — J45.20 MILD INTERMITTENT ASTHMA WITHOUT COMPLICATION: ICD-10-CM

## 2020-01-13 DIAGNOSIS — E11.9 TYPE 2 DIABETES MELLITUS WITHOUT COMPLICATION, WITHOUT LONG-TERM CURRENT USE OF INSULIN (HCC): Primary | ICD-10-CM

## 2020-01-13 PROCEDURE — 3075F SYST BP GE 130 - 139MM HG: CPT | Performed by: FAMILY MEDICINE

## 2020-01-13 PROCEDURE — 3008F BODY MASS INDEX DOCD: CPT | Performed by: FAMILY MEDICINE

## 2020-01-13 PROCEDURE — 1036F TOBACCO NON-USER: CPT | Performed by: FAMILY MEDICINE

## 2020-01-13 PROCEDURE — 3078F DIAST BP <80 MM HG: CPT | Performed by: FAMILY MEDICINE

## 2020-01-13 PROCEDURE — 99214 OFFICE O/P EST MOD 30 MIN: CPT | Performed by: FAMILY MEDICINE

## 2020-01-13 RX ORDER — ALBUTEROL SULFATE 90 UG/1
2 AEROSOL, METERED RESPIRATORY (INHALATION) EVERY 6 HOURS PRN
Qty: 1 INHALER | Refills: 0 | Status: SHIPPED | OUTPATIENT
Start: 2020-01-13 | End: 2020-02-10

## 2020-01-13 NOTE — ASSESSMENT & PLAN NOTE
Chronic asymptomatic fair control continue low-salt diet and discussed with the patient important increase physical activity and lose weight

## 2020-01-13 NOTE — ASSESSMENT & PLAN NOTE
A chronic asymptomatic fair control rarely use her albuterol inhaler no recent exacerbation no recent hospitalization asthma plan discussed with the patient

## 2020-01-13 NOTE — ASSESSMENT & PLAN NOTE
Lab Results   Component Value Date    HGBA1C 6 2 01/09/2020     Chronic asymptomatic fair control continue with the metformin 500 mg once a day encouraged patient to lose weight low carb diet discussed with the patient patient already on statin  A negative for microalbuminuria

## 2020-01-13 NOTE — PROGRESS NOTES
Subjective:   Chief Complaint   Patient presents with    Follow-up     chronic conditions        Patient ID: Montez Soria is a 60 y o  female  Patient here follow-up with a chronic condition patient was history of non-insulin-dependent diabetic on metformin 500 once a day tolerated well deny any increased thirsty increased frequency urination no dizziness no headache and no abdomen pain patient was history of the hyperlipidemia on atorvastatin tolerated well deny any chest pain short of breath no palpitation no TIA symptom patient history of hypothyroidism on levothyroxine 125 mcg tolerated well deny any heat or cold intolerance mood is stable patient history of asthma deny any cough wheezing no hematosis no dyspnea on exertion no recent exacerbation no recent hospitalization patient is not smoker rarely use her albuterol inhaler less than once a months  Recent blood work discussed with the patient      The following portions of the patient's history were reviewed and updated as appropriate: allergies, current medications, past family history, past medical history, past social history, past surgical history and problem list     Review of Systems   Constitutional: Negative for fatigue and fever  HENT: Negative for ear pain, sinus pressure, sinus pain and sore throat  Eyes: Negative for pain and redness  Respiratory: Negative for cough, chest tightness and shortness of breath  Cardiovascular: Negative for chest pain, palpitations and leg swelling  Gastrointestinal: Negative for abdominal pain, blood in stool, constipation, diarrhea and nausea  Genitourinary: Negative for flank pain, frequency and hematuria  Musculoskeletal: Negative for back pain and joint swelling  Skin: Negative for rash  Neurological: Negative for dizziness, numbness and headaches  Hematological: Does not bruise/bleed easily               Objective:  Vitals:    01/13/20 0942   BP: 130/70   Pulse: 69   Temp: 97 8 °F (36 6 °C)   TempSrc: Tympanic   SpO2: 98%   Weight: 76 7 kg (169 lb)   Height: 5' 4" (1 626 m)      Physical Exam   Constitutional: She is oriented to person, place, and time  She appears well-developed and well-nourished  HENT:   Head: Normocephalic  Right Ear: External ear normal    Left Ear: External ear normal    Eyes: Conjunctivae and EOM are normal  Right eye exhibits no discharge  Left eye exhibits no discharge  Neck: No JVD present  Cardiovascular: Normal rate, regular rhythm and normal heart sounds  Exam reveals no gallop  Pulses are no weak pulses  No murmur heard  Pulses:       Dorsalis pedis pulses are 2+ on the right side, and 2+ on the left side  Pulmonary/Chest: Effort normal  No respiratory distress  She has no wheezes  She has no rales  She exhibits no tenderness  Abdominal: She exhibits no mass  There is no tenderness  There is no rebound  Musculoskeletal: She exhibits no edema or tenderness  Feet:   Right Foot:   Skin Integrity: Negative for warmth  Left Foot:   Skin Integrity: Negative for warmth  Neurological: She is alert and oriented to person, place, and time  Skin: No rash noted  No erythema  Patient's shoes and socks removed  Right Foot/Ankle   Right Foot Inspection  Skin Exam: skin intact no warmth and no pre-ulcer                          Toe Exam: no swelling and erythema  Sensory       Monofilament testing: intact  Vascular  Capillary refills: < 3 seconds  The right DP pulse is 2+  Left Foot/Ankle  Left Foot Inspection  Skin Exam: skin intactno warmth and no pre-ulcer                         Toe Exam: no swelling and no erythema                   Sensory       Monofilament: intact  Vascular  Capillary refills: < 3 seconds  The left DP pulse is 2+  Assign Risk Category:  No deformity present; No loss of protective sensation;  No weak pulses       Risk: 0    Assessment/Plan:    Type 2 diabetes mellitus without complication, without long-term current use of insulin ([facility] )    Lab Results   Component Value Date    HGBA1C 6 2 01/09/2020     Chronic asymptomatic fair control continue with the metformin 500 mg once a day encouraged patient to lose weight low carb diet discussed with the patient patient already on statin  A negative for microalbuminuria    Hypothyroidism  Chronic asymptomatic fair control continue with the current dose of levothyroxine 125 mcg once a day proper use of medication discussed with the patient    Essential hypertension  Chronic asymptomatic fair control continue low-salt diet and discussed with the patient important increase physical activity and lose weight    Mild intermittent asthma without complication  A chronic asymptomatic fair control rarely use her albuterol inhaler no recent exacerbation no recent hospitalization asthma plan discussed with the patient       Diagnoses and all orders for this visit:    Type 2 diabetes mellitus without complication, without long-term current use of insulin ([facility])  -     CBC and differential; Future  -     Basic metabolic panel; Future  -     Hemoglobin A1C; Future    Acquired hypothyroidism  -     CBC and differential; Future  -     Basic metabolic panel; Future  -     Hemoglobin A1C; Future    Essential hypertension  -     CBC and differential; Future  -     Basic metabolic panel; Future  -     Hemoglobin A1C; Future    Mild intermittent asthma without complication  -     albuterol (PROVENTIL HFA,VENTOLIN HFA) 90 mcg/act inhaler;  Inhale 2 puffs every 6 (six) hours as needed for wheezing or shortness of breath

## 2020-01-13 NOTE — ASSESSMENT & PLAN NOTE
Chronic asymptomatic fair control continue with the current dose of levothyroxine 125 mcg once a day proper use of medication discussed with the patient

## 2020-02-09 DIAGNOSIS — J45.20 MILD INTERMITTENT ASTHMA WITHOUT COMPLICATION: ICD-10-CM

## 2020-02-10 RX ORDER — ALBUTEROL SULFATE 90 UG/1
AEROSOL, METERED RESPIRATORY (INHALATION)
Qty: 1 INHALER | Refills: 0 | Status: SHIPPED | OUTPATIENT
Start: 2020-02-10 | End: 2020-05-18 | Stop reason: ALTCHOICE

## 2020-02-26 DIAGNOSIS — E78.2 MIXED HYPERLIPIDEMIA: ICD-10-CM

## 2020-02-26 DIAGNOSIS — E03.9 ACQUIRED HYPOTHYROIDISM: ICD-10-CM

## 2020-02-26 DIAGNOSIS — E11.9 TYPE 2 DIABETES MELLITUS WITHOUT COMPLICATION, WITHOUT LONG-TERM CURRENT USE OF INSULIN (HCC): ICD-10-CM

## 2020-02-26 RX ORDER — ATORVASTATIN CALCIUM 10 MG/1
10 TABLET, FILM COATED ORAL DAILY
Qty: 30 TABLET | Refills: 2 | Status: SHIPPED | OUTPATIENT
Start: 2020-02-26 | End: 2020-06-09

## 2020-02-26 RX ORDER — LEVOTHYROXINE SODIUM 0.12 MG/1
125 TABLET ORAL DAILY
Qty: 30 TABLET | Refills: 2 | Status: SHIPPED | OUTPATIENT
Start: 2020-02-26 | End: 2020-06-01

## 2020-03-06 ENCOUNTER — OFFICE VISIT (OUTPATIENT)
Dept: FAMILY MEDICINE CLINIC | Facility: CLINIC | Age: 60
End: 2020-03-06
Payer: COMMERCIAL

## 2020-03-06 VITALS
HEART RATE: 69 BPM | OXYGEN SATURATION: 98 % | SYSTOLIC BLOOD PRESSURE: 120 MMHG | DIASTOLIC BLOOD PRESSURE: 70 MMHG | WEIGHT: 167 LBS | BODY MASS INDEX: 28.51 KG/M2 | HEIGHT: 64 IN | TEMPERATURE: 98.4 F

## 2020-03-06 DIAGNOSIS — B02.9 HERPES ZOSTER WITHOUT COMPLICATION: Primary | ICD-10-CM

## 2020-03-06 PROCEDURE — 3074F SYST BP LT 130 MM HG: CPT | Performed by: FAMILY MEDICINE

## 2020-03-06 PROCEDURE — 3044F HG A1C LEVEL LT 7.0%: CPT | Performed by: FAMILY MEDICINE

## 2020-03-06 PROCEDURE — 3008F BODY MASS INDEX DOCD: CPT | Performed by: FAMILY MEDICINE

## 2020-03-06 PROCEDURE — 2022F DILAT RTA XM EVC RTNOPTHY: CPT | Performed by: FAMILY MEDICINE

## 2020-03-06 PROCEDURE — 99214 OFFICE O/P EST MOD 30 MIN: CPT | Performed by: FAMILY MEDICINE

## 2020-03-06 PROCEDURE — 1036F TOBACCO NON-USER: CPT | Performed by: FAMILY MEDICINE

## 2020-03-06 PROCEDURE — 3078F DIAST BP <80 MM HG: CPT | Performed by: FAMILY MEDICINE

## 2020-03-06 RX ORDER — FAMCICLOVIR 250 MG/1
250 TABLET, FILM COATED ORAL 3 TIMES DAILY
Qty: 21 TABLET | Refills: 0 | Status: SHIPPED | OUTPATIENT
Start: 2020-03-06 | End: 2020-04-13 | Stop reason: SDUPTHER

## 2020-03-06 NOTE — PATIENT INSTRUCTIONS
Culebrilla   CUIDADO AMBULATORIO:   Culebrilla  es un sarpullido doloroso  La culebrilla es causada por el mismo virus que causa la varicela (virus varicela-zóster)  Después de contagiarse con varicela, el virus permanece en desai cuerpo por varios años sin causar ningún síntoma  La culebrilla ocurre cuando el virus se activa nuevamente  Ronda Jose que se activa, el virus viaja por un nervio hasta desai piel y provoca un sarpullido  Los signos y síntomas más comunes incluyen los siguientes:  La culebrilla usualmente comienza con dolor en la espalda, pecho, amaris o garett  Luego, un sarpullido se desarrolla en la misma área  El sarpullido usualmente se encuentra en un lado de desai cuerpo solamente  Usted podría sentir picazón o dolor donde se presenta el sarpullido  El sarpullido comienza adele puntos rojos que se convierten en ampollas llenas de líquido  [employer] crecen, se llenan de pus y eleni costras después de varios días  Usted también podría presentar alguno de los siguientes:  · Cansancio y debilidad muscular    · Dolor cuando toca desai piel ligeramente    · Dolor de anju    · Lorrain Peel    · Dolor en los ojos cuando son expuestos a la liban  Busque atención médica de inmediato si:   · La piel alrededor de las ampollas está adolorida, enrojecida y caliente o las ampollas drenan pus  · Usted tiene rigidez en el amaris o dificultad para moverlo  · Usted tiene dificultad para  yvonne brazos, piernas o garett  · Usted sufre mega convulsión  · Usted tiene debilidad en un brazo o en mega pierna  · Usted se siente confundido o tiene dificultad para hablar  · Usted se siente mareado, tiene dolor de [place] severo o pérdida de audición o visión  Pregúntele a desai Kailyn Juan vitaminas y minerales son adecuados para usted  · Usted se siente débil o tiene dolor de [place]  · Usted tiene tos, escalofríos o fiebre      · Usted tiene dolor abdominal o náuseas o está vomitando  · El sarpullido le causa más picazón o dolor  · El sarpullido se propaga a otras partes de desai cuerpo  · Desai dolor empeora y no desaparece aún después de bhavani medicamento  · Usted tiene preguntas o inquietudes acerca de desai condición o cuidado  Medicamentos:   · Medicamentos antivirales  ayudan a reducir los síntomas y el tiempo de recuperación  También podrían reducir desai riesgo de dolor de [place]  Para prevenir el dolor de nervio, es necesario bhavani danny medicamento dentro de los primeros 3 caitlin del inicio de los síntomas  · Los analgésicos  pueden ser recetados o sugeridos por desai médico  Dependiendo de la severidad de desai dolor, usted podría necesitar medicamentos antiinflamatorios para el dolor, acetaminofén u opiáceos  No espere a que desai dolor sea severo para bhavani más medicamentos para el dolor  · Los anestésicos tópicos  se usan para entumecer la piel y reducir el dolor  Son disponibles en forma de crema, gel, aerosol o un parche  · Los anticonvulsivos  reducen el dolor de nervio y podrían facilitar desai habilidad para dormir  · Antidepresivos  se usan para reducir el dolor de nervio  Acuda a yvonne consultas de control con desai médico según le indicaron  Anote yvonne preguntas para que se acuerde de hacerlas alayna yvonne visitas  Cuidados personales:  Mantenga el sarpullido limpio y [address] sarpullido con un vendaje o ropa  No utilice vendajes que se pegan a la piel  La parte pegajosa podría irritar la piel y prolongar el sarpullido  Prevenga la propagación de la culebrilla:  Es posible transmitir el virus a mega persona que nunca ha tenido varicela  Mega vez expuesto al virus, esta persona puede contraer la varicela, katherin no culebrilla  Es posible transmitir el virus a otras personas mientras tiene el sarpullido  El virus se transmite por contacto directo con el líquido de las [place]  Por lo general, no es posible transmitir el virus mega vez que se sequen las ampollas  Prevenga la culebrilla u otro brote de culebrilla:  Es posible que se administre mega vacuna para facilitar la prevención de la culebrilla  Pida más información acerca de esta vacuna  © 2017 [facility] Information is for End User's use only and may not be sold, redistributed or otherwise used for commercial purposes  All illustrations and images included in CareNotes® are the copyrighted property of A D A M , Inc  or Ralph Judge  Esta información es sólo para uso en educación  Desai intención no es darle un consejo médico sobre enfermedades o tratamientos  Colsulte con desai Jerel Chill farmacéutico antes de seguir cualquier régimen médico para saber si es seguro y efectivo para usted

## 2020-03-06 NOTE — ASSESSMENT & PLAN NOTE
New diagnosis symptomatic start famciclovir 250 3 times a day for 7 days proper use of medication possible side effect discussed with the patient discussed the patient avoid skin to skin contact avoid coming close to baby pregnant woman

## 2020-03-06 NOTE — PROGRESS NOTES
Subjective:   Chief Complaint   Patient presents with    Rash     back of r leg        Patient ID: Karina Borrego is a 60 y o  female  Patient concerned about rash on her leg right leg started 2 days ago start as a to small the rash and now spread the line up to her buttock area her dad multiple vessel painful not itchy and red deny any upper story symptom she has only 1 who had it in the family and this the 1st time she had similar rash no muscle pain no fever no decrease in oral intake      The following portions of the patient's history were reviewed and updated as appropriate: allergies, current medications, past family history, past medical history, past social history, past surgical history and problem list     Review of Systems   Constitutional: Negative for fatigue and fever  HENT: Negative for ear pain, sinus pressure, sinus pain and sore throat  Eyes: Negative for pain and redness  Respiratory: Negative for cough, chest tightness and shortness of breath  Cardiovascular: Negative for chest pain, palpitations and leg swelling  Gastrointestinal: Negative for abdominal pain, blood in stool, constipation, diarrhea and nausea  Genitourinary: Negative for flank pain, frequency and hematuria  Musculoskeletal: Negative for back pain and joint swelling  Skin: Positive for rash  Neurological: Negative for dizziness, numbness and headaches  Hematological: Does not bruise/bleed easily  Objective:  Vitals:    03/06/20 1103   BP: 120/70   Pulse: 69   Temp: 98 4 °F (36 9 °C)   TempSrc: Tympanic   SpO2: 98%   Weight: 75 8 kg (167 lb)   Height: 5' 3 5" (1 613 m)      Physical Exam   Constitutional: She is oriented to person, place, and time  She appears well-developed and well-nourished  HENT:   Head: Normocephalic  Right Ear: External ear normal    Left Ear: External ear normal    Eyes: Conjunctivae and EOM are normal  Right eye exhibits no discharge   Left eye exhibits no discharge  Neck: No JVD present  Cardiovascular: Normal rate, regular rhythm and normal heart sounds  Exam reveals no gallop  No murmur heard  Pulmonary/Chest: Effort normal  No respiratory distress  She has no wheezes  She has no rales  She exhibits no tenderness  Abdominal: She exhibits no mass  There is no tenderness  There is no rebound  Musculoskeletal: She exhibits no edema or tenderness  Neurological: She is alert and oriented to person, place, and time  Skin: Rash noted  There is erythema  Vesicles on erythematousbase the liner distribution on the nerve pathway L 4 a group of vesicles with different age and non blanching         Assessment/Plan:    Herpes zoster without complication  New diagnosis symptomatic start famciclovir 250 3 times a day for 7 days proper use of medication possible side effect discussed with the patient discussed the patient avoid skin to skin contact avoid coming close to baby pregnant woman       Diagnoses and all orders for this visit:    Herpes zoster without complication  -     famciclovir (FAMVIR) 250 MG tablet;  Take 1 tablet (250 mg total) by mouth 3 (three) times a day for 7 days

## 2020-04-01 DIAGNOSIS — E11.9 TYPE 2 DIABETES MELLITUS WITHOUT COMPLICATION, WITHOUT LONG-TERM CURRENT USE OF INSULIN (HCC): Primary | ICD-10-CM

## 2020-04-01 RX ORDER — LANCETS
EACH MISCELLANEOUS
Qty: 100 EACH | Refills: 2 | Status: SHIPPED | OUTPATIENT
Start: 2020-04-01

## 2020-04-11 ENCOUNTER — APPOINTMENT (OUTPATIENT)
Dept: LAB | Facility: HOSPITAL | Age: 60
End: 2020-04-11
Payer: COMMERCIAL

## 2020-04-11 DIAGNOSIS — E03.9 ACQUIRED HYPOTHYROIDISM: ICD-10-CM

## 2020-04-11 DIAGNOSIS — E11.9 TYPE 2 DIABETES MELLITUS WITHOUT COMPLICATION, WITHOUT LONG-TERM CURRENT USE OF INSULIN (HCC): ICD-10-CM

## 2020-04-11 DIAGNOSIS — I10 ESSENTIAL HYPERTENSION: ICD-10-CM

## 2020-04-11 LAB
ANION GAP SERPL CALCULATED.3IONS-SCNC: 4 MMOL/L (ref 4–13)
BASOPHILS # BLD AUTO: 0.01 THOUSANDS/ΜL (ref 0–0.1)
BASOPHILS NFR BLD AUTO: 0 % (ref 0–1)
BUN SERPL-MCNC: 11 MG/DL (ref 5–25)
CALCIUM SERPL-MCNC: 9.3 MG/DL (ref 8.3–10.1)
CHLORIDE SERPL-SCNC: 105 MMOL/L (ref 100–108)
CO2 SERPL-SCNC: 32 MMOL/L (ref 21–32)
CREAT SERPL-MCNC: 0.83 MG/DL (ref 0.6–1.3)
EOSINOPHIL # BLD AUTO: 0.18 THOUSAND/ΜL (ref 0–0.61)
EOSINOPHIL NFR BLD AUTO: 4 % (ref 0–6)
ERYTHROCYTE [DISTWIDTH] IN BLOOD BY AUTOMATED COUNT: 12 % (ref 11.6–15.1)
EST. AVERAGE GLUCOSE BLD GHB EST-MCNC: 134 MG/DL
GFR SERPL CREATININE-BSD FRML MDRD: 76 ML/MIN/1.73SQ M
GLUCOSE P FAST SERPL-MCNC: 127 MG/DL (ref 65–99)
HBA1C MFR BLD: 6.3 %
HCT VFR BLD AUTO: 42.3 % (ref 34.8–46.1)
HGB BLD-MCNC: 14 G/DL (ref 11.5–15.4)
IMM GRANULOCYTES # BLD AUTO: 0.01 THOUSAND/UL (ref 0–0.2)
IMM GRANULOCYTES NFR BLD AUTO: 0 % (ref 0–2)
LYMPHOCYTES # BLD AUTO: 1.86 THOUSANDS/ΜL (ref 0.6–4.47)
LYMPHOCYTES NFR BLD AUTO: 38 % (ref 14–44)
MCH RBC QN AUTO: 29.7 PG (ref 26.8–34.3)
MCHC RBC AUTO-ENTMCNC: 33.1 G/DL (ref 31.4–37.4)
MCV RBC AUTO: 90 FL (ref 82–98)
MONOCYTES # BLD AUTO: 0.42 THOUSAND/ΜL (ref 0.17–1.22)
MONOCYTES NFR BLD AUTO: 9 % (ref 4–12)
NEUTROPHILS # BLD AUTO: 2.46 THOUSANDS/ΜL (ref 1.85–7.62)
NEUTS SEG NFR BLD AUTO: 49 % (ref 43–75)
NRBC BLD AUTO-RTO: 0 /100 WBCS
PLATELET # BLD AUTO: 204 THOUSANDS/UL (ref 149–390)
PMV BLD AUTO: 11.3 FL (ref 8.9–12.7)
POTASSIUM SERPL-SCNC: 4.3 MMOL/L (ref 3.5–5.3)
RBC # BLD AUTO: 4.71 MILLION/UL (ref 3.81–5.12)
SODIUM SERPL-SCNC: 141 MMOL/L (ref 136–145)
WBC # BLD AUTO: 4.94 THOUSAND/UL (ref 4.31–10.16)

## 2020-04-11 PROCEDURE — 36415 COLL VENOUS BLD VENIPUNCTURE: CPT

## 2020-04-11 PROCEDURE — 80048 BASIC METABOLIC PNL TOTAL CA: CPT

## 2020-04-11 PROCEDURE — 83036 HEMOGLOBIN GLYCOSYLATED A1C: CPT

## 2020-04-11 PROCEDURE — 85025 COMPLETE CBC W/AUTO DIFF WBC: CPT

## 2020-04-13 ENCOUNTER — OFFICE VISIT (OUTPATIENT)
Dept: FAMILY MEDICINE CLINIC | Facility: CLINIC | Age: 60
End: 2020-04-13
Payer: COMMERCIAL

## 2020-04-13 VITALS
DIASTOLIC BLOOD PRESSURE: 70 MMHG | TEMPERATURE: 98.3 F | SYSTOLIC BLOOD PRESSURE: 110 MMHG | HEART RATE: 66 BPM | WEIGHT: 165 LBS | OXYGEN SATURATION: 97 % | HEIGHT: 63 IN | BODY MASS INDEX: 29.23 KG/M2

## 2020-04-13 DIAGNOSIS — E55.9 VITAMIN D DEFICIENCY: ICD-10-CM

## 2020-04-13 DIAGNOSIS — E03.9 ACQUIRED HYPOTHYROIDISM: Primary | ICD-10-CM

## 2020-04-13 DIAGNOSIS — E78.2 MIXED HYPERLIPIDEMIA: ICD-10-CM

## 2020-04-13 DIAGNOSIS — E11.9 TYPE 2 DIABETES MELLITUS WITHOUT COMPLICATION, WITHOUT LONG-TERM CURRENT USE OF INSULIN (HCC): ICD-10-CM

## 2020-04-13 DIAGNOSIS — I10 ESSENTIAL HYPERTENSION: ICD-10-CM

## 2020-04-13 PROCEDURE — 99214 OFFICE O/P EST MOD 30 MIN: CPT | Performed by: FAMILY MEDICINE

## 2020-04-13 PROCEDURE — 3044F HG A1C LEVEL LT 7.0%: CPT | Performed by: FAMILY MEDICINE

## 2020-04-13 PROCEDURE — 3078F DIAST BP <80 MM HG: CPT | Performed by: FAMILY MEDICINE

## 2020-04-13 PROCEDURE — 2022F DILAT RTA XM EVC RTNOPTHY: CPT | Performed by: FAMILY MEDICINE

## 2020-04-13 PROCEDURE — 1036F TOBACCO NON-USER: CPT | Performed by: FAMILY MEDICINE

## 2020-04-13 PROCEDURE — 3074F SYST BP LT 130 MM HG: CPT | Performed by: FAMILY MEDICINE

## 2020-05-12 DIAGNOSIS — E11.9 TYPE 2 DIABETES MELLITUS WITHOUT COMPLICATION, WITHOUT LONG-TERM CURRENT USE OF INSULIN (HCC): Primary | ICD-10-CM

## 2020-05-15 DIAGNOSIS — E11.9 TYPE 2 DIABETES MELLITUS WITHOUT COMPLICATION, WITHOUT LONG-TERM CURRENT USE OF INSULIN (HCC): ICD-10-CM

## 2020-05-18 ENCOUNTER — OFFICE VISIT (OUTPATIENT)
Dept: FAMILY MEDICINE CLINIC | Facility: CLINIC | Age: 60
End: 2020-05-18
Payer: COMMERCIAL

## 2020-05-18 VITALS
WEIGHT: 168 LBS | TEMPERATURE: 97.4 F | HEART RATE: 65 BPM | DIASTOLIC BLOOD PRESSURE: 70 MMHG | BODY MASS INDEX: 28.68 KG/M2 | HEIGHT: 64 IN | SYSTOLIC BLOOD PRESSURE: 134 MMHG | OXYGEN SATURATION: 98 %

## 2020-05-18 DIAGNOSIS — R42 VERTIGO: Primary | ICD-10-CM

## 2020-05-18 PROCEDURE — 3044F HG A1C LEVEL LT 7.0%: CPT | Performed by: FAMILY MEDICINE

## 2020-05-18 PROCEDURE — 3008F BODY MASS INDEX DOCD: CPT | Performed by: FAMILY MEDICINE

## 2020-05-18 PROCEDURE — 3078F DIAST BP <80 MM HG: CPT | Performed by: FAMILY MEDICINE

## 2020-05-18 PROCEDURE — 3075F SYST BP GE 130 - 139MM HG: CPT | Performed by: FAMILY MEDICINE

## 2020-05-18 PROCEDURE — 1036F TOBACCO NON-USER: CPT | Performed by: FAMILY MEDICINE

## 2020-05-18 PROCEDURE — 99214 OFFICE O/P EST MOD 30 MIN: CPT | Performed by: FAMILY MEDICINE

## 2020-05-18 PROCEDURE — 2022F DILAT RTA XM EVC RTNOPTHY: CPT | Performed by: FAMILY MEDICINE

## 2020-05-18 RX ORDER — MECLIZINE HYDROCHLORIDE 25 MG/1
25 TABLET ORAL DAILY
Qty: 30 TABLET | Refills: 0 | Status: SHIPPED | OUTPATIENT
Start: 2020-05-18 | End: 2020-09-15

## 2020-06-01 DIAGNOSIS — E03.9 ACQUIRED HYPOTHYROIDISM: ICD-10-CM

## 2020-06-01 RX ORDER — LEVOTHYROXINE SODIUM 0.12 MG/1
TABLET ORAL
Qty: 30 TABLET | Refills: 2 | Status: SHIPPED | OUTPATIENT
Start: 2020-06-01 | End: 2020-07-13 | Stop reason: DRUGHIGH

## 2020-06-09 DIAGNOSIS — E78.2 MIXED HYPERLIPIDEMIA: ICD-10-CM

## 2020-06-09 RX ORDER — ATORVASTATIN CALCIUM 10 MG/1
TABLET, FILM COATED ORAL
Qty: 30 TABLET | Refills: 2 | Status: SHIPPED | OUTPATIENT
Start: 2020-06-09 | End: 2020-09-02

## 2020-07-10 ENCOUNTER — APPOINTMENT (OUTPATIENT)
Dept: LAB | Facility: HOSPITAL | Age: 60
End: 2020-07-10
Payer: COMMERCIAL

## 2020-07-10 DIAGNOSIS — E11.9 TYPE 2 DIABETES MELLITUS WITHOUT COMPLICATION, WITHOUT LONG-TERM CURRENT USE OF INSULIN (HCC): ICD-10-CM

## 2020-07-10 DIAGNOSIS — E55.9 VITAMIN D DEFICIENCY: ICD-10-CM

## 2020-07-10 DIAGNOSIS — E03.9 ACQUIRED HYPOTHYROIDISM: ICD-10-CM

## 2020-07-10 DIAGNOSIS — I10 ESSENTIAL HYPERTENSION: ICD-10-CM

## 2020-07-10 DIAGNOSIS — E78.2 MIXED HYPERLIPIDEMIA: ICD-10-CM

## 2020-07-10 LAB
ANION GAP SERPL CALCULATED.3IONS-SCNC: 8 MMOL/L (ref 4–13)
BASOPHILS # BLD AUTO: 0.02 THOUSANDS/ΜL (ref 0–0.1)
BASOPHILS NFR BLD AUTO: 0 % (ref 0–1)
BUN SERPL-MCNC: 15 MG/DL (ref 5–25)
CALCIUM SERPL-MCNC: 9.1 MG/DL (ref 8.3–10.1)
CHLORIDE SERPL-SCNC: 105 MMOL/L (ref 100–108)
CHOLEST SERPL-MCNC: 157 MG/DL (ref 50–200)
CO2 SERPL-SCNC: 28 MMOL/L (ref 21–32)
CREAT SERPL-MCNC: 0.79 MG/DL (ref 0.6–1.3)
EOSINOPHIL # BLD AUTO: 0.22 THOUSAND/ΜL (ref 0–0.61)
EOSINOPHIL NFR BLD AUTO: 4 % (ref 0–6)
ERYTHROCYTE [DISTWIDTH] IN BLOOD BY AUTOMATED COUNT: 11.8 % (ref 11.6–15.1)
EST. AVERAGE GLUCOSE BLD GHB EST-MCNC: 128 MG/DL
GFR SERPL CREATININE-BSD FRML MDRD: 81 ML/MIN/1.73SQ M
GLUCOSE P FAST SERPL-MCNC: 115 MG/DL (ref 65–99)
HBA1C MFR BLD: 6.1 %
HCT VFR BLD AUTO: 42.1 % (ref 34.8–46.1)
HDLC SERPL-MCNC: 46 MG/DL
HGB BLD-MCNC: 14 G/DL (ref 11.5–15.4)
IMM GRANULOCYTES # BLD AUTO: 0.01 THOUSAND/UL (ref 0–0.2)
IMM GRANULOCYTES NFR BLD AUTO: 0 % (ref 0–2)
LDLC SERPL CALC-MCNC: 89 MG/DL (ref 0–100)
LYMPHOCYTES # BLD AUTO: 2.2 THOUSANDS/ΜL (ref 0.6–4.47)
LYMPHOCYTES NFR BLD AUTO: 40 % (ref 14–44)
MCH RBC QN AUTO: 29.7 PG (ref 26.8–34.3)
MCHC RBC AUTO-ENTMCNC: 33.3 G/DL (ref 31.4–37.4)
MCV RBC AUTO: 89 FL (ref 82–98)
MONOCYTES # BLD AUTO: 0.37 THOUSAND/ΜL (ref 0.17–1.22)
MONOCYTES NFR BLD AUTO: 7 % (ref 4–12)
NEUTROPHILS # BLD AUTO: 2.69 THOUSANDS/ΜL (ref 1.85–7.62)
NEUTS SEG NFR BLD AUTO: 49 % (ref 43–75)
NRBC BLD AUTO-RTO: 0 /100 WBCS
PLATELET # BLD AUTO: 211 THOUSANDS/UL (ref 149–390)
PMV BLD AUTO: 11.1 FL (ref 8.9–12.7)
POTASSIUM SERPL-SCNC: 3.8 MMOL/L (ref 3.5–5.3)
RBC # BLD AUTO: 4.72 MILLION/UL (ref 3.81–5.12)
SODIUM SERPL-SCNC: 141 MMOL/L (ref 136–145)
T4 FREE SERPL-MCNC: 1.69 NG/DL (ref 0.76–1.46)
TRIGL SERPL-MCNC: 112 MG/DL
TSH SERPL DL<=0.05 MIU/L-ACNC: 0.19 UIU/ML (ref 0.36–3.74)
WBC # BLD AUTO: 5.51 THOUSAND/UL (ref 4.31–10.16)

## 2020-07-10 PROCEDURE — 80048 BASIC METABOLIC PNL TOTAL CA: CPT

## 2020-07-10 PROCEDURE — 83036 HEMOGLOBIN GLYCOSYLATED A1C: CPT

## 2020-07-10 PROCEDURE — 3044F HG A1C LEVEL LT 7.0%: CPT | Performed by: FAMILY MEDICINE

## 2020-07-10 PROCEDURE — 84443 ASSAY THYROID STIM HORMONE: CPT

## 2020-07-10 PROCEDURE — 36415 COLL VENOUS BLD VENIPUNCTURE: CPT

## 2020-07-10 PROCEDURE — 85025 COMPLETE CBC W/AUTO DIFF WBC: CPT

## 2020-07-10 PROCEDURE — 84439 ASSAY OF FREE THYROXINE: CPT

## 2020-07-10 PROCEDURE — 80061 LIPID PANEL: CPT

## 2020-07-13 ENCOUNTER — OFFICE VISIT (OUTPATIENT)
Dept: FAMILY MEDICINE CLINIC | Facility: CLINIC | Age: 60
End: 2020-07-13
Payer: COMMERCIAL

## 2020-07-13 VITALS
BODY MASS INDEX: 28.34 KG/M2 | HEIGHT: 64 IN | DIASTOLIC BLOOD PRESSURE: 70 MMHG | HEART RATE: 70 BPM | TEMPERATURE: 97.4 F | SYSTOLIC BLOOD PRESSURE: 120 MMHG | WEIGHT: 166 LBS | OXYGEN SATURATION: 97 %

## 2020-07-13 DIAGNOSIS — Z12.31 SCREENING MAMMOGRAM, ENCOUNTER FOR: ICD-10-CM

## 2020-07-13 DIAGNOSIS — E78.2 MIXED HYPERLIPIDEMIA: ICD-10-CM

## 2020-07-13 DIAGNOSIS — E11.9 TYPE 2 DIABETES MELLITUS WITHOUT COMPLICATION, WITHOUT LONG-TERM CURRENT USE OF INSULIN (HCC): ICD-10-CM

## 2020-07-13 DIAGNOSIS — E55.9 VITAMIN D DEFICIENCY: ICD-10-CM

## 2020-07-13 DIAGNOSIS — I10 ESSENTIAL HYPERTENSION: ICD-10-CM

## 2020-07-13 DIAGNOSIS — E03.9 ACQUIRED HYPOTHYROIDISM: Primary | ICD-10-CM

## 2020-07-13 PROCEDURE — 1036F TOBACCO NON-USER: CPT | Performed by: FAMILY MEDICINE

## 2020-07-13 PROCEDURE — 3008F BODY MASS INDEX DOCD: CPT | Performed by: FAMILY MEDICINE

## 2020-07-13 PROCEDURE — 3078F DIAST BP <80 MM HG: CPT | Performed by: FAMILY MEDICINE

## 2020-07-13 PROCEDURE — 2022F DILAT RTA XM EVC RTNOPTHY: CPT | Performed by: FAMILY MEDICINE

## 2020-07-13 PROCEDURE — 3044F HG A1C LEVEL LT 7.0%: CPT | Performed by: FAMILY MEDICINE

## 2020-07-13 PROCEDURE — 3074F SYST BP LT 130 MM HG: CPT | Performed by: FAMILY MEDICINE

## 2020-07-13 PROCEDURE — 99214 OFFICE O/P EST MOD 30 MIN: CPT | Performed by: FAMILY MEDICINE

## 2020-07-13 RX ORDER — LEVOTHYROXINE SODIUM 112 UG/1
112 TABLET ORAL
Qty: 30 TABLET | Refills: 5 | Status: SHIPPED | OUTPATIENT
Start: 2020-07-13 | End: 2020-12-17

## 2020-07-13 NOTE — ASSESSMENT & PLAN NOTE
Lab Results   Component Value Date    HGBA1C 6 1 (H) 07/10/2020     Chronic asymptomatic fair control continue current management metformin 500 mg daily  Encouraged patient to continue with the low carb diet increase the physical activity and important lose weight patient already on statin and she is negative for microalbuminuria

## 2020-07-13 NOTE — ASSESSMENT & PLAN NOTE
Chronic asymptomatic LDL close to therapeutic continue current management of atorvastatin recommend the low-fat diet and important lose weight

## 2020-07-13 NOTE — ASSESSMENT & PLAN NOTE
Chronic asymptomatic over treated will decrease the levothyroxine to 112 mcg once a day proper use and possible side effect discussed with the patient

## 2020-07-13 NOTE — PROGRESS NOTES
Subjective:   Chief Complaint   Patient presents with    Follow-up     chronic conditions        Patient ID: Amarilis Espino is a 63 y o  female  Patient here follow-up with a chronic condition patient history of non-insulin-dependent diabetic she is on metformin 500 mg once a day tolerated well without any side effect deny increased thirsty increased frequency urination no dizziness no headache and no abdomen pain  Patient history of hyperlipidemia on atorvastatin 10 mg once a day tolerated well without any rash or muscle pain and patient asymptomatic deny any chest pain short of breath no palpitation no TIA symptom patient history of hypothyroidism on levothyroxine tolerated well deny any heat or cold intolerance mood is stable no hair or skin problem  Recent blood work discussed with the patient      The following portions of the patient's history were reviewed and updated as appropriate: allergies, current medications, past family history, past medical history, past social history, past surgical history and problem list     Review of Systems   Constitutional: Negative for activity change, appetite change, fatigue and fever  HENT: Negative for congestion, ear pain, sinus pressure, sinus pain and sore throat  Eyes: Negative for pain, discharge, redness and itching  Respiratory: Negative for cough, chest tightness, shortness of breath and stridor  Cardiovascular: Negative for chest pain, palpitations and leg swelling  Gastrointestinal: Negative for abdominal pain, blood in stool, constipation, diarrhea and nausea  Genitourinary: Negative for dysuria, flank pain, frequency and hematuria  Musculoskeletal: Negative for back pain, joint swelling and neck pain  Skin: Negative for pallor and rash  Neurological: Negative for dizziness, tremors, weakness, numbness and headaches  Hematological: Does not bruise/bleed easily               Objective:  Vitals:    07/13/20 1451   BP: 120/70 Pulse: 70   Temp: (!) 97 4 °F (36 3 °C)   TempSrc: Tympanic   SpO2: 97%   Weight: 75 3 kg (166 lb)   Height: 5' 4" (1 626 m)      Physical Exam   Constitutional: She is oriented to person, place, and time  She appears well-developed and well-nourished  No distress  HENT:   Head: Normocephalic  Right Ear: External ear normal    Left Ear: External ear normal    Eyes: Conjunctivae and EOM are normal  Right eye exhibits no discharge  Left eye exhibits no discharge  Neck: Normal range of motion  Neck supple  No JVD present  Cardiovascular: Normal rate, regular rhythm and normal heart sounds  Exam reveals no gallop  No murmur heard  Pulmonary/Chest: Effort normal and breath sounds normal  No stridor  No respiratory distress  She has no wheezes  She has no rales  She exhibits no tenderness  Abdominal: Soft  She exhibits no distension and no mass  There is no tenderness  There is no rebound  Musculoskeletal: She exhibits no edema or tenderness  Lymphadenopathy:     She has no cervical adenopathy  Neurological: She is alert and oriented to person, place, and time  Skin: Skin is warm  No rash noted  She is not diaphoretic  No erythema           Assessment/Plan:    Type 2 diabetes mellitus without complication, without long-term current use of insulin (Formerly [facility])    Lab Results   Component Value Date    HGBA1C 6 1 (H) 07/10/2020     Chronic asymptomatic fair control continue current management metformin 500 mg daily  Encouraged patient to continue with the low carb diet increase the physical activity and important lose weight patient already on statin and she is negative for microalbuminuria    Hypothyroidism  Chronic asymptomatic over treated will decrease the levothyroxine to 112 mcg once a day proper use and possible side effect discussed with the patient    Essential hypertension  Chronic asymptomatic fair control continue low-salt diet discussed the patient important lose weight    Mixed hyperlipidemia  Chronic asymptomatic LDL close to therapeutic continue current management of atorvastatin recommend the low-fat diet and important lose weight       Diagnoses and all orders for this visit:    Acquired hypothyroidism  -     levothyroxine 112 mcg tablet; Take 1 tablet (112 mcg total) by mouth daily in the early morning  -     CBC and differential; Future  -     Basic metabolic panel; Future  -     Lipid Panel with Direct LDL reflex; Future  -     TSH, 3rd generation with Free T4 reflex; Future    Type 2 diabetes mellitus without complication, without long-term current use of insulin (HCC)  -     CBC and differential; Future  -     Basic metabolic panel; Future  -     Lipid Panel with Direct LDL reflex; Future  -     TSH, 3rd generation with Free T4 reflex; Future    Mixed hyperlipidemia  -     CBC and differential; Future  -     Basic metabolic panel; Future  -     Lipid Panel with Direct LDL reflex; Future  -     TSH, 3rd generation with Free T4 reflex; Future    Essential hypertension  -     CBC and differential; Future  -     Basic metabolic panel; Future  -     Lipid Panel with Direct LDL reflex; Future  -     TSH, 3rd generation with Free T4 reflex; Future    Vitamin D deficiency  -     CBC and differential; Future  -     Basic metabolic panel; Future  -     Lipid Panel with Direct LDL reflex; Future  -     TSH, 3rd generation with Free T4 reflex;  Future    Screening mammogram, encounter for  -     Mammo screening bilateral w 3d & cad; Future

## 2020-07-13 NOTE — ASSESSMENT & PLAN NOTE
Chronic asymptomatic fair control continue low-salt diet discussed the patient important lose weight

## 2020-08-21 DIAGNOSIS — E11.9 TYPE 2 DIABETES MELLITUS WITHOUT COMPLICATION, WITHOUT LONG-TERM CURRENT USE OF INSULIN (HCC): ICD-10-CM

## 2020-08-24 ENCOUNTER — OFFICE VISIT (OUTPATIENT)
Dept: FAMILY MEDICINE CLINIC | Facility: CLINIC | Age: 60
End: 2020-08-24
Payer: COMMERCIAL

## 2020-08-24 VITALS
OXYGEN SATURATION: 98 % | HEART RATE: 74 BPM | SYSTOLIC BLOOD PRESSURE: 130 MMHG | DIASTOLIC BLOOD PRESSURE: 70 MMHG | BODY MASS INDEX: 28.85 KG/M2 | HEIGHT: 64 IN | WEIGHT: 169 LBS | TEMPERATURE: 97.2 F

## 2020-08-24 DIAGNOSIS — R31.29 OTHER MICROSCOPIC HEMATURIA: ICD-10-CM

## 2020-08-24 DIAGNOSIS — R10.30 LOWER ABDOMINAL PAIN: Primary | ICD-10-CM

## 2020-08-24 LAB
BILIRUB UR QL STRIP: NEGATIVE
CLARITY UR: CLEAR
COLOR UR: YELLOW
GLUCOSE UR STRIP-MCNC: NEGATIVE MG/DL
HGB UR QL STRIP.AUTO: NEGATIVE
KETONES UR STRIP-MCNC: NEGATIVE MG/DL
LEUKOCYTE ESTERASE UR QL STRIP: NEGATIVE
NITRITE UR QL STRIP: NEGATIVE
PH UR STRIP.AUTO: 5.5 [PH]
PROT UR STRIP-MCNC: NEGATIVE MG/DL
SL AMB  POCT GLUCOSE, UA: ABNORMAL
SL AMB LEUKOCYTE ESTERASE,UA: ABNORMAL
SL AMB POCT BILIRUBIN,UA: ABNORMAL
SL AMB POCT BLOOD,UA: ABNORMAL
SL AMB POCT CLARITY,UA: CLEAR
SL AMB POCT COLOR,UA: YELLOW
SL AMB POCT KETONES,UA: ABNORMAL
SL AMB POCT NITRITE,UA: ABNORMAL
SL AMB POCT PH,UA: 5
SL AMB POCT SPECIFIC GRAVITY,UA: 1.03
SL AMB POCT URINE PROTEIN: ABNORMAL
SL AMB POCT UROBILINOGEN: 0.2
SP GR UR STRIP.AUTO: 1.03 (ref 1–1.03)
UROBILINOGEN UR QL STRIP.AUTO: 0.2 E.U./DL

## 2020-08-24 PROCEDURE — 87086 URINE CULTURE/COLONY COUNT: CPT | Performed by: FAMILY MEDICINE

## 2020-08-24 PROCEDURE — 3061F NEG MICROALBUMINURIA REV: CPT | Performed by: FAMILY MEDICINE

## 2020-08-24 PROCEDURE — 1036F TOBACCO NON-USER: CPT | Performed by: FAMILY MEDICINE

## 2020-08-24 PROCEDURE — 99214 OFFICE O/P EST MOD 30 MIN: CPT | Performed by: FAMILY MEDICINE

## 2020-08-24 PROCEDURE — 3044F HG A1C LEVEL LT 7.0%: CPT | Performed by: FAMILY MEDICINE

## 2020-08-24 PROCEDURE — 81003 URINALYSIS AUTO W/O SCOPE: CPT | Performed by: FAMILY MEDICINE

## 2020-08-24 PROCEDURE — 3725F SCREEN DEPRESSION PERFORMED: CPT | Performed by: FAMILY MEDICINE

## 2020-08-24 PROCEDURE — 81002 URINALYSIS NONAUTO W/O SCOPE: CPT | Performed by: FAMILY MEDICINE

## 2020-08-24 PROCEDURE — 3008F BODY MASS INDEX DOCD: CPT | Performed by: FAMILY MEDICINE

## 2020-08-24 PROCEDURE — 2022F DILAT RTA XM EVC RTNOPTHY: CPT | Performed by: FAMILY MEDICINE

## 2020-08-24 PROCEDURE — 3078F DIAST BP <80 MM HG: CPT | Performed by: FAMILY MEDICINE

## 2020-08-24 PROCEDURE — 3075F SYST BP GE 130 - 139MM HG: CPT | Performed by: FAMILY MEDICINE

## 2020-08-25 LAB — BACTERIA UR CULT: NORMAL

## 2020-08-25 NOTE — PROGRESS NOTES
Subjective:   Chief Complaint   Patient presents with    Abdominal Pain     low        Patient ID: Sienna Ballard is a 63 y o  female  Abdominal Pain   This is a new problem  The current episode started in the past 7 days  The onset quality is sudden  The problem occurs constantly  The problem has been unchanged  The pain is located in the suprapubic region, RLQ and LLQ  The pain is moderate  The quality of the pain is dull  The abdominal pain does not radiate  Associated symptoms include anorexia  Pertinent negatives include no belching, constipation, diarrhea, dysuria, fever, frequency, headaches, hematochezia, hematuria, melena, nausea, vomiting or weight loss  Nothing aggravates the pain  The pain is relieved by nothing  She has tried nothing for the symptoms  Her past medical history is significant for abdominal surgery  There is no history of colon cancer, Crohn's disease, irritable bowel syndrome or ulcerative colitis  The following portions of the patient's history were reviewed and updated as appropriate: allergies, current medications, past family history, past medical history, past social history, past surgical history and problem list     Review of Systems   Constitutional: Negative for activity change, appetite change, fatigue, fever and weight loss  HENT: Negative for congestion, ear pain, sinus pressure, sinus pain and sore throat  Eyes: Negative for pain, discharge, redness and itching  Respiratory: Negative for cough, chest tightness, shortness of breath and stridor  Cardiovascular: Negative for chest pain, palpitations and leg swelling  Gastrointestinal: Positive for abdominal pain and anorexia  Negative for abdominal distention, blood in stool, constipation, diarrhea, hematochezia, melena, nausea and vomiting  Genitourinary: Negative for dysuria, flank pain, frequency and hematuria  Musculoskeletal: Negative for back pain, joint swelling and neck pain     Skin: Negative for pallor and rash  Neurological: Negative for dizziness, tremors, weakness, numbness and headaches  Hematological: Does not bruise/bleed easily  Objective:  Vitals:    08/24/20 1409   BP: 130/70   Pulse: 74   Temp: (!) 97 2 °F (36 2 °C)   TempSrc: Tympanic   SpO2: 98%   Weight: 76 7 kg (169 lb)   Height: 5' 4" (1 626 m)      Physical Exam  Vitals signs and nursing note reviewed  Constitutional:       General: She is not in acute distress  Appearance: She is well-developed  She is not diaphoretic  HENT:      Head: Normocephalic  Right Ear: Tympanic membrane, ear canal and external ear normal       Left Ear: Tympanic membrane, ear canal and external ear normal       Nose: Nose normal  No congestion or rhinorrhea  Mouth/Throat:      Mouth: Mucous membranes are moist       Pharynx: Oropharynx is clear  No oropharyngeal exudate or posterior oropharyngeal erythema  Eyes:      General: No scleral icterus  Right eye: No discharge  Left eye: No discharge  Conjunctiva/sclera: Conjunctivae normal       Pupils: Pupils are equal, round, and reactive to light  Neck:      Musculoskeletal: Normal range of motion and neck supple  Vascular: No JVD  Cardiovascular:      Rate and Rhythm: Normal rate and regular rhythm  Heart sounds: Normal heart sounds  No murmur  No gallop  Pulmonary:      Effort: Pulmonary effort is normal  No respiratory distress  Breath sounds: Normal breath sounds  No stridor  No wheezing or rales  Chest:      Chest wall: No tenderness  Abdominal:      General: There is no distension  Palpations: Abdomen is soft  There is no mass  Tenderness: There is abdominal tenderness in the right lower quadrant, suprapubic area and left lower quadrant  There is no guarding or rebound  Musculoskeletal:         General: No tenderness  Lymphadenopathy:      Cervical: No cervical adenopathy     Skin:     General: Skin is warm       Findings: No erythema or rash  Neurological:      Mental Status: She is alert and oriented to person, place, and time  Motor: No weakness        Gait: Gait normal            Assessment/Plan:    Lower abdominal pain  New diagnosis symptomatic patient does have HX of total hysterectomy, no change in bowel habit ,urine dip in office is abnormal positive for blood o HX of smoking or personal or FHX of kidney stone  Plan for Ctscan of abdomen/pelvic ,,send urine for UA and micro      Other microscopic hematuria  Abnormal urine finding with pelvic pain plan for Ctscan of abdomen /pelvic        Diagnoses and all orders for this visit:    Lower abdominal pain  -     POCT urine dip  -     UA (URINE) with reflex to Scope  -     Urine culture  -     CT abdomen pelvis w wo contrast; Future    Other microscopic hematuria

## 2020-08-25 NOTE — ASSESSMENT & PLAN NOTE
New diagnosis symptomatic patient does have HX of total hysterectomy, no change in bowel habit ,urine dip in office is abnormal positive for blood o HX of smoking or personal or FHX of kidney stone  Plan for Ctscan of abdomen/pelvic ,,send urine for UA and micro

## 2020-08-31 ENCOUNTER — HOSPITAL ENCOUNTER (OUTPATIENT)
Dept: CT IMAGING | Facility: HOSPITAL | Age: 60
Discharge: HOME | End: 2020-08-31
Payer: COMMERCIAL

## 2020-08-31 DIAGNOSIS — R10.30 LOWER ABDOMINAL PAIN: ICD-10-CM

## 2020-08-31 PROCEDURE — 74177 CT ABD & PELVIS W/CONTRAST: CPT

## 2020-08-31 PROCEDURE — G1004 CDSM NDSC: HCPCS

## 2020-08-31 RX ADMIN — IOHEXOL 100 ML: 350 INJECTION, SOLUTION INTRAVENOUS at 20:01

## 2020-09-02 DIAGNOSIS — E78.2 MIXED HYPERLIPIDEMIA: ICD-10-CM

## 2020-09-02 RX ORDER — ATORVASTATIN CALCIUM 10 MG/1
TABLET, FILM COATED ORAL
Qty: 30 TABLET | Refills: 2 | Status: SHIPPED | OUTPATIENT
Start: 2020-09-02 | End: 2020-12-18

## 2020-09-03 ENCOUNTER — TELEPHONE (OUTPATIENT)
Dept: FAMILY MEDICINE CLINIC | Facility: CLINIC | Age: 60
End: 2020-09-03

## 2020-09-03 NOTE — TELEPHONE ENCOUNTER
----- Message from Jvaier Borwn MD sent at 9/3/2020  8:06 AM EDT -----  Negative Ctscan of abdomen/pelvic

## 2020-09-15 DIAGNOSIS — R42 VERTIGO: ICD-10-CM

## 2020-09-15 RX ORDER — MECLIZINE HYDROCHLORIDE 25 MG/1
TABLET ORAL
Qty: 30 TABLET | Refills: 0 | Status: SHIPPED | OUTPATIENT
Start: 2020-09-15 | End: 2020-12-14 | Stop reason: ALTCHOICE

## 2020-09-30 DIAGNOSIS — E11.9 TYPE 2 DIABETES MELLITUS WITHOUT COMPLICATION, WITHOUT LONG-TERM CURRENT USE OF INSULIN (HCC): ICD-10-CM

## 2020-09-30 RX ORDER — BLOOD SUGAR DIAGNOSTIC
STRIP MISCELLANEOUS
Qty: 100 EACH | Refills: 1 | Status: SHIPPED | OUTPATIENT
Start: 2020-09-30 | End: 2021-02-07

## 2020-10-03 ENCOUNTER — APPOINTMENT (OUTPATIENT)
Dept: LAB | Facility: HOSPITAL | Age: 60
End: 2020-10-03
Payer: COMMERCIAL

## 2020-10-03 DIAGNOSIS — I10 ESSENTIAL HYPERTENSION: ICD-10-CM

## 2020-10-03 DIAGNOSIS — E11.9 TYPE 2 DIABETES MELLITUS WITHOUT COMPLICATION, WITHOUT LONG-TERM CURRENT USE OF INSULIN (HCC): ICD-10-CM

## 2020-10-03 DIAGNOSIS — E55.9 VITAMIN D DEFICIENCY: ICD-10-CM

## 2020-10-03 DIAGNOSIS — E78.2 MIXED HYPERLIPIDEMIA: ICD-10-CM

## 2020-10-03 DIAGNOSIS — E03.9 ACQUIRED HYPOTHYROIDISM: ICD-10-CM

## 2020-10-03 LAB
ANION GAP SERPL CALCULATED.3IONS-SCNC: 3 MMOL/L (ref 4–13)
BASOPHILS # BLD AUTO: 0.03 THOUSANDS/ΜL (ref 0–0.1)
BASOPHILS NFR BLD AUTO: 1 % (ref 0–1)
BUN SERPL-MCNC: 17 MG/DL (ref 5–25)
CALCIUM SERPL-MCNC: 8.8 MG/DL (ref 8.3–10.1)
CHLORIDE SERPL-SCNC: 106 MMOL/L (ref 100–108)
CHOLEST SERPL-MCNC: 181 MG/DL (ref 50–200)
CO2 SERPL-SCNC: 31 MMOL/L (ref 21–32)
CREAT SERPL-MCNC: 0.87 MG/DL (ref 0.6–1.3)
EOSINOPHIL # BLD AUTO: 0.25 THOUSAND/ΜL (ref 0–0.61)
EOSINOPHIL NFR BLD AUTO: 5 % (ref 0–6)
ERYTHROCYTE [DISTWIDTH] IN BLOOD BY AUTOMATED COUNT: 12 % (ref 11.6–15.1)
GFR SERPL CREATININE-BSD FRML MDRD: 72 ML/MIN/1.73SQ M
GLUCOSE P FAST SERPL-MCNC: 123 MG/DL (ref 65–99)
HCT VFR BLD AUTO: 43.2 % (ref 34.8–46.1)
HDLC SERPL-MCNC: 54 MG/DL
HGB BLD-MCNC: 13.9 G/DL (ref 11.5–15.4)
IMM GRANULOCYTES # BLD AUTO: 0.02 THOUSAND/UL (ref 0–0.2)
IMM GRANULOCYTES NFR BLD AUTO: 0 % (ref 0–2)
LDLC SERPL CALC-MCNC: 113 MG/DL (ref 0–100)
LYMPHOCYTES # BLD AUTO: 1.88 THOUSANDS/ΜL (ref 0.6–4.47)
LYMPHOCYTES NFR BLD AUTO: 35 % (ref 14–44)
MCH RBC QN AUTO: 29.4 PG (ref 26.8–34.3)
MCHC RBC AUTO-ENTMCNC: 32.2 G/DL (ref 31.4–37.4)
MCV RBC AUTO: 91 FL (ref 82–98)
MONOCYTES # BLD AUTO: 0.37 THOUSAND/ΜL (ref 0.17–1.22)
MONOCYTES NFR BLD AUTO: 7 % (ref 4–12)
NEUTROPHILS # BLD AUTO: 2.82 THOUSANDS/ΜL (ref 1.85–7.62)
NEUTS SEG NFR BLD AUTO: 52 % (ref 43–75)
NRBC BLD AUTO-RTO: 0 /100 WBCS
PLATELET # BLD AUTO: 204 THOUSANDS/UL (ref 149–390)
PMV BLD AUTO: 10.3 FL (ref 8.9–12.7)
POTASSIUM SERPL-SCNC: 4 MMOL/L (ref 3.5–5.3)
RBC # BLD AUTO: 4.73 MILLION/UL (ref 3.81–5.12)
SODIUM SERPL-SCNC: 140 MMOL/L (ref 136–145)
TRIGL SERPL-MCNC: 71 MG/DL
TSH SERPL DL<=0.05 MIU/L-ACNC: 3.11 UIU/ML (ref 0.36–3.74)
WBC # BLD AUTO: 5.37 THOUSAND/UL (ref 4.31–10.16)

## 2020-10-03 PROCEDURE — 36415 COLL VENOUS BLD VENIPUNCTURE: CPT

## 2020-10-03 PROCEDURE — 84443 ASSAY THYROID STIM HORMONE: CPT

## 2020-10-03 PROCEDURE — 85025 COMPLETE CBC W/AUTO DIFF WBC: CPT

## 2020-10-03 PROCEDURE — 80061 LIPID PANEL: CPT

## 2020-10-03 PROCEDURE — 80048 BASIC METABOLIC PNL TOTAL CA: CPT

## 2020-10-13 ENCOUNTER — OFFICE VISIT (OUTPATIENT)
Dept: FAMILY MEDICINE CLINIC | Facility: CLINIC | Age: 60
End: 2020-10-13
Payer: COMMERCIAL

## 2020-10-13 VITALS
WEIGHT: 171 LBS | RESPIRATION RATE: 16 BRPM | OXYGEN SATURATION: 98 % | HEIGHT: 64 IN | HEART RATE: 65 BPM | TEMPERATURE: 98.1 F | SYSTOLIC BLOOD PRESSURE: 120 MMHG | DIASTOLIC BLOOD PRESSURE: 80 MMHG | BODY MASS INDEX: 29.19 KG/M2

## 2020-10-13 DIAGNOSIS — Z28.21 IMMUNIZATION REFUSED: ICD-10-CM

## 2020-10-13 DIAGNOSIS — E03.9 ACQUIRED HYPOTHYROIDISM: ICD-10-CM

## 2020-10-13 DIAGNOSIS — E11.9 TYPE 2 DIABETES MELLITUS WITHOUT COMPLICATION, WITHOUT LONG-TERM CURRENT USE OF INSULIN (HCC): ICD-10-CM

## 2020-10-13 DIAGNOSIS — I83.812 VARICOSE VEINS OF LEFT LOWER EXTREMITY WITH PAIN: Primary | ICD-10-CM

## 2020-10-13 DIAGNOSIS — E78.2 MIXED HYPERLIPIDEMIA: ICD-10-CM

## 2020-10-13 PROCEDURE — 3079F DIAST BP 80-89 MM HG: CPT | Performed by: FAMILY MEDICINE

## 2020-10-13 PROCEDURE — 99214 OFFICE O/P EST MOD 30 MIN: CPT | Performed by: FAMILY MEDICINE

## 2020-10-13 PROCEDURE — 3074F SYST BP LT 130 MM HG: CPT | Performed by: FAMILY MEDICINE

## 2020-10-13 PROCEDURE — 1036F TOBACCO NON-USER: CPT | Performed by: FAMILY MEDICINE

## 2020-10-19 ENCOUNTER — HOSPITAL ENCOUNTER (OUTPATIENT)
Dept: NON INVASIVE DIAGNOSTICS | Facility: HOSPITAL | Age: 60
Discharge: HOME | End: 2020-10-19
Payer: COMMERCIAL

## 2020-10-19 DIAGNOSIS — I83.812 VARICOSE VEINS OF LEFT LOWER EXTREMITY WITH PAIN: ICD-10-CM

## 2020-10-19 PROCEDURE — 93971 EXTREMITY STUDY: CPT | Performed by: SURGERY

## 2020-10-19 PROCEDURE — 93971 EXTREMITY STUDY: CPT

## 2020-10-21 ENCOUNTER — TELEPHONE (OUTPATIENT)
Dept: FAMILY MEDICINE CLINIC | Facility: CLINIC | Age: 60
End: 2020-10-21

## 2020-11-06 ENCOUNTER — OFFICE VISIT (OUTPATIENT)
Dept: FAMILY MEDICINE CLINIC | Facility: CLINIC | Age: 60
End: 2020-11-06
Payer: COMMERCIAL

## 2020-11-06 VITALS
BODY MASS INDEX: 29.02 KG/M2 | SYSTOLIC BLOOD PRESSURE: 130 MMHG | WEIGHT: 170 LBS | TEMPERATURE: 98.5 F | HEIGHT: 64 IN | HEART RATE: 71 BPM | OXYGEN SATURATION: 98 % | DIASTOLIC BLOOD PRESSURE: 80 MMHG

## 2020-11-06 DIAGNOSIS — G89.29 CHRONIC PAIN OF RIGHT KNEE: Primary | ICD-10-CM

## 2020-11-06 DIAGNOSIS — M25.561 CHRONIC PAIN OF RIGHT KNEE: Primary | ICD-10-CM

## 2020-11-06 PROCEDURE — 20610 DRAIN/INJ JOINT/BURSA W/O US: CPT | Performed by: FAMILY MEDICINE

## 2020-11-06 PROCEDURE — 3079F DIAST BP 80-89 MM HG: CPT | Performed by: FAMILY MEDICINE

## 2020-11-06 PROCEDURE — 1036F TOBACCO NON-USER: CPT | Performed by: FAMILY MEDICINE

## 2020-11-06 PROCEDURE — 3008F BODY MASS INDEX DOCD: CPT | Performed by: FAMILY MEDICINE

## 2020-11-06 PROCEDURE — 3725F SCREEN DEPRESSION PERFORMED: CPT | Performed by: FAMILY MEDICINE

## 2020-11-06 PROCEDURE — 99214 OFFICE O/P EST MOD 30 MIN: CPT | Performed by: FAMILY MEDICINE

## 2020-11-06 PROCEDURE — 3075F SYST BP GE 130 - 139MM HG: CPT | Performed by: FAMILY MEDICINE

## 2020-11-06 RX ORDER — TRIAMCINOLONE ACETONIDE 40 MG/ML
40 INJECTION, SUSPENSION INTRA-ARTICULAR; INTRAMUSCULAR
Status: COMPLETED | OUTPATIENT
Start: 2020-11-06 | End: 2020-11-06

## 2020-11-06 RX ADMIN — TRIAMCINOLONE ACETONIDE 40 MG: 40 INJECTION, SUSPENSION INTRA-ARTICULAR; INTRAMUSCULAR at 20:25

## 2020-11-07 ENCOUNTER — HOSPITAL ENCOUNTER (OUTPATIENT)
Dept: RADIOLOGY | Facility: HOSPITAL | Age: 60
Discharge: HOME | End: 2020-11-07
Payer: COMMERCIAL

## 2020-11-07 DIAGNOSIS — M25.561 CHRONIC PAIN OF RIGHT KNEE: ICD-10-CM

## 2020-11-07 DIAGNOSIS — G89.29 CHRONIC PAIN OF RIGHT KNEE: ICD-10-CM

## 2020-11-07 PROCEDURE — 73562 X-RAY EXAM OF KNEE 3: CPT

## 2020-11-12 ENCOUNTER — TELEPHONE (OUTPATIENT)
Dept: FAMILY MEDICINE CLINIC | Facility: CLINIC | Age: 60
End: 2020-11-12

## 2020-11-12 DIAGNOSIS — M25.561 CHRONIC PAIN OF RIGHT KNEE: Primary | ICD-10-CM

## 2020-11-12 DIAGNOSIS — G89.29 CHRONIC PAIN OF RIGHT KNEE: Primary | ICD-10-CM

## 2020-12-14 ENCOUNTER — OFFICE VISIT (OUTPATIENT)
Dept: FAMILY MEDICINE CLINIC | Facility: CLINIC | Age: 60
End: 2020-12-14
Payer: COMMERCIAL

## 2020-12-14 ENCOUNTER — CONSULT (OUTPATIENT)
Dept: OBGYN CLINIC | Facility: MEDICAL CENTER | Age: 60
End: 2020-12-14
Payer: COMMERCIAL

## 2020-12-14 VITALS
SYSTOLIC BLOOD PRESSURE: 110 MMHG | HEART RATE: 68 BPM | HEIGHT: 64 IN | DIASTOLIC BLOOD PRESSURE: 60 MMHG | WEIGHT: 171 LBS | TEMPERATURE: 97.4 F | BODY MASS INDEX: 29.19 KG/M2 | OXYGEN SATURATION: 98 %

## 2020-12-14 VITALS
HEIGHT: 66 IN | TEMPERATURE: 98.7 F | BODY MASS INDEX: 27.32 KG/M2 | SYSTOLIC BLOOD PRESSURE: 130 MMHG | DIASTOLIC BLOOD PRESSURE: 69 MMHG | WEIGHT: 170 LBS | HEART RATE: 72 BPM

## 2020-12-14 DIAGNOSIS — E03.9 ACQUIRED HYPOTHYROIDISM: ICD-10-CM

## 2020-12-14 DIAGNOSIS — Z00.01 ENCOUNTER FOR WELL ADULT EXAM WITH ABNORMAL FINDINGS: Primary | ICD-10-CM

## 2020-12-14 DIAGNOSIS — G89.29 CHRONIC PAIN OF RIGHT KNEE: ICD-10-CM

## 2020-12-14 DIAGNOSIS — E11.9 TYPE 2 DIABETES MELLITUS WITHOUT COMPLICATION, WITHOUT LONG-TERM CURRENT USE OF INSULIN (HCC): ICD-10-CM

## 2020-12-14 DIAGNOSIS — M17.0 BILATERAL PRIMARY OSTEOARTHRITIS OF KNEE: Primary | ICD-10-CM

## 2020-12-14 DIAGNOSIS — E78.2 MIXED HYPERLIPIDEMIA: ICD-10-CM

## 2020-12-14 DIAGNOSIS — M25.561 CHRONIC PAIN OF RIGHT KNEE: ICD-10-CM

## 2020-12-14 PROCEDURE — 3078F DIAST BP <80 MM HG: CPT | Performed by: ORTHOPAEDIC SURGERY

## 2020-12-14 PROCEDURE — 1036F TOBACCO NON-USER: CPT | Performed by: ORTHOPAEDIC SURGERY

## 2020-12-14 PROCEDURE — 3075F SYST BP GE 130 - 139MM HG: CPT | Performed by: ORTHOPAEDIC SURGERY

## 2020-12-14 PROCEDURE — 3008F BODY MASS INDEX DOCD: CPT | Performed by: FAMILY MEDICINE

## 2020-12-14 PROCEDURE — 99243 OFF/OP CNSLTJ NEW/EST LOW 30: CPT | Performed by: ORTHOPAEDIC SURGERY

## 2020-12-14 PROCEDURE — 99396 PREV VISIT EST AGE 40-64: CPT | Performed by: FAMILY MEDICINE

## 2020-12-14 PROCEDURE — 3725F SCREEN DEPRESSION PERFORMED: CPT | Performed by: FAMILY MEDICINE

## 2020-12-17 DIAGNOSIS — E03.9 ACQUIRED HYPOTHYROIDISM: ICD-10-CM

## 2020-12-17 RX ORDER — LEVOTHYROXINE SODIUM 112 UG/1
112 TABLET ORAL
Qty: 30 TABLET | Refills: 5 | Status: SHIPPED | OUTPATIENT
Start: 2020-12-17 | End: 2021-06-02

## 2020-12-18 DIAGNOSIS — E78.2 MIXED HYPERLIPIDEMIA: ICD-10-CM

## 2020-12-18 RX ORDER — ATORVASTATIN CALCIUM 10 MG/1
TABLET, FILM COATED ORAL
Qty: 30 TABLET | Refills: 2 | Status: SHIPPED | OUTPATIENT
Start: 2020-12-18 | End: 2021-03-12

## 2020-12-22 DIAGNOSIS — E11.9 TYPE 2 DIABETES MELLITUS WITHOUT COMPLICATION, WITHOUT LONG-TERM CURRENT USE OF INSULIN (HCC): ICD-10-CM

## 2020-12-23 ENCOUNTER — TELEPHONE (OUTPATIENT)
Dept: FAMILY MEDICINE CLINIC | Facility: CLINIC | Age: 60
End: 2020-12-23

## 2020-12-23 ENCOUNTER — HOSPITAL ENCOUNTER (OUTPATIENT)
Dept: MAMMOGRAPHY | Facility: CLINIC | Age: 60
Discharge: HOME | End: 2020-12-23
Payer: COMMERCIAL

## 2020-12-23 DIAGNOSIS — Z12.31 ENCOUNTER FOR SCREENING MAMMOGRAM FOR MALIGNANT NEOPLASM OF BREAST: ICD-10-CM

## 2020-12-23 DIAGNOSIS — Z12.31 SCREENING MAMMOGRAM, ENCOUNTER FOR: ICD-10-CM

## 2020-12-23 PROCEDURE — 77067 SCR MAMMO BI INCL CAD: CPT

## 2020-12-23 PROCEDURE — 77063 BREAST TOMOSYNTHESIS BI: CPT

## 2021-01-18 ENCOUNTER — OFFICE VISIT (OUTPATIENT)
Dept: OBGYN CLINIC | Facility: MEDICAL CENTER | Age: 61
End: 2021-01-18
Payer: COMMERCIAL

## 2021-01-18 VITALS
HEIGHT: 63 IN | SYSTOLIC BLOOD PRESSURE: 127 MMHG | HEART RATE: 75 BPM | BODY MASS INDEX: 30.3 KG/M2 | DIASTOLIC BLOOD PRESSURE: 69 MMHG | WEIGHT: 171 LBS

## 2021-01-18 DIAGNOSIS — M17.0 BILATERAL PRIMARY OSTEOARTHRITIS OF KNEE: Primary | ICD-10-CM

## 2021-01-18 PROCEDURE — 3008F BODY MASS INDEX DOCD: CPT | Performed by: ORTHOPAEDIC SURGERY

## 2021-01-18 PROCEDURE — 20610 DRAIN/INJ JOINT/BURSA W/O US: CPT | Performed by: ORTHOPAEDIC SURGERY

## 2021-01-18 NOTE — PROGRESS NOTES
Ortho Sports Medicine Knee Visco Injection Visit     Assesment:   63 y o  female bilateral knee OA    Plan:    Injection:  The risks and benefits of the injection (which include but are not limited to: infection, bleeding,damage to nerve/artery, need for further intervention), as well as the risks and benefits of all alternative treatments were explained and understood  The patient elected to proceed with injection  The procedure was done with aseptic technique, and the patient tolerated the procedure well with no complications  A viscosupplementation injection was performed  Ice to the knee 1-2 times daily for 20 minutes, for next 24-48 hrs  Follow up:  Return in about 3 months (around 4/18/2021) for Recheck  Chief Complaint   Patient presents with    Left Knee - Follow-up    Right Knee - Follow-up       History of Present Illness: The patient is returns for  Bilateral knee synvisc-one  visco supplementation injection  Since the prior visit, She reports no improvement  Patient denies fevers, chills, and sweats  Denies numbness and tingling  Denies chest pain, shortness of breath, calf pain, and warmth to the knee  I have reviewed the past medical, surgical, social and family history, medications and allergies as documented in the EMR  Review of systems: ROS is negative other than that noted in the HPI  Constitutional: Negative for fatigue and fever  Physical Exam:    Blood pressure 127/69, pulse 75, height 5' 3" (1 6 m), weight 77 6 kg (171 lb), not currently breastfeeding      General/Constitutional: NAD, well developed, well nourished  HENT: Normocephalic, atraumatic  CV: Intact distal pulses, regular rate  Resp: No respiratory distress or labored breathing  Lymphatic: No lymphadenopathy palpated  Neuro: Alert and Oriented x 3, no focal deficits  Psych: Normal mood, normal affect, normal judgement, normal behavior  Skin: Warm, dry, no rashes, no erythema    Large joint arthrocentesis: bilateral knee  Universal Protocol:  Consent given by: patient  Time out: Immediately prior to procedure a "time out" was called to verify the correct patient, procedure, equipment, support staff and site/side marked as required    Timeout called at: 1/18/2021 1:04 PM   Patient understanding: patient states understanding of the procedure being performed  Site marked: the operative site was marked  Patient identity confirmed: verbally with patient    Supporting Documentation  Indications: pain   Procedure Details  Location: knee - bilateral knee  Preparation: Patient was prepped and draped in the usual sterile fashion  Needle size: 22 G  Ultrasound guidance: no  Approach: lateral    Medications (Right): 48 mg hylan 48 MG/6MLMedications (Left): 48 mg hylan 48 MG/6ML   Patient tolerance: patient tolerated the procedure well with no immediate complications  Dressing:  Sterile dressing applied

## 2021-01-30 ENCOUNTER — LAB (OUTPATIENT)
Dept: LAB | Facility: HOSPITAL | Age: 61
End: 2021-01-30
Payer: COMMERCIAL

## 2021-01-30 DIAGNOSIS — E03.9 ACQUIRED HYPOTHYROIDISM: ICD-10-CM

## 2021-01-30 DIAGNOSIS — E11.9 TYPE 2 DIABETES MELLITUS WITHOUT COMPLICATION, WITHOUT LONG-TERM CURRENT USE OF INSULIN (HCC): ICD-10-CM

## 2021-01-30 DIAGNOSIS — E78.2 MIXED HYPERLIPIDEMIA: ICD-10-CM

## 2021-01-30 LAB
ALBUMIN SERPL BCP-MCNC: 3.6 G/DL (ref 3.5–5)
ALP SERPL-CCNC: 86 U/L (ref 46–116)
ALT SERPL W P-5'-P-CCNC: 35 U/L (ref 12–78)
ANION GAP SERPL CALCULATED.3IONS-SCNC: 9 MMOL/L (ref 4–13)
AST SERPL W P-5'-P-CCNC: 26 U/L (ref 5–45)
BASOPHILS # BLD AUTO: 0.02 THOUSANDS/ΜL (ref 0–0.1)
BASOPHILS NFR BLD AUTO: 0 % (ref 0–1)
BILIRUB SERPL-MCNC: 0.67 MG/DL (ref 0.2–1)
BUN SERPL-MCNC: 15 MG/DL (ref 5–25)
CALCIUM SERPL-MCNC: 9.3 MG/DL (ref 8.3–10.1)
CHLORIDE SERPL-SCNC: 105 MMOL/L (ref 100–108)
CHOLEST SERPL-MCNC: 154 MG/DL (ref 50–200)
CO2 SERPL-SCNC: 28 MMOL/L (ref 21–32)
CREAT SERPL-MCNC: 0.76 MG/DL (ref 0.6–1.3)
EOSINOPHIL # BLD AUTO: 0.17 THOUSAND/ΜL (ref 0–0.61)
EOSINOPHIL NFR BLD AUTO: 3 % (ref 0–6)
ERYTHROCYTE [DISTWIDTH] IN BLOOD BY AUTOMATED COUNT: 11.9 % (ref 11.6–15.1)
EST. AVERAGE GLUCOSE BLD GHB EST-MCNC: 131 MG/DL
GFR SERPL CREATININE-BSD FRML MDRD: 85 ML/MIN/1.73SQ M
GLUCOSE P FAST SERPL-MCNC: 117 MG/DL (ref 65–99)
HBA1C MFR BLD: 6.2 %
HCT VFR BLD AUTO: 42.3 % (ref 34.8–46.1)
HDLC SERPL-MCNC: 58 MG/DL
HGB BLD-MCNC: 14.1 G/DL (ref 11.5–15.4)
IMM GRANULOCYTES # BLD AUTO: 0.01 THOUSAND/UL (ref 0–0.2)
IMM GRANULOCYTES NFR BLD AUTO: 0 % (ref 0–2)
LDLC SERPL CALC-MCNC: 80 MG/DL (ref 0–100)
LYMPHOCYTES # BLD AUTO: 1.89 THOUSANDS/ΜL (ref 0.6–4.47)
LYMPHOCYTES NFR BLD AUTO: 33 % (ref 14–44)
MCH RBC QN AUTO: 29.9 PG (ref 26.8–34.3)
MCHC RBC AUTO-ENTMCNC: 33.3 G/DL (ref 31.4–37.4)
MCV RBC AUTO: 90 FL (ref 82–98)
MONOCYTES # BLD AUTO: 0.48 THOUSAND/ΜL (ref 0.17–1.22)
MONOCYTES NFR BLD AUTO: 8 % (ref 4–12)
NEUTROPHILS # BLD AUTO: 3.13 THOUSANDS/ΜL (ref 1.85–7.62)
NEUTS SEG NFR BLD AUTO: 56 % (ref 43–75)
NONHDLC SERPL-MCNC: 96 MG/DL
NRBC BLD AUTO-RTO: 0 /100 WBCS
PLATELET # BLD AUTO: 223 THOUSANDS/UL (ref 149–390)
PMV BLD AUTO: 10.9 FL (ref 8.9–12.7)
POTASSIUM SERPL-SCNC: 3.9 MMOL/L (ref 3.5–5.3)
PROT SERPL-MCNC: 7.4 G/DL (ref 6.4–8.2)
RBC # BLD AUTO: 4.72 MILLION/UL (ref 3.81–5.12)
SODIUM SERPL-SCNC: 142 MMOL/L (ref 136–145)
TRIGL SERPL-MCNC: 81 MG/DL
TSH SERPL DL<=0.05 MIU/L-ACNC: 1.87 UIU/ML (ref 0.36–3.74)
WBC # BLD AUTO: 5.7 THOUSAND/UL (ref 4.31–10.16)

## 2021-01-30 PROCEDURE — 3044F HG A1C LEVEL LT 7.0%: CPT | Performed by: FAMILY MEDICINE

## 2021-01-30 PROCEDURE — 36415 COLL VENOUS BLD VENIPUNCTURE: CPT

## 2021-01-30 PROCEDURE — 84443 ASSAY THYROID STIM HORMONE: CPT

## 2021-01-30 PROCEDURE — 83036 HEMOGLOBIN GLYCOSYLATED A1C: CPT

## 2021-01-30 PROCEDURE — 85025 COMPLETE CBC W/AUTO DIFF WBC: CPT

## 2021-01-30 PROCEDURE — 80061 LIPID PANEL: CPT

## 2021-01-30 PROCEDURE — 80053 COMPREHEN METABOLIC PANEL: CPT

## 2021-02-06 DIAGNOSIS — E11.9 TYPE 2 DIABETES MELLITUS WITHOUT COMPLICATION, WITHOUT LONG-TERM CURRENT USE OF INSULIN (HCC): ICD-10-CM

## 2021-02-07 RX ORDER — BLOOD SUGAR DIAGNOSTIC
STRIP MISCELLANEOUS
Qty: 100 EACH | Refills: 1 | Status: SHIPPED | OUTPATIENT
Start: 2021-02-07 | End: 2021-05-10

## 2021-02-08 ENCOUNTER — OFFICE VISIT (OUTPATIENT)
Dept: FAMILY MEDICINE CLINIC | Facility: CLINIC | Age: 61
End: 2021-02-08
Payer: COMMERCIAL

## 2021-02-08 VITALS
HEART RATE: 72 BPM | SYSTOLIC BLOOD PRESSURE: 120 MMHG | DIASTOLIC BLOOD PRESSURE: 80 MMHG | BODY MASS INDEX: 29.71 KG/M2 | WEIGHT: 174 LBS | HEIGHT: 64 IN | OXYGEN SATURATION: 98 % | TEMPERATURE: 97.4 F

## 2021-02-08 DIAGNOSIS — E11.9 TYPE 2 DIABETES MELLITUS WITHOUT COMPLICATION, WITHOUT LONG-TERM CURRENT USE OF INSULIN (HCC): Primary | ICD-10-CM

## 2021-02-08 DIAGNOSIS — Z01.00 DIABETIC EYE EXAM (HCC): ICD-10-CM

## 2021-02-08 DIAGNOSIS — E03.9 ACQUIRED HYPOTHYROIDISM: ICD-10-CM

## 2021-02-08 DIAGNOSIS — E78.2 MIXED HYPERLIPIDEMIA: ICD-10-CM

## 2021-02-08 DIAGNOSIS — I10 ESSENTIAL HYPERTENSION: ICD-10-CM

## 2021-02-08 DIAGNOSIS — M17.12 PRIMARY OSTEOARTHRITIS OF LEFT KNEE: ICD-10-CM

## 2021-02-08 DIAGNOSIS — E11.9 DIABETIC EYE EXAM (HCC): ICD-10-CM

## 2021-02-08 LAB
CREAT UR-MCNC: 54.7 MG/DL
MICROALBUMIN UR-MCNC: <5 MG/L (ref 0–20)
MICROALBUMIN/CREAT 24H UR: <9 MG/G CREATININE (ref 0–30)

## 2021-02-08 PROCEDURE — 3008F BODY MASS INDEX DOCD: CPT | Performed by: FAMILY MEDICINE

## 2021-02-08 PROCEDURE — 99214 OFFICE O/P EST MOD 30 MIN: CPT | Performed by: FAMILY MEDICINE

## 2021-02-08 PROCEDURE — 82570 ASSAY OF URINE CREATININE: CPT | Performed by: FAMILY MEDICINE

## 2021-02-08 PROCEDURE — 3079F DIAST BP 80-89 MM HG: CPT | Performed by: FAMILY MEDICINE

## 2021-02-08 PROCEDURE — 82043 UR ALBUMIN QUANTITATIVE: CPT | Performed by: FAMILY MEDICINE

## 2021-02-08 PROCEDURE — 3074F SYST BP LT 130 MM HG: CPT | Performed by: FAMILY MEDICINE

## 2021-02-08 PROCEDURE — 3725F SCREEN DEPRESSION PERFORMED: CPT | Performed by: FAMILY MEDICINE

## 2021-02-08 PROCEDURE — 1036F TOBACCO NON-USER: CPT | Performed by: FAMILY MEDICINE

## 2021-02-08 RX ORDER — HYLAN G-F 20 16MG/2ML
SYRINGE (ML) INTRAARTICULAR
COMMUNITY
Start: 2020-12-30 | End: 2021-05-10 | Stop reason: ALTCHOICE

## 2021-02-08 NOTE — PROGRESS NOTES
BMI Counseling: Body mass index is 30 34 kg/m²  The BMI is above normal  Nutrition recommendations include decreasing portion sizes, consuming healthier snacks and limiting drinks that contain sugar  Exercise recommendations include exercising 3-5 times per week  No pharmacotherapy was ordered  Patient referred to PCP due to patient being overweight  Subjective:   Chief Complaint   Patient presents with    Follow-up     chronic conditions        Patient ID: Catalina Cartagena is a 63 y o  female  Patient here follow-up with a chronic condition patient history of non-insulin-dependent diabetic on metformin tolerated well without any side effect patient compliant with the medication on compliant with the low carb diet deny increased thirsty increased frequency urination no dizziness no headache and no abdomen pain patient history of hyperlipidemia on statin no rash and no muscle pain patient history of high blood pressure try to controlled with the low-salt diet deny any chest pain short of breath no palpitation no dyspnea on exertion and no lower extremity edema   recent blood work discussed with the patient      The following portions of the patient's history were reviewed and updated as appropriate: allergies, current medications, past family history, past medical history, past social history, past surgical history and problem list     Review of Systems   Constitutional: Negative for activity change, appetite change, fatigue and fever  HENT: Negative for congestion, ear pain, sinus pressure, sinus pain and sore throat  Eyes: Negative for pain, discharge, redness and itching  Respiratory: Negative for cough, chest tightness, shortness of breath and stridor  Cardiovascular: Negative for chest pain, palpitations and leg swelling  Gastrointestinal: Negative for abdominal pain, blood in stool, constipation, diarrhea and nausea     Genitourinary: Negative for dysuria, flank pain, frequency and hematuria  Musculoskeletal: Negative for back pain, joint swelling and neck pain  Skin: Negative for pallor and rash  Neurological: Negative for dizziness, tremors, weakness, numbness and headaches  Hematological: Does not bruise/bleed easily  Objective:  Vitals:    02/08/21 1602   BP: 120/80   Pulse: 72   Temp: (!) 97 4 °F (36 3 °C)   TempSrc: Tympanic   SpO2: 98%   Weight: 78 9 kg (174 lb)   Height: 5' 3 5" (1 613 m)      Physical Exam  Vitals signs and nursing note reviewed  Constitutional:       General: She is not in acute distress  Appearance: She is well-developed  She is not diaphoretic  HENT:      Head: Normocephalic  Right Ear: Tympanic membrane, ear canal and external ear normal       Left Ear: Tympanic membrane, ear canal and external ear normal       Nose: Nose normal  No congestion or rhinorrhea  Mouth/Throat:      Mouth: Mucous membranes are moist       Pharynx: Oropharynx is clear  No oropharyngeal exudate or posterior oropharyngeal erythema  Eyes:      General:         Right eye: No discharge  Left eye: No discharge  Conjunctiva/sclera: Conjunctivae normal       Pupils: Pupils are equal, round, and reactive to light  Neck:      Musculoskeletal: Normal range of motion and neck supple  Vascular: No JVD  Cardiovascular:      Rate and Rhythm: Normal rate and regular rhythm  Pulses: no weak pulses          Dorsalis pedis pulses are 2+ on the right side and 2+ on the left side  Heart sounds: Normal heart sounds  No murmur  No gallop  Pulmonary:      Effort: Pulmonary effort is normal  No respiratory distress  Breath sounds: Normal breath sounds  No stridor  No wheezing or rales  Chest:      Chest wall: No tenderness  Abdominal:      General: There is no distension  Palpations: Abdomen is soft  There is no mass  Tenderness: There is no abdominal tenderness  There is no rebound     Musculoskeletal:         General: No tenderness  Feet:      Right foot:      Skin integrity: No warmth  Left foot:      Skin integrity: No warmth  Lymphadenopathy:      Cervical: No cervical adenopathy  Skin:     General: Skin is warm  Findings: No erythema or rash  Neurological:      Mental Status: She is alert and oriented to person, place, and time  Motor: No weakness  Gait: Gait normal          Patient's shoes and socks removed  Right Foot/Ankle   Right Foot Inspection  Skin Exam: skin intact no warmth and no pre-ulcer                          Toe Exam: no swelling and erythema  Sensory       Monofilament testing: intact  Vascular  Capillary refills: < 3 seconds  The right DP pulse is 2+  Left Foot/Ankle  Left Foot Inspection  Skin Exam: skin intactno warmth and no pre-ulcer                         Toe Exam: no swelling and no erythema                   Sensory       Monofilament: intact  Vascular  Capillary refills: < 3 seconds  The left DP pulse is 2+  Assign Risk Category:  No deformity present; No loss of protective sensation;  No weak pulses       Risk: 0    Assessment/Plan:    Type 2 diabetes mellitus without complication, without long-term current use of insulin (Formerly [facility])    Lab Results   Component Value Date    HGBA1C 6 2 (H) 01/30/2021      chronic asymptomatic fair control continue with the metformin 500 mg 1 tablet twice a day discussed the patient low carb diet important lose weight patient compliant with the medication encouraged to continue patient already on statin negative for microalbuminuria    Hypothyroidism   Chronic asymptomatic fair control continue with the levothyroxine 112 mcg once a day proper use discussed the patient    Essential hypertension   Chronic asymptomatic fair control with the low-salt diet encouraged patient to lose weight    Mixed hyperlipidemia   Chronic asymptomatic fair control continue atorvastatin 10 mg once a day low-fat diet discussed the patient       Diagnoses and all orders for this visit:    Type 2 diabetes mellitus without complication, without long-term current use of insulin (HCC)  -     Microalbumin / creatinine urine ratio; Future  -     Microalbumin / creatinine urine ratio  -     CBC and differential; Future  -     Hemoglobin A1C; Future    Acquired hypothyroidism  -     TSH, 3rd generation with Free T4 reflex; Future    Diabetic eye exam [facility])  -     Ambulatory referral to Ophthalmology; Future    Essential hypertension  -     Basic metabolic panel; Future    Mixed hyperlipidemia  -     Lipid panel; Future    Primary osteoarthritis of left knee  -     Diclofenac Sodium (VOLTAREN) 1 %;  Apply 4 g topically 4 (four) times a day    Other orders  -     Synvisc One 48 MG/6ML injection

## 2021-02-10 NOTE — ASSESSMENT & PLAN NOTE
Chronic asymptomatic fair control continue atorvastatin 10 mg once a day low-fat diet discussed the patient

## 2021-02-10 NOTE — ASSESSMENT & PLAN NOTE
Chronic asymptomatic fair control continue with the levothyroxine 112 mcg once a day proper use discussed the patient

## 2021-02-10 NOTE — ASSESSMENT & PLAN NOTE
Lab Results   Component Value Date    HGBA1C 6 2 (H) 01/30/2021      chronic asymptomatic fair control continue with the metformin 500 mg 1 tablet twice a day discussed the patient low carb diet important lose weight patient compliant with the medication encouraged to continue patient already on statin negative for microalbuminuria

## 2021-03-12 DIAGNOSIS — E11.9 TYPE 2 DIABETES MELLITUS WITHOUT COMPLICATION, WITHOUT LONG-TERM CURRENT USE OF INSULIN (HCC): ICD-10-CM

## 2021-03-12 DIAGNOSIS — E78.2 MIXED HYPERLIPIDEMIA: ICD-10-CM

## 2021-03-12 RX ORDER — ATORVASTATIN CALCIUM 10 MG/1
TABLET, FILM COATED ORAL
Qty: 30 TABLET | Refills: 2 | Status: SHIPPED | OUTPATIENT
Start: 2021-03-12 | End: 2021-05-10 | Stop reason: DRUGHIGH

## 2021-03-29 ENCOUNTER — IMMUNIZATIONS (OUTPATIENT)
Dept: FAMILY MEDICINE CLINIC | Facility: HOSPITAL | Age: 61
End: 2021-03-29

## 2021-03-29 DIAGNOSIS — Z23 ENCOUNTER FOR IMMUNIZATION: Primary | ICD-10-CM

## 2021-03-29 PROCEDURE — 91301 SARS-COV-2 / COVID-19 MRNA VACCINE (MODERNA) 100 MCG: CPT

## 2021-03-29 PROCEDURE — 0011A SARS-COV-2 / COVID-19 MRNA VACCINE (MODERNA) 100 MCG: CPT

## 2021-04-23 ENCOUNTER — TELEPHONE (OUTPATIENT)
Dept: FAMILY MEDICINE CLINIC | Facility: CLINIC | Age: 61
End: 2021-04-23

## 2021-04-30 ENCOUNTER — APPOINTMENT (OUTPATIENT)
Dept: LAB | Facility: HOSPITAL | Age: 61
End: 2021-04-30
Payer: COMMERCIAL

## 2021-04-30 ENCOUNTER — IMMUNIZATIONS (OUTPATIENT)
Dept: FAMILY MEDICINE CLINIC | Facility: HOSPITAL | Age: 61
End: 2021-04-30
Payer: COMMERCIAL

## 2021-04-30 DIAGNOSIS — Z23 ENCOUNTER FOR IMMUNIZATION: Primary | ICD-10-CM

## 2021-04-30 DIAGNOSIS — I10 ESSENTIAL HYPERTENSION: ICD-10-CM

## 2021-04-30 DIAGNOSIS — E11.9 TYPE 2 DIABETES MELLITUS WITHOUT COMPLICATION, WITHOUT LONG-TERM CURRENT USE OF INSULIN (HCC): ICD-10-CM

## 2021-04-30 DIAGNOSIS — E78.2 MIXED HYPERLIPIDEMIA: ICD-10-CM

## 2021-04-30 DIAGNOSIS — E03.9 ACQUIRED HYPOTHYROIDISM: ICD-10-CM

## 2021-04-30 LAB
ANION GAP SERPL CALCULATED.3IONS-SCNC: 8 MMOL/L (ref 4–13)
BASOPHILS # BLD AUTO: 0.02 THOUSANDS/ΜL (ref 0–0.1)
BASOPHILS NFR BLD AUTO: 0 % (ref 0–1)
BUN SERPL-MCNC: 16 MG/DL (ref 5–25)
CALCIUM SERPL-MCNC: 9.4 MG/DL (ref 8.3–10.1)
CHLORIDE SERPL-SCNC: 105 MMOL/L (ref 100–108)
CHOLEST SERPL-MCNC: 176 MG/DL (ref 50–200)
CO2 SERPL-SCNC: 28 MMOL/L (ref 21–32)
CREAT SERPL-MCNC: 0.74 MG/DL (ref 0.6–1.3)
EOSINOPHIL # BLD AUTO: 0.2 THOUSAND/ΜL (ref 0–0.61)
EOSINOPHIL NFR BLD AUTO: 4 % (ref 0–6)
ERYTHROCYTE [DISTWIDTH] IN BLOOD BY AUTOMATED COUNT: 12.3 % (ref 11.6–15.1)
EST. AVERAGE GLUCOSE BLD GHB EST-MCNC: 131 MG/DL
GFR SERPL CREATININE-BSD FRML MDRD: 87 ML/MIN/1.73SQ M
GLUCOSE P FAST SERPL-MCNC: 121 MG/DL (ref 65–99)
HBA1C MFR BLD: 6.2 %
HCT VFR BLD AUTO: 43.6 % (ref 34.8–46.1)
HDLC SERPL-MCNC: 65 MG/DL
HGB BLD-MCNC: 14.4 G/DL (ref 11.5–15.4)
IMM GRANULOCYTES # BLD AUTO: 0.01 THOUSAND/UL (ref 0–0.2)
IMM GRANULOCYTES NFR BLD AUTO: 0 % (ref 0–2)
LDLC SERPL CALC-MCNC: 96 MG/DL (ref 0–100)
LYMPHOCYTES # BLD AUTO: 1.85 THOUSANDS/ΜL (ref 0.6–4.47)
LYMPHOCYTES NFR BLD AUTO: 33 % (ref 14–44)
MCH RBC QN AUTO: 29.4 PG (ref 26.8–34.3)
MCHC RBC AUTO-ENTMCNC: 33 G/DL (ref 31.4–37.4)
MCV RBC AUTO: 89 FL (ref 82–98)
MONOCYTES # BLD AUTO: 0.37 THOUSAND/ΜL (ref 0.17–1.22)
MONOCYTES NFR BLD AUTO: 7 % (ref 4–12)
NEUTROPHILS # BLD AUTO: 3.1 THOUSANDS/ΜL (ref 1.85–7.62)
NEUTS SEG NFR BLD AUTO: 56 % (ref 43–75)
NONHDLC SERPL-MCNC: 111 MG/DL
NRBC BLD AUTO-RTO: 0 /100 WBCS
PLATELET # BLD AUTO: 215 THOUSANDS/UL (ref 149–390)
PMV BLD AUTO: 11.1 FL (ref 8.9–12.7)
POTASSIUM SERPL-SCNC: 4.1 MMOL/L (ref 3.5–5.3)
RBC # BLD AUTO: 4.9 MILLION/UL (ref 3.81–5.12)
SODIUM SERPL-SCNC: 141 MMOL/L (ref 136–145)
TRIGL SERPL-MCNC: 77 MG/DL
TSH SERPL DL<=0.05 MIU/L-ACNC: 1.86 UIU/ML (ref 0.36–3.74)
WBC # BLD AUTO: 5.55 THOUSAND/UL (ref 4.31–10.16)

## 2021-04-30 PROCEDURE — 80048 BASIC METABOLIC PNL TOTAL CA: CPT

## 2021-04-30 PROCEDURE — 36415 COLL VENOUS BLD VENIPUNCTURE: CPT

## 2021-04-30 PROCEDURE — 85025 COMPLETE CBC W/AUTO DIFF WBC: CPT

## 2021-04-30 PROCEDURE — 83036 HEMOGLOBIN GLYCOSYLATED A1C: CPT

## 2021-04-30 PROCEDURE — 80061 LIPID PANEL: CPT

## 2021-04-30 PROCEDURE — 84443 ASSAY THYROID STIM HORMONE: CPT

## 2021-05-10 ENCOUNTER — OFFICE VISIT (OUTPATIENT)
Dept: FAMILY MEDICINE CLINIC | Facility: CLINIC | Age: 61
End: 2021-05-10
Payer: COMMERCIAL

## 2021-05-10 VITALS
DIASTOLIC BLOOD PRESSURE: 70 MMHG | HEART RATE: 66 BPM | BODY MASS INDEX: 29.53 KG/M2 | RESPIRATION RATE: 16 BRPM | WEIGHT: 173 LBS | TEMPERATURE: 98.1 F | OXYGEN SATURATION: 98 % | HEIGHT: 64 IN | SYSTOLIC BLOOD PRESSURE: 122 MMHG

## 2021-05-10 DIAGNOSIS — E11.9 TYPE 2 DIABETES MELLITUS WITHOUT COMPLICATION, WITHOUT LONG-TERM CURRENT USE OF INSULIN (HCC): ICD-10-CM

## 2021-05-10 DIAGNOSIS — I83.892 VARICOSE VEINS OF LEFT LOWER EXTREMITY WITH OTHER COMPLICATIONS: ICD-10-CM

## 2021-05-10 DIAGNOSIS — I10 ESSENTIAL HYPERTENSION: Primary | ICD-10-CM

## 2021-05-10 DIAGNOSIS — E78.2 MIXED HYPERLIPIDEMIA: ICD-10-CM

## 2021-05-10 DIAGNOSIS — E03.9 ACQUIRED HYPOTHYROIDISM: ICD-10-CM

## 2021-05-10 DIAGNOSIS — I83.812 VARICOSE VEINS OF LEFT LOWER EXTREMITY WITH PAIN: ICD-10-CM

## 2021-05-10 PROCEDURE — 99214 OFFICE O/P EST MOD 30 MIN: CPT | Performed by: FAMILY MEDICINE

## 2021-05-10 PROCEDURE — 3008F BODY MASS INDEX DOCD: CPT | Performed by: FAMILY MEDICINE

## 2021-05-10 PROCEDURE — 3078F DIAST BP <80 MM HG: CPT | Performed by: FAMILY MEDICINE

## 2021-05-10 PROCEDURE — 1036F TOBACCO NON-USER: CPT | Performed by: FAMILY MEDICINE

## 2021-05-10 PROCEDURE — 3074F SYST BP LT 130 MM HG: CPT | Performed by: FAMILY MEDICINE

## 2021-05-10 RX ORDER — ATORVASTATIN CALCIUM 20 MG/1
20 TABLET, FILM COATED ORAL DAILY
Qty: 90 TABLET | Refills: 1 | Status: SHIPPED | OUTPATIENT
Start: 2021-05-10 | End: 2021-10-25

## 2021-05-10 RX ORDER — BLOOD SUGAR DIAGNOSTIC
STRIP MISCELLANEOUS
Qty: 50 STRIP | Refills: 0 | Status: SHIPPED | OUTPATIENT
Start: 2021-05-10 | End: 2021-05-31

## 2021-05-10 NOTE — PROGRESS NOTES
Subjective:   Chief Complaint   Patient presents with    Follow-up     chronic conditions        Patient ID: Osiel Mendez is a 57 y o  female  Patient here follow-up multiple with the chronic condition patient history of non-insulin-dependent diabetes on metformin 500 twice a day tolerated well without any a side effect the and patient compliant with the medication and try to be compliant with the low carb diet deny increased thirsty increased frequency urination does have headache and no abdomen pain patient was history of hyperlipidemia on atorvastatin tolerated well no rash or muscle pain and he try to control her blood pressure with the low-salt diet patient asymptomatic deny any chest pain short of breath no palpitation no dyspnea on exertion no lower extremity edema patient history of hypothyroidism on levothyroxine tolerate her medication a deny any  The heat or cold intolerance mood is stable recent blood work review with the patient      The following portions of the patient's history were reviewed and updated as appropriate: allergies, current medications, past family history, past medical history, past social history, past surgical history and problem list     Review of Systems   Constitutional: Negative for activity change, appetite change, chills, fatigue and fever  HENT: Negative for congestion, ear pain, sinus pressure, sinus pain and sore throat  Eyes: Negative for pain, discharge, redness, itching and visual disturbance  Respiratory: Negative for cough, chest tightness, shortness of breath and stridor  Cardiovascular: Negative for chest pain, palpitations and leg swelling  Gastrointestinal: Negative for abdominal pain, blood in stool, constipation, diarrhea, nausea and vomiting  Genitourinary: Negative for dysuria, flank pain, frequency and hematuria  Musculoskeletal: Negative for arthralgias, back pain, joint swelling and neck pain     Skin: Negative for color change, pallor and rash  Neurological: Negative for dizziness, tremors, seizures, syncope, weakness, numbness and headaches  Hematological: Does not bruise/bleed easily  All other systems reviewed and are negative  Objective:  Vitals:    05/10/21 1529   BP: 122/70   BP Location: Left arm   Patient Position: Sitting   Cuff Size: Large   Pulse: 66   Resp: 16   Temp: 98 1 °F (36 7 °C)   TempSrc: Tympanic   SpO2: 98%   Weight: 78 5 kg (173 lb)   Height: 5' 3 5" (1 613 m)      Physical Exam  Vitals signs and nursing note reviewed  Constitutional:       General: She is not in acute distress  Appearance: She is well-developed  She is not diaphoretic  HENT:      Head: Normocephalic  Right Ear: Tympanic membrane, ear canal and external ear normal       Left Ear: Tympanic membrane, ear canal and external ear normal       Nose: Nose normal  No congestion or rhinorrhea  Mouth/Throat:      Mouth: Mucous membranes are moist       Pharynx: Oropharynx is clear  No oropharyngeal exudate or posterior oropharyngeal erythema  Eyes:      General:         Right eye: No discharge  Left eye: No discharge  Conjunctiva/sclera: Conjunctivae normal       Pupils: Pupils are equal, round, and reactive to light  Neck:      Musculoskeletal: Normal range of motion and neck supple  Vascular: No JVD  Cardiovascular:      Rate and Rhythm: Normal rate and regular rhythm  Heart sounds: Normal heart sounds  No murmur  No gallop  Pulmonary:      Effort: Pulmonary effort is normal  No respiratory distress  Breath sounds: Normal breath sounds  No stridor  No wheezing or rales  Chest:      Chest wall: No tenderness  Abdominal:      General: There is no distension  Palpations: Abdomen is soft  There is no mass  Tenderness: There is no abdominal tenderness  There is no rebound  Musculoskeletal:         General: No tenderness     Lymphadenopathy:      Cervical: No cervical adenopathy  Skin:     General: Skin is warm  Findings: No erythema or rash  Neurological:      Mental Status: She is alert and oriented to person, place, and time  Motor: No weakness  Gait: Gait normal            Assessment/Plan:    Type 2 diabetes mellitus without complication, without long-term current use of insulin ([facility])    Lab Results   Component Value Date    HGBA1C 6 2 (H) 04/30/2021     Chronic asymptomatic fair control continue with the metformin 500 mg twice a day recommend continue current management low carb diet important lose weight discussed with the patient patient already on statin negative for microalbuminuria    Hypothyroidism   Chronic asymptomatic fair control continue with the levothyroxine 112 mcg continue current management    Varicose veins of left lower extremity with pain   A chronic symptomatic lose a pain with a longstanding recommend the to wear the compression stocking   He a elevate legs when possible    Essential hypertension   Chronic asymptomatic fair control continue low-salt diet    Mixed hyperlipidemia   A chronic asymptomatic LDL therapeutic in diabetic patient continue with atorvastatin 20 mg once a day       Diagnoses and all orders for this visit:    Essential hypertension    Acquired hypothyroidism  -     Hemoglobin A1C; Future  -     CBC and differential; Future  -     Basic metabolic panel; Future  -     Lipid Panel with Direct LDL reflex; Future  -     TSH, 3rd generation with Free T4 reflex; Future    Type 2 diabetes mellitus without complication, without long-term current use of insulin ([facility])  -     Hemoglobin A1C; Future  -     CBC and differential; Future  -     Basic metabolic panel; Future  -     Lipid Panel with Direct LDL reflex; Future  -     TSH, 3rd generation with Free T4 reflex; Future    Mixed hyperlipidemia  -     atorvastatin (LIPITOR) 20 mg tablet;  Take 1 tablet (20 mg total) by mouth daily  -     Hemoglobin A1C; Future  -     CBC and differential; Future  -     Basic metabolic panel; Future  -     Lipid Panel with Direct LDL reflex; Future  -     TSH, 3rd generation with Free T4 reflex;  Future    Varicose veins of left lower extremity with other complications  -     Compression Stocking    Varicose veins of left lower extremity with pain

## 2021-05-12 PROBLEM — I83.892 VARICOSE VEINS OF LEFT LOWER EXTREMITY WITH OTHER COMPLICATIONS: Status: RESOLVED | Noted: 2021-05-10 | Resolved: 2021-05-12

## 2021-05-12 NOTE — ASSESSMENT & PLAN NOTE
A chronic symptomatic lose a pain with a longstanding recommend the to wear the compression stocking   He a elevate legs when possible

## 2021-05-12 NOTE — ASSESSMENT & PLAN NOTE
A chronic asymptomatic LDL therapeutic in diabetic patient continue with atorvastatin 20 mg once a day

## 2021-05-12 NOTE — ASSESSMENT & PLAN NOTE
Lab Results   Component Value Date    HGBA1C 6 2 (H) 04/30/2021     Chronic asymptomatic fair control continue with the metformin 500 mg twice a day recommend continue current management low carb diet important lose weight discussed with the patient patient already on statin negative for microalbuminuria

## 2021-05-12 NOTE — ASSESSMENT & PLAN NOTE
Chronic asymptomatic fair control continue with the levothyroxine 112 mcg continue current management

## 2021-05-30 DIAGNOSIS — E11.9 TYPE 2 DIABETES MELLITUS WITHOUT COMPLICATION, WITHOUT LONG-TERM CURRENT USE OF INSULIN (HCC): ICD-10-CM

## 2021-05-31 RX ORDER — BLOOD SUGAR DIAGNOSTIC
STRIP MISCELLANEOUS
Qty: 50 STRIP | Refills: 0 | Status: SHIPPED | OUTPATIENT
Start: 2021-05-31 | End: 2021-06-19

## 2021-06-02 DIAGNOSIS — E03.9 ACQUIRED HYPOTHYROIDISM: ICD-10-CM

## 2021-06-02 DIAGNOSIS — E11.9 TYPE 2 DIABETES MELLITUS WITHOUT COMPLICATION, WITHOUT LONG-TERM CURRENT USE OF INSULIN (HCC): ICD-10-CM

## 2021-06-02 RX ORDER — LEVOTHYROXINE SODIUM 112 UG/1
112 TABLET ORAL
Qty: 30 TABLET | Refills: 5 | Status: SHIPPED | OUTPATIENT
Start: 2021-06-02 | End: 2021-11-15

## 2021-06-10 ENCOUNTER — TELEPHONE (OUTPATIENT)
Dept: FAMILY MEDICINE CLINIC | Facility: CLINIC | Age: 61
End: 2021-06-10

## 2021-06-19 DIAGNOSIS — E11.9 TYPE 2 DIABETES MELLITUS WITHOUT COMPLICATION, WITHOUT LONG-TERM CURRENT USE OF INSULIN (HCC): ICD-10-CM

## 2021-06-19 RX ORDER — BLOOD SUGAR DIAGNOSTIC
STRIP MISCELLANEOUS
Qty: 50 STRIP | Refills: 0 | Status: SHIPPED | OUTPATIENT
Start: 2021-06-19 | End: 2021-07-12

## 2021-07-11 ENCOUNTER — HOSPITAL ENCOUNTER (EMERGENCY)
Facility: HOSPITAL | Age: 61
Discharge: HOME | End: 2021-07-11
Attending: EMERGENCY MEDICINE | Admitting: EMERGENCY MEDICINE
Payer: COMMERCIAL

## 2021-07-11 ENCOUNTER — APPOINTMENT (EMERGENCY)
Dept: CT IMAGING | Facility: HOSPITAL | Age: 61
End: 2021-07-11
Payer: COMMERCIAL

## 2021-07-11 ENCOUNTER — APPOINTMENT (EMERGENCY)
Dept: RADIOLOGY | Facility: HOSPITAL | Age: 61
End: 2021-07-11
Payer: COMMERCIAL

## 2021-07-11 VITALS
HEART RATE: 62 BPM | TEMPERATURE: 98 F | SYSTOLIC BLOOD PRESSURE: 160 MMHG | OXYGEN SATURATION: 99 % | RESPIRATION RATE: 17 BRPM | DIASTOLIC BLOOD PRESSURE: 67 MMHG

## 2021-07-11 DIAGNOSIS — E11.9 TYPE 2 DIABETES MELLITUS WITHOUT COMPLICATION, WITHOUT LONG-TERM CURRENT USE OF INSULIN (HCC): ICD-10-CM

## 2021-07-11 DIAGNOSIS — M62.838 TRAPEZIUS MUSCLE SPASM: ICD-10-CM

## 2021-07-11 DIAGNOSIS — M54.9 UPPER BACK PAIN ON RIGHT SIDE: Primary | ICD-10-CM

## 2021-07-11 LAB
ALBUMIN SERPL BCP-MCNC: 3.7 G/DL (ref 3.5–5)
ALP SERPL-CCNC: 101 U/L (ref 46–116)
ALT SERPL W P-5'-P-CCNC: 35 U/L (ref 12–78)
ANION GAP SERPL CALCULATED.3IONS-SCNC: 8 MMOL/L (ref 4–13)
AST SERPL W P-5'-P-CCNC: 18 U/L (ref 5–45)
ATRIAL RATE: 68 BPM
BASOPHILS # BLD AUTO: 0.03 THOUSANDS/ΜL (ref 0–0.1)
BASOPHILS NFR BLD AUTO: 1 % (ref 0–1)
BILIRUB DIRECT SERPL-MCNC: 0.16 MG/DL (ref 0–0.2)
BILIRUB SERPL-MCNC: 0.66 MG/DL (ref 0.2–1)
BUN SERPL-MCNC: 16 MG/DL (ref 5–25)
CALCIUM SERPL-MCNC: 9.1 MG/DL (ref 8.3–10.1)
CHLORIDE SERPL-SCNC: 108 MMOL/L (ref 100–108)
CO2 SERPL-SCNC: 28 MMOL/L (ref 21–32)
CREAT SERPL-MCNC: 0.81 MG/DL (ref 0.6–1.3)
EOSINOPHIL # BLD AUTO: 0.17 THOUSAND/ΜL (ref 0–0.61)
EOSINOPHIL NFR BLD AUTO: 3 % (ref 0–6)
ERYTHROCYTE [DISTWIDTH] IN BLOOD BY AUTOMATED COUNT: 12.1 % (ref 11.6–15.1)
GFR SERPL CREATININE-BSD FRML MDRD: 78 ML/MIN/1.73SQ M
GLUCOSE SERPL-MCNC: 134 MG/DL (ref 65–140)
HCT VFR BLD AUTO: 41.9 % (ref 34.8–46.1)
HGB BLD-MCNC: 14.4 G/DL (ref 11.5–15.4)
IMM GRANULOCYTES # BLD AUTO: 0.01 THOUSAND/UL (ref 0–0.2)
IMM GRANULOCYTES NFR BLD AUTO: 0 % (ref 0–2)
LIPASE SERPL-CCNC: 318 U/L (ref 73–393)
LYMPHOCYTES # BLD AUTO: 1.56 THOUSANDS/ΜL (ref 0.6–4.47)
LYMPHOCYTES NFR BLD AUTO: 28 % (ref 14–44)
MCH RBC QN AUTO: 30.2 PG (ref 26.8–34.3)
MCHC RBC AUTO-ENTMCNC: 34.4 G/DL (ref 31.4–37.4)
MCV RBC AUTO: 88 FL (ref 82–98)
MONOCYTES # BLD AUTO: 0.33 THOUSAND/ΜL (ref 0.17–1.22)
MONOCYTES NFR BLD AUTO: 6 % (ref 4–12)
NEUTROPHILS # BLD AUTO: 3.51 THOUSANDS/ΜL (ref 1.85–7.62)
NEUTS SEG NFR BLD AUTO: 62 % (ref 43–75)
NRBC BLD AUTO-RTO: 0 /100 WBCS
P AXIS: 20 DEGREES
PLATELET # BLD AUTO: 214 THOUSANDS/UL (ref 149–390)
PMV BLD AUTO: 10.9 FL (ref 8.9–12.7)
POTASSIUM SERPL-SCNC: 3.8 MMOL/L (ref 3.5–5.3)
PR INTERVAL: 162 MS
PROT SERPL-MCNC: 7.3 G/DL (ref 6.4–8.2)
QRS AXIS: -2 DEGREES
QRSD INTERVAL: 84 MS
QT INTERVAL: 384 MS
QTC INTERVAL: 408 MS
RBC # BLD AUTO: 4.77 MILLION/UL (ref 3.81–5.12)
SODIUM SERPL-SCNC: 144 MMOL/L (ref 136–145)
T WAVE AXIS: 50 DEGREES
TROPONIN I SERPL-MCNC: <0.02 NG/ML
VENTRICULAR RATE: 68 BPM
WBC # BLD AUTO: 5.61 THOUSAND/UL (ref 4.31–10.16)

## 2021-07-11 PROCEDURE — 99285 EMERGENCY DEPT VISIT HI MDM: CPT | Performed by: PHYSICIAN ASSISTANT

## 2021-07-11 PROCEDURE — 84484 ASSAY OF TROPONIN QUANT: CPT | Performed by: PHYSICIAN ASSISTANT

## 2021-07-11 PROCEDURE — 71046 X-RAY EXAM CHEST 2 VIEWS: CPT

## 2021-07-11 PROCEDURE — 80076 HEPATIC FUNCTION PANEL: CPT | Performed by: PHYSICIAN ASSISTANT

## 2021-07-11 PROCEDURE — 93005 ELECTROCARDIOGRAM TRACING: CPT

## 2021-07-11 PROCEDURE — 36415 COLL VENOUS BLD VENIPUNCTURE: CPT | Performed by: PHYSICIAN ASSISTANT

## 2021-07-11 PROCEDURE — 96374 THER/PROPH/DIAG INJ IV PUSH: CPT

## 2021-07-11 PROCEDURE — 71275 CT ANGIOGRAPHY CHEST: CPT

## 2021-07-11 PROCEDURE — 93010 ELECTROCARDIOGRAM REPORT: CPT | Performed by: INTERNAL MEDICINE

## 2021-07-11 PROCEDURE — 85025 COMPLETE CBC W/AUTO DIFF WBC: CPT | Performed by: PHYSICIAN ASSISTANT

## 2021-07-11 PROCEDURE — 83690 ASSAY OF LIPASE: CPT | Performed by: PHYSICIAN ASSISTANT

## 2021-07-11 PROCEDURE — 99284 EMERGENCY DEPT VISIT MOD MDM: CPT

## 2021-07-11 PROCEDURE — 74174 CTA ABD&PLVS W/CONTRAST: CPT

## 2021-07-11 PROCEDURE — 80048 BASIC METABOLIC PNL TOTAL CA: CPT | Performed by: PHYSICIAN ASSISTANT

## 2021-07-11 RX ORDER — CYCLOBENZAPRINE HCL 10 MG
10 TABLET ORAL 2 TIMES DAILY PRN
Qty: 20 TABLET | Refills: 0 | Status: SHIPPED | OUTPATIENT
Start: 2021-07-11 | End: 2021-08-11 | Stop reason: ALTCHOICE

## 2021-07-11 RX ORDER — OXYCODONE HYDROCHLORIDE AND ACETAMINOPHEN 5; 325 MG/1; MG/1
1 TABLET ORAL ONCE
Status: COMPLETED | OUTPATIENT
Start: 2021-07-11 | End: 2021-07-11

## 2021-07-11 RX ORDER — LIDOCAINE 50 MG/G
1 PATCH TOPICAL ONCE
Status: DISCONTINUED | OUTPATIENT
Start: 2021-07-11 | End: 2021-07-11 | Stop reason: HOSPADM

## 2021-07-11 RX ORDER — KETOROLAC TROMETHAMINE 30 MG/ML
15 INJECTION, SOLUTION INTRAMUSCULAR; INTRAVENOUS ONCE
Status: COMPLETED | OUTPATIENT
Start: 2021-07-11 | End: 2021-07-11

## 2021-07-11 RX ADMIN — OXYCODONE HYDROCHLORIDE AND ACETAMINOPHEN 1 TABLET: 5; 325 TABLET ORAL at 11:38

## 2021-07-11 RX ADMIN — LIDOCAINE 1 PATCH: 50 PATCH TOPICAL at 09:47

## 2021-07-11 RX ADMIN — KETOROLAC TROMETHAMINE 15 MG: 30 INJECTION, SOLUTION INTRAMUSCULAR; INTRAVENOUS at 10:26

## 2021-07-11 RX ADMIN — IOHEXOL 100 ML: 350 INJECTION, SOLUTION INTRAVENOUS at 11:03

## 2021-07-11 NOTE — ED PROVIDER NOTES
History  Chief Complaint   Patient presents with    Back Pain     Right upper back pain at right scapula that radiates to her chest intermittently since Friday  Patient is a 69-year-old female past medical history of hypertension, hyperlipidemia, diabetes who presents with right upper back and shoulder pain for 3 days  Patient describes a constant aching pain that starts in her thoracic back and posterior shoulder and radiates into her right upper arm  She intermittently notes the aching on her right anterior chest just adjacent to her shoulder  She states the pain is intermittently worse with movement, though not all the time  She denies any specific injury to the area, numbness, tingling, weakness of the arm, swelling of the shoulder or arm, chest pain, palpitations, shortness of breath, neck pain or stiffness, previous similar symptoms  She went to urgent care yesterday, which time she was prescribed ibuprofen and Robaxin  She has been taking medications as prescribed  She states that ibuprofen provides some relief, but the Robaxin does not  Patient states she is otherwise in her usual state of health denies any fevers, chills, diaphoresis, headaches, dizziness, lightheadedness, congestion, cough, abdominal pain, nausea, vomiting, diarrhea, urinary changes, rash  Prior to Admission Medications   Prescriptions Last Dose Informant Patient Reported? Taking?    Diclofenac Sodium (VOLTAREN) 1 % Not Taking at Unknown time Self No No   Sig: Apply 4 g topically 4 (four) times a day   Patient not taking: Reported on 7/11/2021   Lancets (ONETOUCH ULTRASOFT) lancets 7/11/2021 at Unknown time Self No Yes   Sig: One touch- tests bid #100 with 2 refills   OneTouch Verio test strip 7/11/2021 at Unknown time  No Yes   Sig: TEST 2 (TWO) TIMES A DAY   atorvastatin (LIPITOR) 20 mg tablet 7/10/2021 at 1700  No Yes   Sig: Take 1 tablet (20 mg total) by mouth daily   levothyroxine 112 mcg tablet 7/10/2021 at 0900 No Yes   Sig: TAKE 1 TABLET (112 MCG TOTAL) BY MOUTH DAILY IN THE EARLY MORNING   metFORMIN (GLUCOPHAGE) 500 mg tablet 7/10/2021 at 1700  No Yes   Sig: TAKE 1 TABLET BY MOUTH EVERY DAY      Facility-Administered Medications: None       Past Medical History:   Diagnosis Date    Anxiety 3/29/2019    Asthma     Cataract     viral    Chronic kidney disease     Hyperlipidemia     Hypertension     Hypothyroid     IFG (impaired fasting glucose) 2019    Incisional hernia     Insomnia     Kidney stone     in past    Osteoarthritis     RA (rheumatoid arthritis) ([facility] ) 2015    pt unsure     Type 2 diabetes mellitus without complication, without long-term current use of insulin ([facility] ) 2019    NIDDM    Uses Uzbek as primary spoken language     Varicose veins of both lower extremities     Wears dentures      upper partials    Wears glasses        Past Surgical History:   Procedure Laterality Date    APPENDECTOMY      BREAST BIOPSY Right     negative     SECTION      x 3    CHOLECYSTECTOMY      COLONOSCOPY      HERNIA REPAIR      HYSTERECTOMY      SC LAP, INCISIONAL HERNIA REPAIR,REDUCIBLE N/A 2019    Procedure: REPAIR HERNIA INCISIONAL LAPAROSCOPIC W/ ROBOTICS;  Surgeon: Benjamin Delatorre MD;  Location: [facility];  Service: General       Family History   Problem Relation Age of Onset    Emphysema Mother     No Known Problems Sister     No Known Problems Daughter     No Known Problems Sister     No Known Problems Sister     No Known Problems Sister     No Known Problems Paternal Aunt     No Known Problems Maternal Aunt     No Known Problems Maternal Aunt      I have reviewed and agree with the history as documented      E-Cigarette/Vaping    E-Cigarette Use Never User      E-Cigarette/Vaping Substances    Nicotine No     THC No     CBD No     Flavoring No     Other No     Unknown No      Social History     Tobacco Use    Smoking status: Never Smoker    Smokeless tobacco: Never Used    Tobacco comment: no passive smoke exposure   Vaping Use    Vaping Use: Never used   Substance Use Topics    Alcohol use: Yes     Comment: liquor    Drug use: No       Review of Systems   Constitutional: Negative for chills, diaphoresis and fever  HENT: Negative for congestion and sore throat  Eyes: Negative for visual disturbance  Respiratory: Negative for cough, shortness of breath, wheezing and stridor  Cardiovascular: Positive for chest pain  Negative for palpitations and leg swelling  Gastrointestinal: Negative for abdominal pain, diarrhea, nausea and vomiting  Genitourinary: Negative for difficulty urinating, dysuria and hematuria  Musculoskeletal: Positive for arthralgias and back pain  Negative for myalgias, neck pain and neck stiffness  Skin: Negative for color change, pallor and rash  Neurological: Negative for dizziness, weakness, light-headedness, numbness and headaches  All other systems reviewed and are negative  Physical Exam  Physical Exam  Vitals and nursing note reviewed  Exam conducted with a chaperone present  Constitutional:       General: She is awake  She is not in acute distress  Appearance: She is well-developed  She is not toxic-appearing or diaphoretic  HENT:      Head: Normocephalic and atraumatic  Right Ear: Hearing and external ear normal       Left Ear: Hearing and external ear normal       Nose: Nose normal    Eyes:      Conjunctiva/sclera: Conjunctivae normal       Pupils: Pupils are equal, round, and reactive to light  Cardiovascular:      Rate and Rhythm: Normal rate and regular rhythm  Pulses: Normal pulses  Radial pulses are 2+ on the right side and 2+ on the left side  Heart sounds: Normal heart sounds, S1 normal and S2 normal    Pulmonary:      Effort: Pulmonary effort is normal  No respiratory distress  Breath sounds: Normal breath sounds  No stridor   No decreased breath sounds or wheezing  Chest:      Chest wall: No lacerations, deformity, swelling, tenderness, crepitus or edema  Abdominal:      General: Bowel sounds are normal  There is no distension  Palpations: Abdomen is soft  Tenderness: There is no abdominal tenderness  Musculoskeletal:         General: Normal range of motion  Right shoulder: Tenderness present  No swelling, deformity, effusion, laceration, bony tenderness or crepitus  Normal range of motion  Normal strength  Normal pulse  Right upper arm: Tenderness present  No swelling, edema, deformity, lacerations or bony tenderness  Right elbow: Normal       Right forearm: Normal       Right wrist: Normal       Right hand: Normal         Arms:       Cervical back: Normal range of motion and neck supple  Spasms and tenderness present  No swelling, edema, deformity, erythema, lacerations, bony tenderness or crepitus  No pain with movement, spinous process tenderness or muscular tenderness  Normal range of motion  Thoracic back: Spasms and tenderness present  No swelling, deformity, lacerations or bony tenderness  Back:       Comments: Neurovascularly intact, 5/5 strength in bilateral upper and lower extremities  Patient ambulating without issue  Patient with tenderness to palpation along right trapezius muscle muscle spasm noted  Tenderness also to palpation diffusely over right posterior and anterior shoulder into proximal right upper arm  No decreased ROM of right shoulder or cervical neck  Pain with movement of the right arm  No midline tenderness, step-off deformity, swelling, erythema, skin changes, crepitus, fior deformity noted  Skin:     General: Skin is warm and dry  Capillary Refill: Capillary refill takes less than 2 seconds  Neurological:      General: No focal deficit present  Mental Status: She is alert and oriented to person, place, and time  GCS: GCS eye subscore is 4   GCS verbal subscore is 5  GCS motor subscore is 6  Psychiatric:         Behavior: Behavior is cooperative           Vital Signs  ED Triage Vitals [07/11/21 0845]   Temperature Pulse Respirations Blood Pressure SpO2   98 °F (36 7 °C) 74 16 163/78 97 %      Temp Source Heart Rate Source Patient Position - Orthostatic VS BP Location FiO2 (%)   Oral Monitor Sitting Right arm --      Pain Score       9           Vitals:    07/11/21 0925 07/11/21 0926 07/11/21 1136 07/11/21 1200   BP: 153/83 151/77 154/74 160/67   Pulse: 64 64 64 62   Patient Position - Orthostatic VS: Sitting Sitting Sitting Sitting         Visual Acuity      ED Medications  Medications   ketorolac (TORADOL) injection 15 mg (15 mg Intravenous Given 7/11/21 1026)   iohexol (OMNIPAQUE) 350 MG/ML injection (SINGLE-DOSE) 100 mL (100 mL Intravenous Given 7/11/21 1103)   oxyCODONE-acetaminophen (PERCOCET) 5-325 mg per tablet 1 tablet (1 tablet Oral Given 7/11/21 1138)       Diagnostic Studies  Results Reviewed     Procedure Component Value Units Date/Time    Lipase [[id number]]  (Normal) Collected: 07/11/21 0934    Lab Status: Final result Specimen: Blood from Arm, Right Updated: 07/11/21 1003     Lipase 318 u/L     Basic metabolic panel [[id number]] Collected: 07/11/21 0934    Lab Status: Final result Specimen: Blood from Arm, Right Updated: 07/11/21 1003     Sodium 144 mmol/L      Potassium 3 8 mmol/L      Chloride 108 mmol/L      CO2 28 mmol/L      ANION GAP 8 mmol/L      BUN 16 mg/dL      Creatinine 0 81 mg/dL      Glucose 134 mg/dL      Calcium 9 1 mg/dL      eGFR 78 ml/min/1 73sq m     Narrative:      [place] guidelines for Chronic Kidney Disease (CKD):     Stage 1 with normal or high GFR (GFR > 90 mL/min/1 73 square meters)    Stage 2 Mild CKD (GFR = 60-89 mL/min/1 73 square meters)    Stage 3A Moderate CKD (GFR = 45-59 mL/min/1 73 square meters)    Stage 3B Moderate CKD (GFR = 30-44 mL/min/1 73 square meters)    Stage 4 Severe CKD (GFR = 15-29 mL/min/1 73 square meters)    Stage 5 End Stage CKD (GFR <15 mL/min/1 73 square meters)  Note: GFR calculation is accurate only with a steady state creatinine    Hepatic function panel [[id number]]  (Normal) Collected: 07/11/21 0934    Lab Status: Final result Specimen: Blood from Arm, Right Updated: 07/11/21 1003     Total Bilirubin 0 66 mg/dL      Bilirubin, Direct 0 16 mg/dL      Alkaline Phosphatase 101 U/L      AST 18 U/L      ALT 35 U/L      Total Protein 7 3 g/dL      Albumin 3 7 g/dL     Troponin I [[id number]]  (Normal) Collected: 07/11/21 0934    Lab Status: Final result Specimen: Blood from Arm, Right Updated: 07/11/21 1003     Troponin I <0 02 ng/mL     CBC and differential [[id number]] Collected: 07/11/21 0934    Lab Status: Final result Specimen: Blood from Arm, Right Updated: 07/11/21 0939     WBC 5 61 Thousand/uL      RBC 4 77 Million/uL      Hemoglobin 14 4 g/dL      Hematocrit 41 9 %      MCV 88 fL      MCH 30 2 pg      MCHC 34 4 g/dL      RDW 12 1 %      MPV 10 9 fL      Platelets 212 Thousands/uL      nRBC 0 /100 WBCs      Neutrophils Relative 62 %      Immat GRANS % 0 %      Lymphocytes Relative 28 %      Monocytes Relative 6 %      Eosinophils Relative 3 %      Basophils Relative 1 %      Neutrophils Absolute 3 51 Thousands/µL      Immature Grans Absolute 0 01 Thousand/uL      Lymphocytes Absolute 1 56 Thousands/µL      Monocytes Absolute 0 33 Thousand/µL      Eosinophils Absolute 0 17 Thousand/µL      Basophils Absolute 0 03 Thousands/µL                  CTA dissection protocol chest abdomen pelvis w wo contrast   Final Result by Kelvin Ruvalcaba MD (07/11 1114)         1  Normal thoracoabdominal aorta without evidence for aneurysmal dilatation and/or dissection  2   No acute abnormality in the chest, abdomen, or pelvis                    Workstation performed: [id number]         XR chest 2 views    (Results Pending)              Procedures  ECG 12 Lead Documentation Only    Date/Time: 7/11/2021 8:59 AM  Performed by: Marcela Mead PA-C  Authorized by: Marcela Mead PA-C     Indications / Diagnosis:  Chest pain  ECG reviewed by me, the ED Provider: yes    Patient location:  ED  Previous ECG:     Previous ECG:  Unavailable  Rate:     ECG rate:  68    ECG rate assessment: normal    Rhythm:     Rhythm: sinus rhythm    Ectopy:     Ectopy: none    QRS:     QRS axis:  Normal  Conduction:     Conduction: normal    ST segments:     ST segments:  Normal  T waves:     T waves: normal               ED Course  ED Course as of Jul 11 1556   Sun Jul 11, 2021   1049 Patient noted some improvement of pain after toradol  Reviewed all results with patient, answered questions  Patient is agreeable to plan  1130 IMPRESSION:        1  Normal thoracoabdominal aorta without evidence for aneurysmal dilatation and/or dissection  2   No acute abnormality in the chest, abdomen, or pelvis  CTA dissection protocol chest abdomen pelvis w wo contrast   1148 Reviewed all results with patient, answered questions  Patient states her pain improves after each is a medication, but then returns  Patient is stable for discharge  SBIRT 22yo+      Most Recent Value   SBIRT (24 yo +)   In order to provide better care to our patients, we are screening all of our patients for alcohol and drug use  Would it be okay to ask you these screening questions? Yes Filed at: 07/11/2021 0853   Initial Alcohol Screen: US AUDIT-C    1  How often do you have a drink containing alcohol?  0 Filed at: 07/11/2021 0853   2  How many drinks containing alcohol do you have on a typical day you are drinking? 0 Filed at: 07/11/2021 0853   3a  Male UNDER 65: How often do you have five or more drinks on one occasion? 0 Filed at: 07/11/2021 0853   3b  FEMALE Any Age, or MALE 65+: How often do you have 4 or more drinks on one occassion?   0 Filed at: 07/11/2021 0853   Audit-C Score  0 Filed at: 07/11/2021 2046   ISH: How many times in the past year have you    Used an illegal drug or used a prescription medication for non-medical reasons? Never Filed at: 07/11/2021 0853                    [facility]  Number of Diagnoses or Management Options  Trapezius muscle spasm  Upper back pain on right side  Diagnosis management comments: Reviewed all results with patient, answered questions  Patient noted improvement of pain after ED pain medications, but states that it comes back, and she is worried is going to come back later  Extensive discussion with patient regarding likely etiology of patient's symptoms, treatment at home  Reviewed medication education, treatment at home  Recommended follow-up with Ortho for further evaluation of symptoms  Recommended follow-up with PCP for monitoring of symptoms  The management plan was discussed in detail with the patient at bedside and all questions were answered  Provided both verbal and written instructions  Reviewed red flag symptoms and strict return to ED instructions  Patient notes understanding and agrees to plan  Patient's family member at bedside provided translation as needed and they declined formal [place]ian translation services  Disposition  Final diagnoses:   Upper back pain on right side   Trapezius muscle spasm     Time reflects when diagnosis was documented in both MDM as applicable and the Disposition within this note     Time User Action Codes Description Comment    7/11/2021 11:49 AM Loren Jackson Add [M54 9] Upper back pain on right side     7/11/2021 11:49 AM Eduarda Bautista Add [U22 195] Trapezius muscle spasm       ED Disposition     ED Disposition Condition Date/Time Comment    Discharge Stable Sun Jul 11, 2021 11:49 AM Teddy Mills discharge to home/self care              Follow-up Information     Follow up With Specialties Details Why Contact Info Additional [address] [facility] [address] Emergency Department Emergency Medicine  If symptoms worsen [facility] [id number]  [address] [facility] Emergency Department, [address]  , [place], [address] [facility], [address] [place]  Orthopedic Surgery Schedule an appointment as soon as possible for a visit   [address]  [place] [address] [facility] [address] [phone] [address], [address], [facility], [place], [address] [facility], [address] [phone]    Catalina Valverde MD Family Medicine  As needed [address]  [address] [facility]             Discharge Medication List as of 7/11/2021 11:52 AM      START taking these medications    Details   cyclobenzaprine (FLEXERIL) 10 mg tablet Take 1 tablet (10 mg total) by mouth 2 (two) times a day as needed for muscle spasms, Starting Sun 7/11/2021, Normal         CONTINUE these medications which have NOT CHANGED    Details   atorvastatin (LIPITOR) 20 mg tablet Take 1 tablet (20 mg total) by mouth daily, Starting Mon 5/10/2021, Normal      Lancets (ONETOUCH ULTRASOFT) lancets One touch- tests bid #100 with 2 refills, Normal      levothyroxine 112 mcg tablet TAKE 1 TABLET (112 MCG TOTAL) BY MOUTH DAILY IN THE EARLY MORNING, Starting Wed 6/2/2021, Normal      metFORMIN (GLUCOPHAGE) 500 mg tablet TAKE 1 TABLET BY MOUTH EVERY DAY, Normal      OneTouch Verio test strip TEST 2 (TWO) TIMES A DAY, Normal      Diclofenac Sodium (VOLTAREN) 1 % Apply 4 g topically 4 (four) times a day, Starting Mon 2/8/2021, Normal           No discharge procedures on file      PDMP Review     None          ED Provider  Electronically Signed by           Keyla Nj PA-C  07/11/21 0185

## 2021-07-11 NOTE — DISCHARGE INSTRUCTIONS
Stop taking Robaxin  Take Flexeril as prescribed as needed for pain  Do not drive or operate heavy machinery while taking medication  Continue previously prescribed ibuprofen as needed for pain  Remove Lidoderm patch 12 hours after application    After that can try over-the-counter pain patches such as Salonpas as needed for pain  Continue Tylenol at home as needed for pain  Follow-up with PCP for monitoring of symptoms  Follow-up with Ortho for further evaluation of persistent symptoms  Return to ED if symptoms worsen including increasing pain, numbness, tingling, weakness of the arm, chest pain, difficulty breathing

## 2021-07-11 NOTE — ED NOTES
Pt reports upper right back pain that started after work on Friday  Pain "goes through inside of body", and radiates down right arm and into right chest   Pain worse with palpation  No specific injury reported         Jerel Yuen RN  07/11/21 2026

## 2021-07-12 RX ORDER — BLOOD SUGAR DIAGNOSTIC
STRIP MISCELLANEOUS
Qty: 50 STRIP | Refills: 0 | Status: SHIPPED | OUTPATIENT
Start: 2021-07-12 | End: 2021-08-01

## 2021-07-31 DIAGNOSIS — E11.9 TYPE 2 DIABETES MELLITUS WITHOUT COMPLICATION, WITHOUT LONG-TERM CURRENT USE OF INSULIN (HCC): ICD-10-CM

## 2021-08-01 RX ORDER — BLOOD SUGAR DIAGNOSTIC
STRIP MISCELLANEOUS
Qty: 50 STRIP | Refills: 0 | Status: SHIPPED | OUTPATIENT
Start: 2021-08-01 | End: 2021-08-23

## 2021-08-07 ENCOUNTER — APPOINTMENT (OUTPATIENT)
Dept: LAB | Facility: HOSPITAL | Age: 61
End: 2021-08-07
Payer: COMMERCIAL

## 2021-08-07 DIAGNOSIS — E78.2 MIXED HYPERLIPIDEMIA: ICD-10-CM

## 2021-08-07 DIAGNOSIS — E11.9 TYPE 2 DIABETES MELLITUS WITHOUT COMPLICATION, WITHOUT LONG-TERM CURRENT USE OF INSULIN (HCC): ICD-10-CM

## 2021-08-07 DIAGNOSIS — E03.9 ACQUIRED HYPOTHYROIDISM: ICD-10-CM

## 2021-08-07 LAB
ANION GAP SERPL CALCULATED.3IONS-SCNC: 9 MMOL/L (ref 4–13)
BASOPHILS # BLD AUTO: 0.03 THOUSANDS/ΜL (ref 0–0.1)
BASOPHILS NFR BLD AUTO: 1 % (ref 0–1)
BUN SERPL-MCNC: 16 MG/DL (ref 5–25)
CALCIUM SERPL-MCNC: 8.8 MG/DL (ref 8.3–10.1)
CHLORIDE SERPL-SCNC: 106 MMOL/L (ref 100–108)
CHOLEST SERPL-MCNC: 145 MG/DL (ref 50–200)
CO2 SERPL-SCNC: 27 MMOL/L (ref 21–32)
CREAT SERPL-MCNC: 0.77 MG/DL (ref 0.6–1.3)
EOSINOPHIL # BLD AUTO: 0.17 THOUSAND/ΜL (ref 0–0.61)
EOSINOPHIL NFR BLD AUTO: 3 % (ref 0–6)
ERYTHROCYTE [DISTWIDTH] IN BLOOD BY AUTOMATED COUNT: 12.4 % (ref 11.6–15.1)
EST. AVERAGE GLUCOSE BLD GHB EST-MCNC: 131 MG/DL
GFR SERPL CREATININE-BSD FRML MDRD: 83 ML/MIN/1.73SQ M
GLUCOSE P FAST SERPL-MCNC: 119 MG/DL (ref 65–99)
HBA1C MFR BLD: 6.2 %
HCT VFR BLD AUTO: 42.2 % (ref 34.8–46.1)
HDLC SERPL-MCNC: 52 MG/DL
HGB BLD-MCNC: 13.9 G/DL (ref 11.5–15.4)
IMM GRANULOCYTES # BLD AUTO: 0.01 THOUSAND/UL (ref 0–0.2)
IMM GRANULOCYTES NFR BLD AUTO: 0 % (ref 0–2)
LDLC SERPL CALC-MCNC: 77 MG/DL (ref 0–100)
LYMPHOCYTES # BLD AUTO: 1.83 THOUSANDS/ΜL (ref 0.6–4.47)
LYMPHOCYTES NFR BLD AUTO: 32 % (ref 14–44)
MCH RBC QN AUTO: 29.1 PG (ref 26.8–34.3)
MCHC RBC AUTO-ENTMCNC: 32.9 G/DL (ref 31.4–37.4)
MCV RBC AUTO: 88 FL (ref 82–98)
MONOCYTES # BLD AUTO: 0.48 THOUSAND/ΜL (ref 0.17–1.22)
MONOCYTES NFR BLD AUTO: 9 % (ref 4–12)
NEUTROPHILS # BLD AUTO: 3.12 THOUSANDS/ΜL (ref 1.85–7.62)
NEUTS SEG NFR BLD AUTO: 55 % (ref 43–75)
NRBC BLD AUTO-RTO: 0 /100 WBCS
PLATELET # BLD AUTO: 210 THOUSANDS/UL (ref 149–390)
PMV BLD AUTO: 10.9 FL (ref 8.9–12.7)
POTASSIUM SERPL-SCNC: 3.9 MMOL/L (ref 3.5–5.3)
RBC # BLD AUTO: 4.78 MILLION/UL (ref 3.81–5.12)
SODIUM SERPL-SCNC: 142 MMOL/L (ref 136–145)
TRIGL SERPL-MCNC: 79 MG/DL
TSH SERPL DL<=0.05 MIU/L-ACNC: 2.19 UIU/ML (ref 0.36–3.74)
WBC # BLD AUTO: 5.64 THOUSAND/UL (ref 4.31–10.16)

## 2021-08-07 PROCEDURE — 83036 HEMOGLOBIN GLYCOSYLATED A1C: CPT

## 2021-08-07 PROCEDURE — 85025 COMPLETE CBC W/AUTO DIFF WBC: CPT

## 2021-08-07 PROCEDURE — 80048 BASIC METABOLIC PNL TOTAL CA: CPT

## 2021-08-07 PROCEDURE — 84443 ASSAY THYROID STIM HORMONE: CPT

## 2021-08-07 PROCEDURE — 80061 LIPID PANEL: CPT

## 2021-08-07 PROCEDURE — 36415 COLL VENOUS BLD VENIPUNCTURE: CPT

## 2021-08-11 ENCOUNTER — OFFICE VISIT (OUTPATIENT)
Dept: FAMILY MEDICINE CLINIC | Facility: CLINIC | Age: 61
End: 2021-08-11
Payer: COMMERCIAL

## 2021-08-11 VITALS
BODY MASS INDEX: 29.02 KG/M2 | TEMPERATURE: 98.5 F | HEIGHT: 64 IN | OXYGEN SATURATION: 98 % | HEART RATE: 68 BPM | DIASTOLIC BLOOD PRESSURE: 80 MMHG | WEIGHT: 170 LBS | SYSTOLIC BLOOD PRESSURE: 122 MMHG

## 2021-08-11 DIAGNOSIS — E11.9 TYPE 2 DIABETES MELLITUS WITHOUT COMPLICATION, WITHOUT LONG-TERM CURRENT USE OF INSULIN (HCC): Primary | ICD-10-CM

## 2021-08-11 DIAGNOSIS — E03.9 ACQUIRED HYPOTHYROIDISM: ICD-10-CM

## 2021-08-11 DIAGNOSIS — E78.2 MIXED HYPERLIPIDEMIA: ICD-10-CM

## 2021-08-11 DIAGNOSIS — I10 ESSENTIAL HYPERTENSION: ICD-10-CM

## 2021-08-11 DIAGNOSIS — E55.9 VITAMIN D DEFICIENCY: ICD-10-CM

## 2021-08-11 PROCEDURE — 99214 OFFICE O/P EST MOD 30 MIN: CPT | Performed by: FAMILY MEDICINE

## 2021-08-11 PROCEDURE — 3074F SYST BP LT 130 MM HG: CPT | Performed by: FAMILY MEDICINE

## 2021-08-11 PROCEDURE — 1036F TOBACCO NON-USER: CPT | Performed by: FAMILY MEDICINE

## 2021-08-11 PROCEDURE — 3008F BODY MASS INDEX DOCD: CPT | Performed by: FAMILY MEDICINE

## 2021-08-11 PROCEDURE — 3079F DIAST BP 80-89 MM HG: CPT | Performed by: FAMILY MEDICINE

## 2021-08-11 NOTE — PROGRESS NOTES
Subjective:   Chief Complaint   Patient presents with    Follow-up     3 month follow up        Patient ID: Dayne Dahl is a 57 y o  female  A patient here follow-up with a chronic condition patient was history of non-insulin-dependent diabetic on metformin 500 mg once a day tolerated well without any side effect deny any increased thirsty increased frequency urination no dizziness no headache and no abdomen pain patient's history of hyperlipidemia on statin tolerated well deny any rash or muscle pain a deny any chest pain short of breath no palpitation no TIA symptom patient history of hypothyroidism on levothyroxine tolerated well deny any heat or cold intolerance mood is stable patient history of vitamin-D deficiency and no recent vitamin-D level discussed the patient will order next time    recent blood work review with the patient      The following portions of the patient's history were reviewed and updated as appropriate: allergies, current medications, past family history, past medical history, past social history, past surgical history and problem list     Review of Systems   Constitutional: Negative for activity change, appetite change, fatigue and fever  HENT: Negative for congestion, ear pain, sinus pressure, sinus pain and sore throat  Eyes: Negative for pain, discharge, redness and itching  Respiratory: Negative for cough, chest tightness, shortness of breath and stridor  Cardiovascular: Negative for chest pain, palpitations and leg swelling  Gastrointestinal: Negative for abdominal pain, blood in stool, constipation, diarrhea and nausea  Genitourinary: Negative for dysuria, flank pain, frequency and hematuria  Musculoskeletal: Negative for back pain, joint swelling and neck pain  Skin: Negative for pallor and rash  Neurological: Negative for dizziness, tremors, weakness, numbness and headaches  Hematological: Does not bruise/bleed easily  Objective:  Vitals:    08/11/21 1440   BP: 122/80   BP Location: Left arm   Patient Position: Sitting   Cuff Size: Large   Pulse: 68   Temp: 98 5 °F (36 9 °C)   TempSrc: Tympanic   SpO2: 98%   Weight: 77 1 kg (170 lb)   Height: 5' 3 5" (1 613 m)      Physical Exam  Vitals and nursing note reviewed  Constitutional:       General: She is not in acute distress  Appearance: She is well-developed  She is not diaphoretic  HENT:      Head: Normocephalic  Right Ear: Tympanic membrane, ear canal and external ear normal       Left Ear: Tympanic membrane, ear canal and external ear normal       Nose: Nose normal  No congestion or rhinorrhea  Mouth/Throat:      Mouth: Mucous membranes are moist       Pharynx: Oropharynx is clear  No oropharyngeal exudate or posterior oropharyngeal erythema  Eyes:      General:         Right eye: No discharge  Left eye: No discharge  Conjunctiva/sclera: Conjunctivae normal       Pupils: Pupils are equal, round, and reactive to light  Neck:      Vascular: No JVD  Cardiovascular:      Rate and Rhythm: Normal rate and regular rhythm  Heart sounds: Normal heart sounds  No murmur heard  No gallop  Pulmonary:      Effort: Pulmonary effort is normal  No respiratory distress  Breath sounds: Normal breath sounds  No stridor  No wheezing or rales  Chest:      Chest wall: No tenderness  Abdominal:      General: There is no distension  Palpations: Abdomen is soft  There is no mass  Tenderness: There is no abdominal tenderness  There is no rebound  Musculoskeletal:         General: No tenderness  Cervical back: Normal range of motion and neck supple  Lymphadenopathy:      Cervical: No cervical adenopathy  Skin:     General: Skin is warm  Findings: No erythema or rash  Neurological:      Mental Status: She is alert and oriented to person, place, and time  Motor: No weakness        Gait: Gait normal  Assessment/Plan:    Type 2 diabetes mellitus without complication, without long-term current use of insulin ([facility])    Lab Results   Component Value Date    HGBA1C 6 2 (H) 08/07/2021      chronic asymptomatic fair control continue with the metformin 500 mg a daily encouraged patient to continue with the low carb diet encouraged patient to lose weight patient already on statin she is negative for microalbuminuria and no retinopathy    Hypothyroidism   Chronic asymptomatic fair control continue current dose of levothyroxine 112 mcg once a day    Mixed hyperlipidemia   A chronic asymptomatic LDL in recent blood work 68 close to therapeutic in diabetic patient will continue current dose of atorvastatin 20 mg once a day low-fat diet discussed with the patient    Vitamin D deficiency   A chronic asymptomatic encouraged patient to continue the vitamin-D rich diet and discussed she is due for vitamin-D level before next appointment       Diagnoses and all orders for this visit:    Type 2 diabetes mellitus without complication, without long-term current use of insulin ([facility] [address] )  -     CBC and differential; Future  -     Basic metabolic panel; Future  -     Lipid Panel with Direct LDL reflex; Future  -     Hemoglobin A1C; Future    Essential hypertension  -     CBC and differential; Future  -     Basic metabolic panel; Future  -     Lipid Panel with Direct LDL reflex; Future  -     Hemoglobin A1C; Future    Acquired hypothyroidism  -     CBC and differential; Future  -     Basic metabolic panel; Future  -     Lipid Panel with Direct LDL reflex; Future  -     Hemoglobin A1C; Future    Mixed hyperlipidemia    Vitamin D deficiency  -     Vitamin D 25 hydroxy;  Future

## 2021-08-12 PROBLEM — R10.30 LOWER ABDOMINAL PAIN: Status: RESOLVED | Noted: 2020-08-24 | Resolved: 2021-08-12

## 2021-08-12 NOTE — ASSESSMENT & PLAN NOTE
A chronic asymptomatic encouraged patient to continue the vitamin-D rich diet and discussed she is due for vitamin-D level before next appointment

## 2021-08-12 NOTE — ASSESSMENT & PLAN NOTE
Lab Results   Component Value Date    HGBA1C 6 2 (H) 08/07/2021      chronic asymptomatic fair control continue with the metformin 500 mg a daily encouraged patient to continue with the low carb diet encouraged patient to lose weight patient already on statin she is negative for microalbuminuria and no retinopathy

## 2021-08-12 NOTE — ASSESSMENT & PLAN NOTE
A chronic asymptomatic LDL in recent blood work 68 close to therapeutic in diabetic patient will continue current dose of atorvastatin 20 mg once a day low-fat diet discussed with the patient

## 2021-08-22 DIAGNOSIS — E11.9 TYPE 2 DIABETES MELLITUS WITHOUT COMPLICATION, WITHOUT LONG-TERM CURRENT USE OF INSULIN (HCC): ICD-10-CM

## 2021-08-23 RX ORDER — BLOOD SUGAR DIAGNOSTIC
STRIP MISCELLANEOUS
Qty: 50 STRIP | Refills: 0 | Status: SHIPPED | OUTPATIENT
Start: 2021-08-23 | End: 2021-09-13

## 2021-08-24 DIAGNOSIS — E11.9 TYPE 2 DIABETES MELLITUS WITHOUT COMPLICATION, WITHOUT LONG-TERM CURRENT USE OF INSULIN (HCC): ICD-10-CM

## 2021-09-12 DIAGNOSIS — E11.9 TYPE 2 DIABETES MELLITUS WITHOUT COMPLICATION, WITHOUT LONG-TERM CURRENT USE OF INSULIN (HCC): ICD-10-CM

## 2021-09-13 RX ORDER — BLOOD SUGAR DIAGNOSTIC
STRIP MISCELLANEOUS
Qty: 50 STRIP | Refills: 0 | Status: SHIPPED | OUTPATIENT
Start: 2021-09-13 | End: 2021-10-04

## 2021-09-15 ENCOUNTER — OFFICE VISIT (OUTPATIENT)
Dept: FAMILY MEDICINE CLINIC | Facility: CLINIC | Age: 61
End: 2021-09-15
Payer: COMMERCIAL

## 2021-09-15 VITALS
WEIGHT: 171 LBS | SYSTOLIC BLOOD PRESSURE: 110 MMHG | TEMPERATURE: 98.3 F | DIASTOLIC BLOOD PRESSURE: 70 MMHG | BODY MASS INDEX: 29.19 KG/M2 | HEART RATE: 64 BPM | HEIGHT: 64 IN | OXYGEN SATURATION: 97 %

## 2021-09-15 DIAGNOSIS — N64.4 BREAST PAIN, LEFT: Primary | ICD-10-CM

## 2021-09-15 PROCEDURE — 99214 OFFICE O/P EST MOD 30 MIN: CPT | Performed by: FAMILY MEDICINE

## 2021-09-15 PROCEDURE — 3725F SCREEN DEPRESSION PERFORMED: CPT | Performed by: FAMILY MEDICINE

## 2021-09-15 PROCEDURE — 3078F DIAST BP <80 MM HG: CPT | Performed by: FAMILY MEDICINE

## 2021-09-15 PROCEDURE — 3008F BODY MASS INDEX DOCD: CPT | Performed by: FAMILY MEDICINE

## 2021-09-15 PROCEDURE — 1036F TOBACCO NON-USER: CPT | Performed by: FAMILY MEDICINE

## 2021-09-15 PROCEDURE — 3074F SYST BP LT 130 MM HG: CPT | Performed by: FAMILY MEDICINE

## 2021-09-16 NOTE — PROGRESS NOTES
Subjective:   Chief Complaint   Patient presents with    Breast Pain     left        Patient ID: Cecilio Nielsen is a 57 y o  female  Patient came to the office concerned about breast pain started almost 1 week ago and she described it as a dull and pressure mostly on the outer side of the breath deny any fall or trauma and the not related to position or sleeping she deny any heavy lifting no change in the color of the skin no nipple discharge or bleeding no weight change no fatigue she does have history of abnormal mammogram  Of her right breast 1990 in had biopsy taking was negative and patient has been doing mammogram regularly and the her recent 1 was December 2020 no abnormal finding      The following portions of the patient's history were reviewed and updated as appropriate: allergies, current medications, past family history, past medical history, past social history, past surgical history and problem list     Review of Systems   Constitutional: Negative for activity change, appetite change, fatigue and fever  HENT: Negative for congestion, ear pain, sinus pressure, sinus pain and sore throat  Eyes: Negative for pain, discharge, redness and itching  Respiratory: Negative for cough, chest tightness, shortness of breath and stridor  Cardiovascular: Negative for chest pain, palpitations and leg swelling  Gastrointestinal: Negative for abdominal pain, blood in stool, constipation, diarrhea and nausea  Genitourinary: Negative for dysuria, flank pain, frequency and hematuria  Breast pain   Musculoskeletal: Negative for back pain, joint swelling and neck pain  Skin: Negative for pallor and rash  Neurological: Negative for dizziness, tremors, weakness, numbness and headaches  Hematological: Does not bruise/bleed easily               Objective:  Vitals:    09/15/21 1046   BP: 110/70   Pulse: 64   Temp: 98 3 °F (36 8 °C)   TempSrc: Tympanic   SpO2: 97%   Weight: 77 6 kg (171 lb) Height: 5' 3 5" (1 613 m)      Physical Exam  Vitals and nursing note reviewed  Constitutional:       General: She is not in acute distress  Appearance: She is well-developed  She is not diaphoretic  HENT:      Head: Normocephalic  Right Ear: Tympanic membrane, ear canal and external ear normal       Left Ear: Tympanic membrane, ear canal and external ear normal       Nose: Nose normal  No congestion or rhinorrhea  Mouth/Throat:      Mouth: Mucous membranes are moist       Pharynx: Oropharynx is clear  No oropharyngeal exudate or posterior oropharyngeal erythema  Eyes:      General:         Right eye: No discharge  Left eye: No discharge  Conjunctiva/sclera: Conjunctivae normal       Pupils: Pupils are equal, round, and reactive to light  Neck:      Vascular: No JVD  Cardiovascular:      Rate and Rhythm: Normal rate and regular rhythm  Heart sounds: Normal heart sounds  No murmur heard  No gallop  Pulmonary:      Effort: Pulmonary effort is normal  No respiratory distress  Breath sounds: Normal breath sounds  No stridor  No wheezing or rales  Chest:      Chest wall: No tenderness  Breasts: Breasts are symmetrical          Right: Tenderness present  No swelling, bleeding, inverted nipple, mass, nipple discharge or skin change  Left: No swelling, bleeding, inverted nipple, mass, nipple discharge, skin change or tenderness  Abdominal:      General: There is no distension  Palpations: Abdomen is soft  There is no mass  Tenderness: There is no abdominal tenderness  There is no rebound  Musculoskeletal:         General: No tenderness  Cervical back: Normal range of motion and neck supple  Lymphadenopathy:      Cervical: No cervical adenopathy  Upper Body:      Right upper body: No supraclavicular or axillary adenopathy  Left upper body: No supraclavicular or axillary adenopathy  Skin:     General: Skin is warm  Findings: No erythema or rash  Neurological:      Mental Status: She is alert and oriented to person, place, and time  Motor: No weakness        Gait: Gait normal            Assessment/Plan:    Breast pain, left   A new diagnosis symptomatic no history of trauma on recent on physical exam positive tenderness at the the 2:00 a m  and the review mammogram from December 20 20- the patient did have history of abnormal mammogram on the right breast in the 1990 and she did have a biopsy was negative recommend the to have a diagnostic mammogram of the left breast       Diagnoses and all orders for this visit:    Breast pain, left  -     Mammo diagnostic left w 3d & cad; Future

## 2021-09-16 NOTE — ASSESSMENT & PLAN NOTE
A new diagnosis symptomatic no history of trauma on recent on physical exam positive tenderness at the the 2:00 a m  and the review mammogram from December 20 20- the patient did have history of abnormal mammogram on the right breast in the 1990 and she did have a biopsy was negative recommend the to have a diagnostic mammogram of the left breast

## 2021-09-28 ENCOUNTER — HOSPITAL ENCOUNTER (OUTPATIENT)
Dept: MAMMOGRAPHY | Facility: CLINIC | Age: 61
Discharge: HOME | End: 2021-09-28
Payer: COMMERCIAL

## 2021-09-28 DIAGNOSIS — N64.4 BREAST PAIN, LEFT: ICD-10-CM

## 2021-09-28 PROCEDURE — 76642 ULTRASOUND BREAST LIMITED: CPT

## 2021-09-28 PROCEDURE — 77065 DX MAMMO INCL CAD UNI: CPT

## 2021-09-28 PROCEDURE — G0279 TOMOSYNTHESIS, MAMMO: HCPCS

## 2021-10-03 DIAGNOSIS — E11.9 TYPE 2 DIABETES MELLITUS WITHOUT COMPLICATION, WITHOUT LONG-TERM CURRENT USE OF INSULIN (HCC): ICD-10-CM

## 2021-10-04 RX ORDER — BLOOD SUGAR DIAGNOSTIC
STRIP MISCELLANEOUS
Qty: 50 STRIP | Refills: 0 | Status: SHIPPED | OUTPATIENT
Start: 2021-10-04 | End: 2021-10-25

## 2021-10-06 ENCOUNTER — TELEPHONE (OUTPATIENT)
Dept: FAMILY MEDICINE CLINIC | Facility: CLINIC | Age: 61
End: 2021-10-06

## 2021-10-25 DIAGNOSIS — E78.2 MIXED HYPERLIPIDEMIA: ICD-10-CM

## 2021-10-25 DIAGNOSIS — E11.9 TYPE 2 DIABETES MELLITUS WITHOUT COMPLICATION, WITHOUT LONG-TERM CURRENT USE OF INSULIN (HCC): ICD-10-CM

## 2021-10-25 RX ORDER — BLOOD SUGAR DIAGNOSTIC
STRIP MISCELLANEOUS
Qty: 50 STRIP | Refills: 0 | Status: SHIPPED | OUTPATIENT
Start: 2021-10-25 | End: 2021-11-22

## 2021-10-25 RX ORDER — ATORVASTATIN CALCIUM 20 MG/1
TABLET, FILM COATED ORAL
Qty: 30 TABLET | Refills: 5 | Status: SHIPPED | OUTPATIENT
Start: 2021-10-25 | End: 2022-04-22

## 2021-11-04 ENCOUNTER — CLINICAL SUPPORT (OUTPATIENT)
Dept: FAMILY MEDICINE CLINIC | Facility: CLINIC | Age: 61
End: 2021-11-04

## 2021-11-04 DIAGNOSIS — Z11.9 ENCOUNTER FOR SCREENING FOR INFECTIOUS AND PARASITIC DISEASES, UNSPECIFIED: Primary | ICD-10-CM

## 2021-11-05 ENCOUNTER — TELEPHONE (OUTPATIENT)
Dept: FAMILY MEDICINE CLINIC | Facility: CLINIC | Age: 61
End: 2021-11-05

## 2021-11-05 LAB — SARS-COV-2 RNA RESP QL NAA+PROBE: NEGATIVE

## 2021-11-13 DIAGNOSIS — E11.9 TYPE 2 DIABETES MELLITUS WITHOUT COMPLICATION, WITHOUT LONG-TERM CURRENT USE OF INSULIN (HCC): ICD-10-CM

## 2021-11-13 DIAGNOSIS — E03.9 ACQUIRED HYPOTHYROIDISM: ICD-10-CM

## 2021-11-15 RX ORDER — LEVOTHYROXINE SODIUM 112 UG/1
112 TABLET ORAL
Qty: 30 TABLET | Refills: 5 | Status: SHIPPED | OUTPATIENT
Start: 2021-11-15 | End: 2022-05-02

## 2021-11-21 DIAGNOSIS — E11.9 TYPE 2 DIABETES MELLITUS WITHOUT COMPLICATION, WITHOUT LONG-TERM CURRENT USE OF INSULIN (HCC): ICD-10-CM

## 2021-11-22 RX ORDER — BLOOD SUGAR DIAGNOSTIC
STRIP MISCELLANEOUS
Qty: 50 STRIP | Refills: 0 | Status: SHIPPED | OUTPATIENT
Start: 2021-11-22 | End: 2021-12-20

## 2021-12-10 ENCOUNTER — APPOINTMENT (OUTPATIENT)
Dept: LAB | Facility: HOSPITAL | Age: 61
End: 2021-12-10
Payer: COMMERCIAL

## 2021-12-10 DIAGNOSIS — E11.9 TYPE 2 DIABETES MELLITUS WITHOUT COMPLICATION, WITHOUT LONG-TERM CURRENT USE OF INSULIN (HCC): ICD-10-CM

## 2021-12-10 DIAGNOSIS — E03.9 ACQUIRED HYPOTHYROIDISM: ICD-10-CM

## 2021-12-10 DIAGNOSIS — I10 ESSENTIAL HYPERTENSION: ICD-10-CM

## 2021-12-10 DIAGNOSIS — E55.9 VITAMIN D DEFICIENCY: ICD-10-CM

## 2021-12-10 LAB
25(OH)D3 SERPL-MCNC: 22.9 NG/ML (ref 30–100)
ANION GAP SERPL CALCULATED.3IONS-SCNC: 7 MMOL/L (ref 4–13)
BASOPHILS # BLD AUTO: 0.03 THOUSANDS/ΜL (ref 0–0.1)
BASOPHILS NFR BLD AUTO: 1 % (ref 0–1)
BUN SERPL-MCNC: 16 MG/DL (ref 5–25)
CALCIUM SERPL-MCNC: 9.1 MG/DL (ref 8.3–10.1)
CHLORIDE SERPL-SCNC: 105 MMOL/L (ref 100–108)
CHOLEST SERPL-MCNC: 155 MG/DL
CO2 SERPL-SCNC: 28 MMOL/L (ref 21–32)
CREAT SERPL-MCNC: 0.77 MG/DL (ref 0.6–1.3)
EOSINOPHIL # BLD AUTO: 0.18 THOUSAND/ΜL (ref 0–0.61)
EOSINOPHIL NFR BLD AUTO: 3 % (ref 0–6)
ERYTHROCYTE [DISTWIDTH] IN BLOOD BY AUTOMATED COUNT: 12.3 % (ref 11.6–15.1)
GFR SERPL CREATININE-BSD FRML MDRD: 83 ML/MIN/1.73SQ M
GLUCOSE P FAST SERPL-MCNC: 117 MG/DL (ref 65–99)
HCT VFR BLD AUTO: 43.4 % (ref 34.8–46.1)
HDLC SERPL-MCNC: 49 MG/DL
HGB BLD-MCNC: 14.3 G/DL (ref 11.5–15.4)
IMM GRANULOCYTES # BLD AUTO: 0.01 THOUSAND/UL (ref 0–0.2)
IMM GRANULOCYTES NFR BLD AUTO: 0 % (ref 0–2)
LDLC SERPL CALC-MCNC: 86 MG/DL (ref 0–100)
LYMPHOCYTES # BLD AUTO: 2.23 THOUSANDS/ΜL (ref 0.6–4.47)
LYMPHOCYTES NFR BLD AUTO: 35 % (ref 14–44)
MCH RBC QN AUTO: 29.1 PG (ref 26.8–34.3)
MCHC RBC AUTO-ENTMCNC: 32.9 G/DL (ref 31.4–37.4)
MCV RBC AUTO: 88 FL (ref 82–98)
MONOCYTES # BLD AUTO: 0.42 THOUSAND/ΜL (ref 0.17–1.22)
MONOCYTES NFR BLD AUTO: 7 % (ref 4–12)
NEUTROPHILS # BLD AUTO: 3.44 THOUSANDS/ΜL (ref 1.85–7.62)
NEUTS SEG NFR BLD AUTO: 54 % (ref 43–75)
NRBC BLD AUTO-RTO: 0 /100 WBCS
PLATELET # BLD AUTO: 239 THOUSANDS/UL (ref 149–390)
PMV BLD AUTO: 11.2 FL (ref 8.9–12.7)
POTASSIUM SERPL-SCNC: 3.9 MMOL/L (ref 3.5–5.3)
RBC # BLD AUTO: 4.91 MILLION/UL (ref 3.81–5.12)
SODIUM SERPL-SCNC: 140 MMOL/L (ref 136–145)
TRIGL SERPL-MCNC: 101 MG/DL
WBC # BLD AUTO: 6.31 THOUSAND/UL (ref 4.31–10.16)

## 2021-12-10 PROCEDURE — 82306 VITAMIN D 25 HYDROXY: CPT

## 2021-12-10 PROCEDURE — 80061 LIPID PANEL: CPT

## 2021-12-10 PROCEDURE — 36415 COLL VENOUS BLD VENIPUNCTURE: CPT

## 2021-12-10 PROCEDURE — 85025 COMPLETE CBC W/AUTO DIFF WBC: CPT

## 2021-12-10 PROCEDURE — 80048 BASIC METABOLIC PNL TOTAL CA: CPT

## 2021-12-10 PROCEDURE — 83036 HEMOGLOBIN GLYCOSYLATED A1C: CPT

## 2021-12-11 LAB
EST. AVERAGE GLUCOSE BLD GHB EST-MCNC: 131 MG/DL
HBA1C MFR BLD: 6.2 %

## 2021-12-11 PROCEDURE — 3044F HG A1C LEVEL LT 7.0%: CPT | Performed by: FAMILY MEDICINE

## 2021-12-15 ENCOUNTER — OFFICE VISIT (OUTPATIENT)
Dept: FAMILY MEDICINE CLINIC | Facility: CLINIC | Age: 61
End: 2021-12-15
Payer: COMMERCIAL

## 2021-12-15 VITALS
TEMPERATURE: 99.8 F | DIASTOLIC BLOOD PRESSURE: 78 MMHG | OXYGEN SATURATION: 97 % | SYSTOLIC BLOOD PRESSURE: 130 MMHG | BODY MASS INDEX: 29.37 KG/M2 | HEIGHT: 64 IN | RESPIRATION RATE: 16 BRPM | WEIGHT: 172 LBS | HEART RATE: 68 BPM

## 2021-12-15 DIAGNOSIS — E11.9 TYPE 2 DIABETES MELLITUS WITHOUT COMPLICATION, WITHOUT LONG-TERM CURRENT USE OF INSULIN (HCC): ICD-10-CM

## 2021-12-15 DIAGNOSIS — I10 ESSENTIAL HYPERTENSION: ICD-10-CM

## 2021-12-15 DIAGNOSIS — E03.9 ACQUIRED HYPOTHYROIDISM: ICD-10-CM

## 2021-12-15 DIAGNOSIS — Z28.21 IMMUNIZATION REFUSED: ICD-10-CM

## 2021-12-15 DIAGNOSIS — E55.9 VITAMIN D DEFICIENCY: ICD-10-CM

## 2021-12-15 DIAGNOSIS — E78.2 MIXED HYPERLIPIDEMIA: ICD-10-CM

## 2021-12-15 DIAGNOSIS — E66.3 OVERWEIGHT WITH BODY MASS INDEX (BMI) OF 29 TO 29.9 IN ADULT: ICD-10-CM

## 2021-12-15 DIAGNOSIS — Z00.01 ENCOUNTER FOR WELL ADULT EXAM WITH ABNORMAL FINDINGS: Primary | ICD-10-CM

## 2021-12-15 PROBLEM — R10.12 LEFT UPPER QUADRANT PAIN: Status: RESOLVED | Noted: 2019-07-22 | Resolved: 2021-12-15

## 2021-12-15 PROCEDURE — 1036F TOBACCO NON-USER: CPT | Performed by: FAMILY MEDICINE

## 2021-12-15 PROCEDURE — 3725F SCREEN DEPRESSION PERFORMED: CPT | Performed by: FAMILY MEDICINE

## 2021-12-15 PROCEDURE — 99396 PREV VISIT EST AGE 40-64: CPT | Performed by: FAMILY MEDICINE

## 2021-12-15 PROCEDURE — 3078F DIAST BP <80 MM HG: CPT | Performed by: FAMILY MEDICINE

## 2021-12-15 PROCEDURE — 3075F SYST BP GE 130 - 139MM HG: CPT | Performed by: FAMILY MEDICINE

## 2021-12-15 PROCEDURE — 99214 OFFICE O/P EST MOD 30 MIN: CPT | Performed by: FAMILY MEDICINE

## 2021-12-15 PROCEDURE — 3008F BODY MASS INDEX DOCD: CPT | Performed by: FAMILY MEDICINE

## 2021-12-15 RX ORDER — PREGABALIN 75 MG/1
75 CAPSULE ORAL 2 TIMES DAILY
COMMUNITY
Start: 2021-12-14

## 2021-12-15 RX ORDER — MULTIVIT-MIN/IRON/FOLIC ACID/K 18-600-40
2000 CAPSULE ORAL DAILY
Qty: 30 CAPSULE | Refills: 3 | Status: SHIPPED | OUTPATIENT
Start: 2021-12-15 | End: 2022-05-24 | Stop reason: ALTCHOICE

## 2021-12-18 DIAGNOSIS — E11.9 TYPE 2 DIABETES MELLITUS WITHOUT COMPLICATION, WITHOUT LONG-TERM CURRENT USE OF INSULIN (HCC): ICD-10-CM

## 2021-12-20 RX ORDER — BLOOD SUGAR DIAGNOSTIC
STRIP MISCELLANEOUS
Qty: 50 STRIP | Refills: 0 | Status: SHIPPED | OUTPATIENT
Start: 2021-12-20 | End: 2022-01-14

## 2021-12-27 ENCOUNTER — HOSPITAL ENCOUNTER (OUTPATIENT)
Dept: MAMMOGRAPHY | Facility: CLINIC | Age: 61
Discharge: HOME | End: 2021-12-27
Payer: COMMERCIAL

## 2021-12-27 VITALS — HEIGHT: 64 IN | BODY MASS INDEX: 29.37 KG/M2 | WEIGHT: 172 LBS

## 2021-12-27 DIAGNOSIS — Z12.31 SCREENING MAMMOGRAM, ENCOUNTER FOR: ICD-10-CM

## 2021-12-27 DIAGNOSIS — Z12.31 ENCOUNTER FOR SCREENING MAMMOGRAM FOR MALIGNANT NEOPLASM OF BREAST: ICD-10-CM

## 2021-12-27 PROCEDURE — 77067 SCR MAMMO BI INCL CAD: CPT

## 2021-12-27 PROCEDURE — 77063 BREAST TOMOSYNTHESIS BI: CPT

## 2022-01-11 ENCOUNTER — TELEPHONE (OUTPATIENT)
Dept: FAMILY MEDICINE CLINIC | Facility: CLINIC | Age: 62
End: 2022-01-11

## 2022-01-11 NOTE — TELEPHONE ENCOUNTER
----- Message from Kelle Bates MD sent at 12/29/2021  4:25 PM EST -----  Negative mammogram repeat in one year

## 2022-02-08 DIAGNOSIS — E11.9 TYPE 2 DIABETES MELLITUS WITHOUT COMPLICATION, WITHOUT LONG-TERM CURRENT USE OF INSULIN (HCC): ICD-10-CM

## 2022-03-14 ENCOUNTER — APPOINTMENT (OUTPATIENT)
Dept: LAB | Facility: HOSPITAL | Age: 62
End: 2022-03-14
Payer: COMMERCIAL

## 2022-03-14 DIAGNOSIS — E11.9 TYPE 2 DIABETES MELLITUS WITHOUT COMPLICATION, WITHOUT LONG-TERM CURRENT USE OF INSULIN (HCC): ICD-10-CM

## 2022-03-14 DIAGNOSIS — E66.3 OVERWEIGHT WITH BODY MASS INDEX (BMI) OF 29 TO 29.9 IN ADULT: ICD-10-CM

## 2022-03-14 DIAGNOSIS — I10 ESSENTIAL HYPERTENSION: ICD-10-CM

## 2022-03-14 DIAGNOSIS — E55.9 VITAMIN D DEFICIENCY: ICD-10-CM

## 2022-03-14 LAB
ALBUMIN SERPL BCP-MCNC: 3.7 G/DL (ref 3.5–5)
ALP SERPL-CCNC: 98 U/L (ref 46–116)
ALT SERPL W P-5'-P-CCNC: 29 U/L (ref 12–78)
ANION GAP SERPL CALCULATED.3IONS-SCNC: 6 MMOL/L (ref 4–13)
AST SERPL W P-5'-P-CCNC: 21 U/L (ref 5–45)
BASOPHILS # BLD AUTO: 0.02 THOUSANDS/ΜL (ref 0–0.1)
BASOPHILS NFR BLD AUTO: 0 % (ref 0–1)
BILIRUB SERPL-MCNC: 0.62 MG/DL (ref 0.2–1)
BUN SERPL-MCNC: 15 MG/DL (ref 5–25)
CALCIUM SERPL-MCNC: 9.1 MG/DL (ref 8.3–10.1)
CHLORIDE SERPL-SCNC: 108 MMOL/L (ref 100–108)
CHOLEST SERPL-MCNC: 150 MG/DL
CO2 SERPL-SCNC: 28 MMOL/L (ref 21–32)
CREAT SERPL-MCNC: 0.86 MG/DL (ref 0.6–1.3)
CREAT UR-MCNC: 211 MG/DL
EOSINOPHIL # BLD AUTO: 0.24 THOUSAND/ΜL (ref 0–0.61)
EOSINOPHIL NFR BLD AUTO: 3 % (ref 0–6)
ERYTHROCYTE [DISTWIDTH] IN BLOOD BY AUTOMATED COUNT: 11.9 % (ref 11.6–15.1)
EST. AVERAGE GLUCOSE BLD GHB EST-MCNC: 128 MG/DL
GFR SERPL CREATININE-BSD FRML MDRD: 72 ML/MIN/1.73SQ M
GLUCOSE P FAST SERPL-MCNC: 127 MG/DL (ref 65–99)
HBA1C MFR BLD: 6.1 %
HCT VFR BLD AUTO: 44.5 % (ref 34.8–46.1)
HDLC SERPL-MCNC: 46 MG/DL
HGB BLD-MCNC: 15 G/DL (ref 11.5–15.4)
IMM GRANULOCYTES # BLD AUTO: 0.02 THOUSAND/UL (ref 0–0.2)
IMM GRANULOCYTES NFR BLD AUTO: 0 % (ref 0–2)
LDLC SERPL CALC-MCNC: 81 MG/DL (ref 0–100)
LYMPHOCYTES # BLD AUTO: 2.85 THOUSANDS/ΜL (ref 0.6–4.47)
LYMPHOCYTES NFR BLD AUTO: 39 % (ref 14–44)
MCH RBC QN AUTO: 30.5 PG (ref 26.8–34.3)
MCHC RBC AUTO-ENTMCNC: 33.7 G/DL (ref 31.4–37.4)
MCV RBC AUTO: 90 FL (ref 82–98)
MICROALBUMIN UR-MCNC: 9.5 MG/L (ref 0–20)
MICROALBUMIN/CREAT 24H UR: 5 MG/G CREATININE (ref 0–30)
MONOCYTES # BLD AUTO: 0.5 THOUSAND/ΜL (ref 0.17–1.22)
MONOCYTES NFR BLD AUTO: 7 % (ref 4–12)
NEUTROPHILS # BLD AUTO: 3.73 THOUSANDS/ΜL (ref 1.85–7.62)
NEUTS SEG NFR BLD AUTO: 51 % (ref 43–75)
NONHDLC SERPL-MCNC: 104 MG/DL
NRBC BLD AUTO-RTO: 0 /100 WBCS
PLATELET # BLD AUTO: 221 THOUSANDS/UL (ref 149–390)
PMV BLD AUTO: 11.5 FL (ref 8.9–12.7)
POTASSIUM SERPL-SCNC: 4.1 MMOL/L (ref 3.5–5.3)
PROT SERPL-MCNC: 7.3 G/DL (ref 6.4–8.2)
RBC # BLD AUTO: 4.92 MILLION/UL (ref 3.81–5.12)
SODIUM SERPL-SCNC: 142 MMOL/L (ref 136–145)
TRIGL SERPL-MCNC: 115 MG/DL
TSH SERPL DL<=0.05 MIU/L-ACNC: 1.47 UIU/ML (ref 0.36–3.74)
WBC # BLD AUTO: 7.36 THOUSAND/UL (ref 4.31–10.16)

## 2022-03-14 PROCEDURE — 83036 HEMOGLOBIN GLYCOSYLATED A1C: CPT

## 2022-03-14 PROCEDURE — 3044F HG A1C LEVEL LT 7.0%: CPT | Performed by: FAMILY MEDICINE

## 2022-03-14 PROCEDURE — 84443 ASSAY THYROID STIM HORMONE: CPT

## 2022-03-14 PROCEDURE — 82043 UR ALBUMIN QUANTITATIVE: CPT | Performed by: FAMILY MEDICINE

## 2022-03-14 PROCEDURE — 80061 LIPID PANEL: CPT

## 2022-03-14 PROCEDURE — 82570 ASSAY OF URINE CREATININE: CPT | Performed by: FAMILY MEDICINE

## 2022-03-14 PROCEDURE — 3061F NEG MICROALBUMINURIA REV: CPT | Performed by: FAMILY MEDICINE

## 2022-03-14 PROCEDURE — 36415 COLL VENOUS BLD VENIPUNCTURE: CPT

## 2022-03-14 PROCEDURE — 85025 COMPLETE CBC W/AUTO DIFF WBC: CPT

## 2022-03-14 PROCEDURE — 80053 COMPREHEN METABOLIC PANEL: CPT

## 2022-03-16 ENCOUNTER — OFFICE VISIT (OUTPATIENT)
Dept: FAMILY MEDICINE CLINIC | Facility: CLINIC | Age: 62
End: 2022-03-16
Payer: COMMERCIAL

## 2022-03-16 VITALS
HEIGHT: 64 IN | OXYGEN SATURATION: 98 % | WEIGHT: 171.4 LBS | SYSTOLIC BLOOD PRESSURE: 122 MMHG | HEART RATE: 78 BPM | BODY MASS INDEX: 29.26 KG/M2 | TEMPERATURE: 99.7 F | DIASTOLIC BLOOD PRESSURE: 70 MMHG

## 2022-03-16 DIAGNOSIS — G47.09 OTHER INSOMNIA: Primary | ICD-10-CM

## 2022-03-16 DIAGNOSIS — I10 ESSENTIAL HYPERTENSION: ICD-10-CM

## 2022-03-16 DIAGNOSIS — Z13.29 SCREENING FOR THYROID DISORDER: ICD-10-CM

## 2022-03-16 DIAGNOSIS — E78.2 MIXED HYPERLIPIDEMIA: ICD-10-CM

## 2022-03-16 DIAGNOSIS — E11.9 TYPE 2 DIABETES MELLITUS WITHOUT COMPLICATION, WITHOUT LONG-TERM CURRENT USE OF INSULIN (HCC): ICD-10-CM

## 2022-03-16 PROCEDURE — 1036F TOBACCO NON-USER: CPT | Performed by: FAMILY MEDICINE

## 2022-03-16 PROCEDURE — 3074F SYST BP LT 130 MM HG: CPT | Performed by: FAMILY MEDICINE

## 2022-03-16 PROCEDURE — 3008F BODY MASS INDEX DOCD: CPT | Performed by: FAMILY MEDICINE

## 2022-03-16 PROCEDURE — 99214 OFFICE O/P EST MOD 30 MIN: CPT | Performed by: FAMILY MEDICINE

## 2022-03-16 PROCEDURE — 3078F DIAST BP <80 MM HG: CPT | Performed by: FAMILY MEDICINE

## 2022-03-16 RX ORDER — TRAZODONE HYDROCHLORIDE 50 MG/1
50 TABLET ORAL
Qty: 30 TABLET | Refills: 2 | Status: SHIPPED | OUTPATIENT
Start: 2022-03-16 | End: 2022-06-13

## 2022-03-16 NOTE — ASSESSMENT & PLAN NOTE
Lab Results   Component Value Date    HGBA1C 6 1 (H) 03/14/2022   Chronic asymptomatic fair control continue current management including metformin 500 mg once a day low carb diet important lose weight discussed the patient she is already on statin

## 2022-03-16 NOTE — ASSESSMENT & PLAN NOTE
Asymptomatic patient try multiple home remedy including over-the-counter melatonin 10 mg and is not helping have recommend trazodone 50 mg once a day proper use and possible side effect discussed the patient his sleeping hygiene also review with the patient

## 2022-03-16 NOTE — PROGRESS NOTES
Subjective:   Chief Complaint   Patient presents with    Follow-up     3 MONTH    Results     LABS 3 14 22        Patient ID: Amarilis Espino is a 57 y o  female  Patient here follow-up with a chronic condition concerned about insomnia she has been hard time to go to sleep and it has been going on for more than 3 months deny any depression or anxiety she having hard time to fall sleep and when she get up having hard time to go back to sleep she tried multiple home remedy and over-the-counter melatonin and is not working patient was history of the non-insulin-dependent diabetic hypertension compliant with the medication tolerated well without side effect recent blood work review with the patient      The following portions of the patient's history were reviewed and updated as appropriate: allergies, current medications, past family history, past medical history, past social history, past surgical history and problem list     Review of Systems   Constitutional: Negative for activity change, appetite change, fatigue and fever  HENT: Negative for congestion, ear pain, sinus pressure, sinus pain and sore throat  Eyes: Negative for pain, discharge, redness and itching  Respiratory: Negative for cough, chest tightness, shortness of breath and stridor  Cardiovascular: Negative for chest pain, palpitations and leg swelling  Gastrointestinal: Negative for abdominal pain, blood in stool, constipation, diarrhea and nausea  Genitourinary: Negative for dysuria, flank pain, frequency and hematuria  Musculoskeletal: Negative for back pain, joint swelling and neck pain  Skin: Negative for pallor and rash  Neurological: Negative for dizziness, tremors, weakness, numbness and headaches  Hematological: Does not bruise/bleed easily  Psychiatric/Behavioral: Positive for sleep disturbance               Objective:  Vitals:    03/16/22 1414   BP: 122/70   BP Location: Left arm   Patient Position: Sitting Cuff Size: Large   Pulse: 78   Temp: 99 7 °F (37 6 °C)   TempSrc: Tympanic   SpO2: 98%   Weight: 77 7 kg (171 lb 6 4 oz)   Height: 5' 3 5" (1 613 m)      Physical Exam  Vitals and nursing note reviewed  Constitutional:       General: She is not in acute distress  Appearance: She is well-developed  She is not diaphoretic  HENT:      Head: Normocephalic  Right Ear: Tympanic membrane, ear canal and external ear normal       Left Ear: Tympanic membrane, ear canal and external ear normal       Nose: Nose normal  No congestion or rhinorrhea  Mouth/Throat:      Mouth: Mucous membranes are moist       Pharynx: Oropharynx is clear  No oropharyngeal exudate or posterior oropharyngeal erythema  Eyes:      General:         Right eye: No discharge  Left eye: No discharge  Conjunctiva/sclera: Conjunctivae normal       Pupils: Pupils are equal, round, and reactive to light  Neck:      Vascular: No JVD  Cardiovascular:      Rate and Rhythm: Normal rate and regular rhythm  Pulses: no weak pulses          Dorsalis pedis pulses are 2+ on the right side and 2+ on the left side  Heart sounds: Normal heart sounds  No murmur heard  No gallop  Pulmonary:      Effort: Pulmonary effort is normal  No respiratory distress  Breath sounds: Normal breath sounds  No stridor  No wheezing or rales  Chest:      Chest wall: No tenderness  Abdominal:      General: There is no distension  Palpations: Abdomen is soft  There is no mass  Tenderness: There is no abdominal tenderness  There is no rebound  Musculoskeletal:         General: No tenderness  Cervical back: Normal range of motion and neck supple  Feet:      Right foot:      Skin integrity: No warmth  Left foot:      Skin integrity: No warmth  Lymphadenopathy:      Cervical: No cervical adenopathy  Skin:     General: Skin is warm  Findings: No erythema or rash     Neurological:      Mental Status: She is alert and oriented to person, place, and time  Motor: No weakness  Gait: Gait normal          Patient's shoes and socks removed  Right Foot/Ankle   Right Foot Inspection  Skin Exam: skin intact  No warmth and no pre-ulcer  Toe Exam: No swelling and erythema    Sensory   Monofilament testing: intact    Vascular  Capillary refills: < 3 seconds  The right DP pulse is 2+  Left Foot/Ankle  Left Foot Inspection  Skin Exam: skin intact  No warmth and no pre-ulcer  Toe Exam: No swelling and no erythema  Sensory   Monofilament testing: intact    Vascular  Capillary refills: < 3 seconds  The left DP pulse is 2+  Assign Risk Category  No deformity present  No loss of protective sensation  No weak pulses  Risk: 0    Assessment/Plan:    Type 2 diabetes mellitus without complication, without long-term current use of insulin ([facility])    Lab Results   Component Value Date    HGBA1C 6 1 (H) 03/14/2022   Chronic asymptomatic fair control continue current management including metformin 500 mg once a day low carb diet important lose weight discussed the patient she is already on statin    Essential hypertension  Chronic asymptomatic fair control continue with the low salt diet    Other insomnia  Asymptomatic patient try multiple home remedy including over-the-counter melatonin 10 mg and is not helping have recommend trazodone 50 mg once a day proper use and possible side effect discussed the patient his sleeping hygiene also review with the patient       Diagnoses and all orders for this visit:    Other insomnia  -     traZODone (DESYREL) 50 mg tablet; Take 1 tablet (50 mg total) by mouth daily at bedtime  -     Lipid Panel with Direct LDL reflex; Future  -     CBC and differential; Future  -     Basic metabolic panel; Future  -     TSH, 3rd generation with Free T4 reflex;  Future    Type 2 diabetes mellitus without complication, without long-term current use of insulin ([facility])    Essential hypertension  - Lipid Panel with Direct LDL reflex; Future  -     CBC and differential; Future  -     Basic metabolic panel; Future  -     TSH, 3rd generation with Free T4 reflex; Future    Mixed hyperlipidemia  -     Lipid Panel with Direct LDL reflex; Future  -     CBC and differential; Future  -     Basic metabolic panel; Future  -     TSH, 3rd generation with Free T4 reflex; Future    Screening for thyroid disorder  -     Lipid Panel with Direct LDL reflex; Future  -     CBC and differential; Future  -     Basic metabolic panel; Future  -     TSH, 3rd generation with Free T4 reflex;  Future

## 2022-04-22 DIAGNOSIS — E78.2 MIXED HYPERLIPIDEMIA: ICD-10-CM

## 2022-04-22 RX ORDER — ATORVASTATIN CALCIUM 20 MG/1
TABLET, FILM COATED ORAL
Qty: 30 TABLET | Refills: 5 | Status: SHIPPED | OUTPATIENT
Start: 2022-04-22

## 2022-04-30 DIAGNOSIS — E03.9 ACQUIRED HYPOTHYROIDISM: ICD-10-CM

## 2022-04-30 DIAGNOSIS — E11.9 TYPE 2 DIABETES MELLITUS WITHOUT COMPLICATION, WITHOUT LONG-TERM CURRENT USE OF INSULIN (HCC): ICD-10-CM

## 2022-05-02 RX ORDER — LEVOTHYROXINE SODIUM 112 UG/1
112 TABLET ORAL
Qty: 30 TABLET | Refills: 5 | Status: SHIPPED | OUTPATIENT
Start: 2022-05-02

## 2022-05-24 ENCOUNTER — OFFICE VISIT (OUTPATIENT)
Dept: FAMILY MEDICINE CLINIC | Facility: CLINIC | Age: 62
End: 2022-05-24
Payer: COMMERCIAL

## 2022-05-24 VITALS
HEIGHT: 64 IN | OXYGEN SATURATION: 95 % | TEMPERATURE: 98 F | HEART RATE: 65 BPM | SYSTOLIC BLOOD PRESSURE: 110 MMHG | WEIGHT: 170 LBS | DIASTOLIC BLOOD PRESSURE: 80 MMHG | BODY MASS INDEX: 29.02 KG/M2

## 2022-05-24 DIAGNOSIS — I83.812 VARICOSE VEINS OF LEFT LOWER EXTREMITY WITH PAIN: Primary | ICD-10-CM

## 2022-05-24 DIAGNOSIS — M79.605 PAIN OF LEFT LOWER EXTREMITY: ICD-10-CM

## 2022-05-24 PROCEDURE — 1036F TOBACCO NON-USER: CPT | Performed by: FAMILY MEDICINE

## 2022-05-24 PROCEDURE — 99214 OFFICE O/P EST MOD 30 MIN: CPT | Performed by: FAMILY MEDICINE

## 2022-05-24 PROCEDURE — 3074F SYST BP LT 130 MM HG: CPT | Performed by: FAMILY MEDICINE

## 2022-05-24 PROCEDURE — 3725F SCREEN DEPRESSION PERFORMED: CPT | Performed by: FAMILY MEDICINE

## 2022-05-24 PROCEDURE — 3008F BODY MASS INDEX DOCD: CPT | Performed by: FAMILY MEDICINE

## 2022-05-24 PROCEDURE — 3079F DIAST BP 80-89 MM HG: CPT | Performed by: FAMILY MEDICINE

## 2022-05-24 RX ORDER — NAPROXEN 500 MG/1
500 TABLET ORAL EVERY 12 HOURS PRN
COMMUNITY
Start: 2022-04-22 | End: 2022-06-16 | Stop reason: SDUPTHER

## 2022-05-24 NOTE — ASSESSMENT & PLAN NOTE
A new diagnosis symptomatic no history of trauma pain radiate from the back down to her foot the a no muscle weakness the leg raise test was positive on the left side the recommend the x-ray of the lumbar spine to rule out discogenic disease patient already on Naprosyn to take it twice a day for 2 weeks if there is no improvement to call the office

## 2022-05-24 NOTE — ASSESSMENT & PLAN NOTE
A chronic symptomatic with pain no history of clot the patient does not wear compression stocking daily it is hard to wear not compliant with it recommend patient to be evaluated by vascular surgeon

## 2022-05-24 NOTE — PROGRESS NOTES
Subjective:   Chief Complaint   Patient presents with    Leg Pain     Left leg        Patient ID: Charlene Rubio is a 60 y o  female  Patient here concerned about the left leg pain it has been going on for 1 week radiated from the lower back down to her leg no numbness no muscle weakness no recent fall or trauma worse when she getting up from sitting position or she is going up steps or up hill no muscle weakness no rash      The following portions of the patient's history were reviewed and updated as appropriate: allergies, current medications, past family history, past medical history, past social history, past surgical history and problem list     Review of Systems   Constitutional: Negative for activity change, appetite change, fatigue and fever  HENT: Negative for congestion, ear pain, sinus pressure, sinus pain and sore throat  Eyes: Negative for pain, discharge, redness and itching  Respiratory: Negative for cough, chest tightness, shortness of breath and stridor  Cardiovascular: Negative for chest pain, palpitations and leg swelling  Gastrointestinal: Negative for abdominal pain, blood in stool, constipation, diarrhea and nausea  Genitourinary: Negative for dysuria, flank pain, frequency and hematuria  Musculoskeletal: Negative for back pain, joint swelling and neck pain  Left leg pain   Skin: Negative for pallor and rash  Neurological: Negative for dizziness, tremors, weakness, numbness and headaches  Hematological: Does not bruise/bleed easily  Objective:  Vitals:    05/24/22 1028   BP: 110/80   Pulse: 65   Temp: 98 °F (36 7 °C)   TempSrc: Tympanic   SpO2: 95%   Weight: 77 1 kg (170 lb)   Height: 5' 4" (1 626 m)      Physical Exam  Vitals and nursing note reviewed  Constitutional:       General: She is not in acute distress  Appearance: She is well-developed  She is not diaphoretic  HENT:      Head: Normocephalic        Right Ear: Tympanic membrane, ear canal and external ear normal       Left Ear: Tympanic membrane, ear canal and external ear normal       Nose: Nose normal  No congestion or rhinorrhea  Mouth/Throat:      Mouth: Mucous membranes are moist       Pharynx: Oropharynx is clear  No oropharyngeal exudate or posterior oropharyngeal erythema  Eyes:      General:         Right eye: No discharge  Left eye: No discharge  Conjunctiva/sclera: Conjunctivae normal       Pupils: Pupils are equal, round, and reactive to light  Neck:      Vascular: No JVD  Cardiovascular:      Rate and Rhythm: Normal rate and regular rhythm  Heart sounds: Normal heart sounds  No murmur heard  No gallop  Comments: Positive for varicose vein in left lower extremity  Pulmonary:      Effort: Pulmonary effort is normal  No respiratory distress  Breath sounds: Normal breath sounds  No stridor  No wheezing or rales  Chest:      Chest wall: No tenderness  Abdominal:      General: There is no distension  Palpations: Abdomen is soft  There is no mass  Tenderness: There is no abdominal tenderness  There is no rebound  Musculoskeletal:      Cervical back: Normal range of motion and neck supple  Lumbar back: Tenderness present  Positive left straight leg raise test    Lymphadenopathy:      Cervical: No cervical adenopathy  Skin:     General: Skin is warm  Findings: No erythema or rash  Neurological:      Mental Status: She is alert and oriented to person, place, and time  Motor: No weakness        Gait: Gait normal            Assessment/Plan:    Pain of left lower extremity  A new diagnosis symptomatic no history of trauma pain radiate from the back down to her foot the a no muscle weakness the leg raise test was positive on the left side the recommend the x-ray of the lumbar spine to rule out discogenic disease patient already on Naprosyn to take it twice a day for 2 weeks if there is no improvement to call the office    Varicose veins of left lower extremity with pain  A chronic symptomatic with pain no history of clot the patient does not wear compression stocking daily it is hard to wear not compliant with it recommend patient to be evaluated by vascular surgeon       Diagnoses and all orders for this visit:    Varicose veins of left lower extremity with pain  -     Ambulatory Referral to Vascular Surgery; Future    Pain of left lower extremity  -     XR spine lumbar minimum 4 views non injury; Future    Other orders  -     naproxen (NAPROSYN) 500 mg tablet;  Take 500 mg by mouth every 12 (twelve) hours as needed

## 2022-05-25 ENCOUNTER — HOSPITAL ENCOUNTER (OUTPATIENT)
Dept: RADIOLOGY | Facility: HOSPITAL | Age: 62
Discharge: HOME | End: 2022-05-25
Payer: COMMERCIAL

## 2022-05-25 DIAGNOSIS — M79.605 PAIN OF LEFT LOWER EXTREMITY: ICD-10-CM

## 2022-05-25 PROCEDURE — 72110 X-RAY EXAM L-2 SPINE 4/>VWS: CPT

## 2022-06-05 DIAGNOSIS — E11.9 TYPE 2 DIABETES MELLITUS WITHOUT COMPLICATION, WITHOUT LONG-TERM CURRENT USE OF INSULIN (HCC): ICD-10-CM

## 2022-06-06 RX ORDER — BLOOD SUGAR DIAGNOSTIC
STRIP MISCELLANEOUS
Qty: 50 STRIP | Refills: 5 | Status: SHIPPED | OUTPATIENT
Start: 2022-06-06

## 2022-06-12 DIAGNOSIS — G47.09 OTHER INSOMNIA: ICD-10-CM

## 2022-06-13 RX ORDER — TRAZODONE HYDROCHLORIDE 50 MG/1
TABLET ORAL
Qty: 30 TABLET | Refills: 2 | Status: SHIPPED | OUTPATIENT
Start: 2022-06-13

## 2022-06-16 ENCOUNTER — HOSPITAL ENCOUNTER (EMERGENCY)
Facility: HOSPITAL | Age: 62
Discharge: HOME | End: 2022-06-16
Payer: COMMERCIAL

## 2022-06-16 VITALS
DIASTOLIC BLOOD PRESSURE: 84 MMHG | SYSTOLIC BLOOD PRESSURE: 168 MMHG | HEART RATE: 95 BPM | RESPIRATION RATE: 20 BRPM | TEMPERATURE: 97.6 F | OXYGEN SATURATION: 99 %

## 2022-06-16 DIAGNOSIS — S39.012A STRAIN OF LUMBAR PARASPINOUS MUSCLE, INITIAL ENCOUNTER: Primary | ICD-10-CM

## 2022-06-16 PROCEDURE — 99284 EMERGENCY DEPT VISIT MOD MDM: CPT

## 2022-06-16 PROCEDURE — 99283 EMERGENCY DEPT VISIT LOW MDM: CPT

## 2022-06-16 RX ORDER — ACETAMINOPHEN 325 MG/1
650 TABLET ORAL EVERY 6 HOURS PRN
Qty: 30 TABLET | Refills: 0 | Status: SHIPPED | OUTPATIENT
Start: 2022-06-16 | End: 2022-07-18 | Stop reason: ALTCHOICE

## 2022-06-16 RX ORDER — LIDOCAINE 50 MG/G
1 PATCH TOPICAL ONCE
Status: DISCONTINUED | OUTPATIENT
Start: 2022-06-16 | End: 2022-06-16 | Stop reason: HOSPADM

## 2022-06-16 RX ORDER — ACETAMINOPHEN 325 MG/1
650 TABLET ORAL ONCE
Status: DISCONTINUED | OUTPATIENT
Start: 2022-06-16 | End: 2022-06-16 | Stop reason: HOSPADM

## 2022-06-16 RX ORDER — LIDOCAINE 50 MG/G
1 PATCH TOPICAL DAILY
Qty: 15 PATCH | Refills: 0 | Status: SHIPPED | OUTPATIENT
Start: 2022-06-16

## 2022-06-16 RX ORDER — DIAZEPAM 5 MG/1
5 TABLET ORAL ONCE
Status: COMPLETED | OUTPATIENT
Start: 2022-06-16 | End: 2022-06-16

## 2022-06-16 RX ORDER — METHOCARBAMOL 500 MG/1
500 TABLET, FILM COATED ORAL 2 TIMES DAILY
Qty: 20 TABLET | Refills: 0 | Status: SHIPPED | OUTPATIENT
Start: 2022-06-16

## 2022-06-16 RX ORDER — NAPROXEN 500 MG/1
500 TABLET ORAL EVERY 12 HOURS PRN
Qty: 20 TABLET | Refills: 0 | Status: SHIPPED | OUTPATIENT
Start: 2022-06-16

## 2022-06-16 RX ADMIN — DIAZEPAM 5 MG: 5 TABLET ORAL at 10:31

## 2022-06-16 NOTE — DISCHARGE INSTRUCTIONS
Thank you for allowing for us to care for you today  You have a strain of a muscle in your left lower back  Please take the prescribed medications for your pain  I have referred you to the [facility] for re-evaluation and treatment of your muscle sprain  Please return if you develop numbness to your legs, urinate or stool on yourself without knowing it, or have numbness in your bikini area  Please do not take medications that are not prescribed to you

## 2022-06-16 NOTE — ED NOTES
RN informed provider that patient declined lidocaine patch and tylenol   Pt requesting to go home due to not getting stronger medications      Rosa Elena Disla RN  06/16/22 [id number]

## 2022-06-16 NOTE — ED PROVIDER NOTES
History  Chief Complaint   Patient presents with    Back Pain     Pt states she sneezed and felt a pop in her L lower back now complaining of pain      Patient is a 69-year-old female presenting after acute onset of left lower back pain upon sneezing this morning  Patient tearful on exam, endorsing severe 10/10, stabbing, spasm like pain that does not radiate  She has not yet tried anything for the pain  She denies recent trauma, and heavy lifting  She denies fevers, chills, headache, chest pain, shortness of breath, abdominal pain, n/v/d, urinary or bowel incontinence, saddle anesthesia and numbness/tingling to lower extremities  She notes the pain is worse upon lifting her left leg and upon flexing her torso forwards/when twisting her torso         History provided by:  Patient   used: Yes ([id number])    Back Pain  Location:  Gluteal region  Quality:  Stabbing  Radiates to:  Does not radiate  Pain severity:  Severe  Onset quality:  Sudden  Duration:  1 hour  Timing:  Constant  Progression:  Unchanged  Chronicity:  New  Context: not emotional stress, not falling, not jumping from heights, not lifting heavy objects, not MVA, not occupational injury, not pedestrian accident, not physical stress, not recent illness, not recent injury and not twisting    Context comment:  Sneezing  Relieved by:  Nothing  Worsened by:  Palpation, coughing and sneezing  Ineffective treatments:  None tried  Associated symptoms: no abdominal pain, no abdominal swelling, no bladder incontinence, no bowel incontinence, no chest pain, no dysuria, no fever, no headaches, no leg pain, no numbness, no paresthesias, no pelvic pain, no perianal numbness, no tingling, no weakness and no weight loss    Risk factors: hx of osteoporosis and menopause    Risk factors: no hx of cancer, no lack of exercise, not obese, not pregnant, no recent surgery, no steroid use and no vascular disease        Prior to Admission Medications Prescriptions Last Dose Informant Patient Reported? Taking?    Lancets (ONETOUCH ULTRASOFT) lancets  Self No No   Sig: One touch- tests bid #100 with 2 refills   OneTouch Verio test strip   No No   Sig: TEST 2 (TWO) TIMES A DAY   atorvastatin (LIPITOR) 20 mg tablet  Self No No   Sig: TAKE 1 TABLET BY MOUTH EVERY DAY   levothyroxine 112 mcg tablet  Self No No   Sig: TAKE 1 TABLET (112 MCG TOTAL) BY MOUTH DAILY IN THE EARLY MORNING   metFORMIN (GLUCOPHAGE) 500 mg tablet  Self No No   Sig: TAKE 1 TABLET BY MOUTH EVERY DAY   naproxen (NAPROSYN) 500 mg tablet  Self Yes No   Sig: Take 500 mg by mouth every 12 (twelve) hours as needed   naproxen (NAPROSYN) 500 mg tablet   No Yes   Sig: Take 1 tablet (500 mg total) by mouth every 12 (twelve) hours as needed for moderate pain   pregabalin (LYRICA) 75 mg capsule  Self Yes No   Sig: Take 75 mg by mouth 2 (two) times a day   traZODone (DESYREL) 50 mg tablet   No No   Sig: TAKE 1 TABLET BY MOUTH DAILY AT BEDTIME      Facility-Administered Medications: None       Past Medical History:   Diagnosis Date    Anxiety 3/29/2019    Asthma     Cataract     viral    Chronic kidney disease     Hyperlipidemia     Hypertension     Hypothyroid     IFG (impaired fasting glucose) 2019    Incisional hernia     Insomnia     Kidney stone     in past    Left upper quadrant pain 2019    Lower abdominal pain 2020    Osteoarthritis     RA (rheumatoid arthritis) ([facility] [address] ) 2015    pt unsure     Type 2 diabetes mellitus without complication, without long-term current use of insulin ([facility] [address] ) 2019    NIDDM    Uses Uzbek as primary spoken language     Varicose veins of both lower extremities     Wears dentures      upper partials    Wears glasses        Past Surgical History:   Procedure Laterality Date    APPENDECTOMY      BREAST BIOPSY Right     negative     SECTION      x 3    CHOLECYSTECTOMY      COLONOSCOPY      HERNIA REPAIR      HYSTERECTOMY  AR LAP, INCISIONAL HERNIA REPAIR,REDUCIBLE N/A 5/29/2019    Procedure: REPAIR HERNIA INCISIONAL LAPAROSCOPIC W/ ROBOTICS;  Surgeon: Loren Christianson MD;  Location: [facility] OR;  Service: General       Family History   Problem Relation Age of Onset    Emphysema Mother     No Known Problems Sister     No Known Problems Daughter     No Known Problems Sister     No Known Problems Sister     No Known Problems Sister     No Known Problems Paternal Aunt     No Known Problems Maternal Aunt     No Known Problems Maternal Aunt      I have reviewed and agree with the history as documented  E-Cigarette/Vaping    E-Cigarette Use Never User      E-Cigarette/Vaping Substances    Nicotine No     THC No     CBD No     Flavoring No     Other No     Unknown No      Social History     Tobacco Use    Smoking status: Never Smoker    Smokeless tobacco: Never Used    Tobacco comment: no passive smoke exposure   Vaping Use    Vaping Use: Never used   Substance Use Topics    Alcohol use: Yes     Comment: liquor    Drug use: No       Review of Systems   Constitutional: Negative for chills, fever and weight loss  HENT: Negative for ear pain and sore throat  Eyes: Negative for pain and visual disturbance  Respiratory: Negative for cough and shortness of breath  Cardiovascular: Negative for chest pain, palpitations and leg swelling  Gastrointestinal: Negative for abdominal pain, bowel incontinence, constipation, diarrhea, nausea and vomiting  Genitourinary: Negative for bladder incontinence, difficulty urinating, dysuria, flank pain, hematuria and pelvic pain  Musculoskeletal: Positive for back pain (Left lower back) and gait problem (difficulty walking d/t severe pain)  Negative for arthralgias, joint swelling, myalgias, neck pain and neck stiffness  Skin: Negative for color change and rash     Neurological: Negative for dizziness, tingling, seizures, syncope, weakness, light-headedness, numbness, headaches and paresthesias  All other systems reviewed and are negative  Physical Exam  Physical Exam  Vitals and nursing note reviewed  Constitutional:       General: She is awake  She is not in acute distress  Appearance: Normal appearance  She is well-developed and normal weight  She is not ill-appearing, toxic-appearing or diaphoretic  HENT:      Head: Normocephalic and atraumatic  Nose: Nose normal       Mouth/Throat:      Mouth: Mucous membranes are moist       Pharynx: Oropharynx is clear  Eyes:      Extraocular Movements: Extraocular movements intact  Conjunctiva/sclera: Conjunctivae normal    Cardiovascular:      Rate and Rhythm: Normal rate and regular rhythm  Pulses: Normal pulses  Heart sounds: Normal heart sounds  No murmur heard  Pulmonary:      Effort: Pulmonary effort is normal  No respiratory distress  Breath sounds: Normal breath sounds  Abdominal:      Palpations: Abdomen is soft  Tenderness: There is no abdominal tenderness  There is no guarding or rebound  Musculoskeletal:         General: Tenderness (Left paraspinal process) present  No swelling, deformity or signs of injury  Cervical back: Normal, normal range of motion and neck supple  No swelling, rigidity, tenderness or bony tenderness  No pain with movement  Normal range of motion  Thoracic back: Normal  No swelling, deformity, tenderness or bony tenderness  Lumbar back: Spasms and tenderness present  No swelling, edema, deformity, signs of trauma or bony tenderness  Decreased range of motion  Positive left straight leg raise test  Negative right straight leg raise test         Back:       Right lower leg: No edema  Left lower leg: No edema  Skin:     General: Skin is warm and dry  Capillary Refill: Capillary refill takes less than 2 seconds  Neurological:      General: No focal deficit present        Mental Status: She is alert and oriented to person, place, and time  Mental status is at baseline  Sensory: No sensory deficit  Motor: No weakness  Coordination: Coordination normal    Psychiatric:         Behavior: Behavior is cooperative  Vital Signs  ED Triage Vitals [06/16/22 0949]   Temperature Pulse Respirations Blood Pressure SpO2   97 6 °F (36 4 °C) 95 20 168/84 99 %      Temp Source Heart Rate Source Patient Position - Orthostatic VS BP Location FiO2 (%)   Oral Monitor Sitting Right arm --      Pain Score       9           Vitals:    06/16/22 0949   BP: 168/84   Pulse: 95   Patient Position - Orthostatic VS: Sitting         Visual Acuity      ED Medications  Medications   acetaminophen (TYLENOL) tablet 650 mg (650 mg Oral Not Given 6/16/22 1031)   lidocaine (LIDODERM) 5 % patch 1 patch (1 patch Topical Not Given 6/16/22 1031)   diazepam (VALIUM) tablet 5 mg (5 mg Oral Given 6/16/22 1031)       Diagnostic Studies  Results Reviewed     None                 No orders to display              Procedures  Procedures         ED Course                                             MDM  Number of Diagnoses or Management Options  Risk of Complications, Morbidity, and/or Mortality  General comments: Pt is a 62yF that is anxious and tearful on exam d/t 10/10 L lower back pain s/p sneezing in her bathroom this morning  PE exam is notable for focal tenderness to the L paraspinal muscles  No C, T, or L spine tenderness  XR not indicated at this time  Likely dx of L lumbar muscle strain  Pt refused tylenol and lidocaine patch, but took prescribed valium  Pt encouraged to take all prescribed meds but notes she has tylenol and lidocaine patches at home  On reassessment, pt with a notable decrease in pain with rating it 5/10  She is sitting upright, speaking on the phone with her son, and able to reposition self in bed without grimacing/crying   came to bedside to provide safe transportation home and DC education completed with both of them   Pt instructed to take the prescribed medications and to f/u with the comp spine clinic for ongoing management/tx  She requests a prescription of valium but education completed on risks; pt agreeable to taking prescribed meds  Pt ambulatory with steady gait  Patient Progress  Patient progress: improved      Disposition  Final diagnoses:   Strain of lumbar paraspinous muscle, initial encounter     Time reflects when diagnosis was documented in both MDM as applicable and the Disposition within this note     Time User Action Codes Description Comment    6/16/2022 10:41 AM Beverly Thomas Add [[id number]] Strain of lumbar paraspinous muscle, initial encounter       ED Disposition     ED Disposition   Discharge    Condition   Stable    Date/Time   Thu Jun 16, 2022 10:40 AM    Comment   Sandeep Blackburn discharge to home/self care  Follow-up Information     Follow up With Specialties Details Why Contact Info Additional Information    Drew Mejias MD Family Medicine Schedule an appointment as soon as possible for a visit in 1 week For re-evaluation [address] [facility]    [address] [facility] Emergency Department Emergency Medicine Go in 1 week If symptoms worsen [facility] [id number]  [address] [place] [address] Emergency Department, [address] [place] [address] Physical Therapy Schedule an appointment as soon as possible for a visit in 1 week  [phone]          Patient's Medications   Discharge Prescriptions    ACETAMINOPHEN (TYLENOL) 325 MG TABLET    Take 2 tablets (650 mg total) by mouth every 6 (six) hours as needed for mild pain       Start Date: 6/16/2022 End Date: --       Order Dose: 650 mg       Quantity: 30 tablet    Refills: 0    LIDOCAINE (LIDODERM) 5 %    Apply 1 patch topically daily Remove & Discard patch within 12 hours or as directed by MD       Start Date: 6/16/2022 End Date: --       Order Dose: 1 patch       Quantity: 15 patch    Refills: 0    METHOCARBAMOL (ROBAXIN) 500 MG TABLET    Take 1 tablet (500 mg total) by mouth 2 (two) times a day       Start Date: 6/16/2022 End Date: --       Order Dose: 500 mg       Quantity: 20 tablet    Refills: 0           PDMP Review     None          ED Provider  Electronically Signed by           Mejia Feliz  06/16/22 1437

## 2022-06-17 ENCOUNTER — NURSE TRIAGE (OUTPATIENT)
Dept: PHYSICAL THERAPY | Facility: OTHER | Age: 62
End: 2022-06-17

## 2022-06-17 DIAGNOSIS — M54.50 ACUTE LEFT-SIDED LOW BACK PAIN, UNSPECIFIED WHETHER SCIATICA PRESENT: Primary | ICD-10-CM

## 2022-06-17 NOTE — TELEPHONE ENCOUNTER
Additional Information   Negative: Is this related to an MVA?  Negative: Is this related to a work injury?  Negative: Are you currently recieving homecare services?  Negative: Has the patient had unexplained weight loss?  Negative: Does the patient have a fever?  Negative: Is the patient experiencing urine retention?  Negative: Is the patient experiencing blood in sputum?  Negative: Has the patient experienced major trauma? (fall from height, high speed collision, direct blow to spine) and is also experiencing nausea, light-headedness, or loss of consciousness?  Negative: Is the patient experiencing acute drop foot or paralysis?  Negative: Is this a chronic condition? Background - Initial Assessment  Clinical complaint: left sided low back pain and L buttock  Date of onset: yesterday   Frequency of pain: intermittent  Quality of pain:    Protocols used: [facility] COMPREHENSIVE SPINE PROGRAM PROTOCOL    Pt seen in ED yesterday-PLEASE SEE NOTES  This encounter done with Judy # [id number]    This RN did review the Comprehensive Spine Program in detail and what we offer for their back or neck pain  Patient is agreeable to triage and would like to proceed w/ Evaluation/Sessions with [facility] therapist     Triaged and NO RF s/s present    Nurse also offered a call from the [facility] counselor d/t SBT score  Patient declined  Patient was pleasant and appropriate during this encounter  Patients information was sent to the preferred site and patient made aware clerical would be calling to schedule appointment  Contact information for the [facility] was provided for the patient as well  Patient appreciative of call   Nurse wished patient well and referral closed per protocol  Patient did not voice any questions and/or concerns  All information about patients intended careplan reviewed  Patient in agreement with nurse's explanation of referrals and treatment plan

## 2022-06-22 ENCOUNTER — CONSULT (OUTPATIENT)
Dept: VASCULAR SURGERY | Facility: CLINIC | Age: 62
End: 2022-06-22
Payer: COMMERCIAL

## 2022-06-22 VITALS
BODY MASS INDEX: 28.95 KG/M2 | SYSTOLIC BLOOD PRESSURE: 126 MMHG | HEIGHT: 64 IN | WEIGHT: 169.6 LBS | DIASTOLIC BLOOD PRESSURE: 70 MMHG | HEART RATE: 71 BPM

## 2022-06-22 DIAGNOSIS — I83.812 VARICOSE VEINS OF LEFT LOWER EXTREMITY WITH PAIN: ICD-10-CM

## 2022-06-22 PROCEDURE — 99243 OFF/OP CNSLTJ NEW/EST LOW 30: CPT

## 2022-06-22 PROCEDURE — 1036F TOBACCO NON-USER: CPT

## 2022-06-22 PROCEDURE — 3008F BODY MASS INDEX DOCD: CPT

## 2022-06-22 NOTE — ASSESSMENT & PLAN NOTE
62yoF, never smoker, w/ PMHx significant for hypothyroidism, DM, HTN, DDD, OA, HLD, HTN, presents today for evaluation of symptomatic varicose veins  Assessment:   -LLE>>RLE truncal varicosities at anterior and posterior lower leg extending to the anterior knee  -BLE are warm, perfused without cyanosis, edema or wounds   -Palpable B DP, PT, Pop, femoral pulses      -Discussed pathophysiology of varicose veins, current management, and indication for surgical intervention  Patient is interested in surgical intervention    -Recommend 3 month trial of conservative measures with use of daily compression hose (20-30 mmHg), lower extremity elevation, regular exercise/ weight management, and diligent skin care  - Script given for daily compression stockings today   - Obtain LEVR duplex in 3 months  - Return to office with surgeon in 3 months with LEVDR for re-evaluation and discussion of surgical options  - Instructed to call the office in the interim with any questions, concerns, or new symptoms

## 2022-06-22 NOTE — PROGRESS NOTES
Assessment/Plan:    Varicose veins of left lower extremity with pain  62yoF, never smoker, w/ PMHx significant for hypothyroidism, DM, HTN, DDD, OA, HLD, HTN, presents today for evaluation of symptomatic varicose veins  Assessment:   -LLE>>RLE truncal varicosities at anterior and posterior lower leg extending to the anterior knee  -BLE are warm, perfused without cyanosis, edema or wounds   -Palpable B DP, PT, Pop, femoral pulses      -Discussed pathophysiology of varicose veins, current management, and indication for surgical intervention  Patient is interested in surgical intervention    -Recommend 3 month trial of conservative measures with use of daily compression hose (20-30 mmHg), lower extremity elevation, regular exercise/ weight management, warm compresses for discomfort, and diligent skin care  - Tubigrip sleeve applied in office today  - Script given for daily compression stockings today   - Obtain LEVR duplex in 3 months  - Return to office with surgeon in 3 months with LEVDR for re-evaluation and discussion of surgical options  - Instructed to call the office in the interim with any questions, concerns, or new symptoms  Diagnoses and all orders for this visit:    Varicose veins of left lower extremity with pain  -     Ambulatory Referral to Vascular Surgery  -     Compression Stocking  -     VAS reflux lower limb venous duplex study with reflux assesment, unilateral; Future          Subjective:      Patient ID: Inessa Daniels is a 60 y o  female  HPI  New patient referred by Renae Wang for LLE varicose veins  Patient is primarily [address] speaking  A Uzbek medical interpretor was used today at office visit ( #[id number])  Pt reports L>R bulging veins that are accompanied by pain, tingling and occasional swelling and cramping  She states she has had the pain in her leg for >1 week   Pt has a history of lumbar back pain and was recently seen in the ED for lumbar strain  She has not been wearing compression stockings or elevating her legs  She states she used below knee compression in the past for 3 days and it caused her discomfort  She utilizes OTC analgesics for pain but gets minimal relief  She admits to a family history of VV  Pt reports she does not work or spend a lot of time on her feet  She reports however doing a lot of house chores  She denies wounds  She states the legs start to hurt after walking or being on her feet for a long time  Pt is interested in surgical intervention  I gave her a script for compression stockings (thigh high) in office today and also applied a tubigrip sleeve  She will obtain an ultrasound in 3 months to assess reflux and follow up with a surgeon  Pt is taking Atorvastatin and is not a smoker  The following portions of the patient's history were reviewed and updated as appropriate: allergies, current medications, past family history, past medical history, past social history, past surgical history and problem list     Review of Systems   Cardiovascular: Positive for leg swelling  Painful and tingling varicose veins L>R   All other systems reviewed and are negative  I have personally reviewed and made appropriate changes to the ROS that was input by the medical assistant         Objective:      Vitals:    06/22/22 1419   BP: 126/70   BP Location: Right arm   Patient Position: Sitting   Cuff Size: Standard   Pulse: 71   Weight: 76 9 kg (169 lb 9 6 oz)   Height: 5' 4" (1 626 m)       Patient Active Problem List   Diagnosis    Mild intermittent asthma without complication    Cervical myofascial pain syndrome    Cervical stenosis of spinal canal    Degeneration of lumbar intervertebral disc    Encounter for monitoring chronic NSAID therapy    Essential hypertension    Hypothyroidism    Gastritis due to nonsteroidal anti-inflammatory drug    Kidney stone    Mixed hyperlipidemia    Neuralgia    Osteoarthritis    Primary osteoarthritis of left knee    Vitamin D deficiency    Immunization refused    Overweight with body mass index (BMI) of 29 to 29 9 in adult    Encounter for well adult exam with abnormal findings    Weakness of right arm    LUIS positive    Type 2 diabetes mellitus without complication, without long-term current use of insulin (HCC)    Snoring    Other insomnia    Anxiety    Herpes zoster without complication    Vertigo    Other microscopic hematuria    Varicose veins of left lower extremity with pain    Chronic pain of right knee    Breast pain, left    Pain of left lower extremity       Past Surgical History:   Procedure Laterality Date    APPENDECTOMY      BREAST BIOPSY Right 1992    negative     SECTION      x 3    CHOLECYSTECTOMY      COLONOSCOPY      HERNIA REPAIR      HYSTERECTOMY      ME LAP, INCISIONAL HERNIA REPAIR,REDUCIBLE N/A 2019    Procedure: REPAIR HERNIA INCISIONAL LAPAROSCOPIC W/ ROBOTICS;  Surgeon: Regan Urena MD;  [facility];  Service: General       Family History   Problem Relation Age of Onset    Emphysema Mother     No Known Problems Sister     No Known Problems Daughter     No Known Problems Sister     No Known Problems Sister     No Known Problems Sister     No Known Problems Paternal Aunt     No Known Problems Maternal Aunt     No Known Problems Maternal Aunt        Social History     Socioeconomic History    Marital status: /Civil Union     Spouse name: Not on file    Number of children: Not on file    Years of education: Not on file    Highest education level: Not on file   Occupational History    Not on file   Tobacco Use    Smoking status: Never Smoker    Smokeless tobacco: Never Used    Tobacco comment: no passive smoke exposure   Vaping Use    Vaping Use: Never used   Substance and Sexual Activity    Alcohol use: Yes     Comment: liquor    Drug use: No    Sexual activity: Not Currently     Partners: Male Other Topics Concern    Not on file   Social History Narrative    Not on file     Social Determinants of Health     Financial Resource Strain: Low Risk     Difficulty of Paying Living Expenses: Not hard at all   Food Insecurity: No Food Insecurity    Worried About Running Out of Food in the Last Year: Never true    Jennifer of Food in the Last Year: Never true   Transportation Needs: Not on file   Physical Activity: Sufficiently Active    Days of Exercise per Week: 3 days    Minutes of Exercise per Session: 60 min   Stress: No Stress Concern Present    Feeling of Stress : Not at all   Social Connections:  Moderately Integrated    Frequency of Communication with Friends and Family: More than three times a week    Frequency of Social Gatherings with Friends and Family: More than three times a week    Attends Scientologist Services: More than 4 times per year    Active Member of [employer] or Organizations: No    Attends Club or Organization Meetings: Never    Marital Status:    Intimate Partner Violence: Not At Risk    Fear of Current or Ex-Partner: No    Emotionally Abused: No    Physically Abused: No    Sexually Abused: No   Housing Stability: Unknown    Unable to Pay for Housing in the Last Year: No    Number of Places Lived in the Last Year: Not on file    Unstable Housing in the Last Year: No       No Known Allergies      Current Outpatient Medications:     atorvastatin (LIPITOR) 20 mg tablet, TAKE 1 TABLET BY MOUTH EVERY DAY, Disp: 30 tablet, Rfl: 5    Lancets (ONETOUCH ULTRASOFT) lancets, One touch- tests bid #100 with 2 refills, Disp: 100 each, Rfl: 2    levothyroxine 112 mcg tablet, TAKE 1 TABLET (112 MCG TOTAL) BY MOUTH DAILY IN THE EARLY MORNING, Disp: 30 tablet, Rfl: 5    metFORMIN (GLUCOPHAGE) 500 mg tablet, TAKE 1 TABLET BY MOUTH EVERY DAY, Disp: 30 tablet, Rfl: 2    methocarbamol (ROBAXIN) 500 mg tablet, Take 1 tablet (500 mg total) by mouth 2 (two) times a day, Disp: 20 tablet, Rfl: 0    naproxen (NAPROSYN) 500 mg tablet, Take 1 tablet (500 mg total) by mouth every 12 (twelve) hours as needed for moderate pain, Disp: 20 tablet, Rfl: 0    OneTouch Verio test strip, TEST 2 (TWO) TIMES A DAY, Disp: 50 strip, Rfl: 5    pregabalin (LYRICA) 75 mg capsule, Take 75 mg by mouth 2 (two) times a day, Disp: , Rfl:     traZODone (DESYREL) 50 mg tablet, TAKE 1 TABLET BY MOUTH DAILY AT BEDTIME, Disp: 30 tablet, Rfl: 2    acetaminophen (TYLENOL) 325 mg tablet, Take 2 tablets (650 mg total) by mouth every 6 (six) hours as needed for mild pain (Patient not taking: No sig reported), Disp: 30 tablet, Rfl: 0    lidocaine (Lidoderm) 5 %, Apply 1 patch topically daily Remove & Discard patch within 12 hours or as directed by MD, Disp: 15 patch, Rfl: 0      /70 (BP Location: Right arm, Patient Position: Sitting, Cuff Size: Standard)   Pulse 71   Ht 5' 4" (1 626 m)   Wt 76 9 kg (169 lb 9 6 oz)   BMI 29 11 kg/m²          Physical Exam  Vitals and nursing note reviewed  Constitutional:       Appearance: Normal appearance  She is obese  HENT:      Head: Normocephalic and atraumatic  Eyes:      Extraocular Movements: Extraocular movements intact  Pupils: Pupils are equal, round, and reactive to light  Neck:      Vascular: No carotid bruit  Cardiovascular:      Rate and Rhythm: Normal rate and regular rhythm  Pulses:           Radial pulses are 2+ on the right side and 2+ on the left side  Femoral pulses are 2+ on the right side and 2+ on the left side  Popliteal pulses are 2+ on the right side and 2+ on the left side  Dorsalis pedis pulses are 2+ on the right side and 2+ on the left side  Posterior tibial pulses are 2+ on the right side and 2+ on the left side  Heart sounds: Normal heart sounds  Comments: No carotid bruit   Pulmonary:      Effort: Pulmonary effort is normal  No respiratory distress  Breath sounds: Normal breath sounds  Abdominal:      General: Bowel sounds are normal  There is no distension  Palpations: Abdomen is soft  Comments: No abdominal bruit or pulsatile masses  Musculoskeletal:         General: No swelling  Normal range of motion  Cervical back: Normal range of motion and neck supple  Right lower leg: No edema  Left lower leg: No edema  Skin:     General: Skin is warm  Capillary Refill: Capillary refill takes less than 2 seconds  Comments: LLE truncal varicosities at anterior and posterior lower leg extending to the anterior knee  Neurological:      General: No focal deficit present  Mental Status: She is alert and oriented to person, place, and time     Psychiatric:         Mood and Affect: Mood normal          Behavior: Behavior normal

## 2022-06-22 NOTE — PATIENT INSTRUCTIONS
Várices   CUIDADO AMBULATORIO:   Várices son Niurka Rafa que se engrandecen, tuercen e hinchan  Las venas varicosas comúnmente aparecen en la parte trasera de jennifer pantorrillas, rodillas y muslos  Las várices se [place] [address] no funcionan adecuadamente  [place] causa que la janae se acumule y aumente la presión en las venas de jennifer piernas  El aumento de presión causa que jennifer venas se estiren, engrandezcan, hinchen y se tuerzan  Los síntomas más comunes [place] siguientes: Jennifer síntomas pueden empeorar después de olesya estado de pie o sentado por largos periodos de [place]  Puede presentar cualquiera de los siguientes signos o síntomas:  Venas azules, moradas o sobresalientes en jennifer piernas    Dolor, hinchazón o calambres musculares en jennifer piernas    Sentir fatiga o pesadez en las piernas    [employer] en las piernas    [place] atención médica de inmediato si:  Usted tiene mega herida que no yola o está infectada  Usted tiene mega lesión que agrietó desai piel y provocó que las venas varicosas sangraran  Desai pierna está inflamada y dura  Usted nota que jennifer piernas o pies se están poniendo azulados o ennegrecidos  Desai pierna se siente cálida, sensible y [place]  Se podría flavio inflamado y delong  Comuníquese con desai médico si:  Usted tiene dolor en desai pierna que no desaparece o que empeora  Nota grandes moretones repentinos en las piernas  Usted tiene sarpullido en desai pierna  Jennifer síntomas le impiden realizar jennifer actividades diarias  Usted tiene preguntas o inquietudes acerca de desai condición o cuidado  El tratamiento para las venas varicosas tiene adele propósito disminuir síntomas, mejorar la apariencia y prevenir más problemas  El tratamiento dependerá de cuáles venas son afectadas y la severidad de desai condición en cada mega de jennifer venas afectadas  Usted puede necesitar procedimientos para tratar o eliminar las venas varicosas   Es probable que desai médico le [employer] solución o use un láser para cerrar las venas varicosas  También se puede hacer mega cirugía para quitar venas largas  [place] a desai médico más información acerca de los procedimientos utilizados para tratar venas varicosas  [address] várices:  No se siente o esté de pie por largos periodos de tiempo  Exline puede causar que la janae se acumule en yvonne piernas y que yvonne síntomas empeoren  Doble o gire yvonne tobillos varias veces cada hora  Camine por varios minutos cada hora para que la janae en yvonne piernas se [place]  No cruce yvonne piernas cuando se siente  Exline disminuye el flujo sanguíneo a yvonne pies y [employer]  No use ropa o zapatos ajustados  No use zapatos de tacones altos  No use ropa que [place] alrededor de la cintura o las rodillas  Mantenga un peso saludable  Desai sobrepeso u obesidad puede empeorar tus varices  Consulte con desai médico cuánto debería pesar  Pídale que lo ayude a crear un plan para bajar de peso si tiene sobrepeso  Use medias de compresión adele se le indique  Las medias son ajustadas y ejercen presión a yvonne piernas  Mejoran el flujo sanguíneo y [facility] a prevenir coágulos sanguíneos  Eleve yvonne piernas  Manténgalas a un nivel por encima de desai corazón por 15 a 30 minutos varias veces al día  También puede apuntalar el extremo de desai cama ligeramente para elevar las piernas mientras duerme  Exline ayuda a que la janae fluya de regreso hacia desai corazón  Ejercítese regularmente  Consulte con desai médico acerca de cuál es el mejor régimen de ejercicio para usted  El ejercicio puede mejorar el flujo sanguíneo a las piernas y los pies  Acuda a la consulta de control con desai médico según las indicaciones: Anote yvonne preguntas para que se acuerde de hacerlas alayna yvonne visitas  © Copyright [employer] 2022 Information is for End User's use only and may not be sold, redistributed or otherwise used for commercial purposes   All illustrations and images included in [facility] are the copyrighted property of A D A M , Inc  [facility] es sólo para uso en educación  Desai intención no es darle un consejo médico sobre enfermedades o tratamientos  Colsulte con desai Ronalee Mon farmacéutico antes de seguir cualquier régimen médico para saber si es seguro y efectivo para usted  Ablación endovenosa   CUIDADO AMBULATORIO:   Lo que usted necesita saber acerca de la ablación endovenosa: La ablación endovenosa es un procedimiento que [place] mega energía de radiofrecuencia o láser para tratar las venas varicosas  Las várices son Maudine Bevel y torcidas de las piernas que se abultan debajo de la piel  La ablación endovenosa podría ayudarlo a tratar [employer], la decoloración de la piel o las úlceras en desai pierna que son provocadas por yvonne venas varicosas  Cómo prepararse para mega ablación endovenosa:  Desai médico hablará con usted acerca de cómo prepararse para el procedimiento  Podría indicarle que no coma o tome nada varias horas antes de desai procedimiento  Le dirá qué medicamentos puede bhavani o no el día del procedimiento  Es posible que usted necesite dejar de bhavani anticoagulantes varios días antes de desai procedimiento para evitar sangrado  Usted podría necesitar un ultrasonido para ayudar a que desai médico planee desai procedimiento  Un ultrasonido podría realizarse para [employer] forma y ubicación de yvonne venas varicosas  Usted necesitará planear para que alguien lo lleve a desai casa después de desai procedimiento  Qué sucederá alayna mega ablación endovenosa:  Es posible que le administren anestesia local para adormecer el área de la [facility]  Con la anestesia local, usted todavía podría sentir presión o molestia alayna el procedimiento, katherin no debería sentir dolor  También podrían administrarle un sedante por vía intravenosa para ayudarlo a relajarse alayna el procedimiento  Desai médico podría realizar mega incisión pequeña en desai piel  Introducirá un catéter pequeño a través de desai piel hacia dentro de desai vena  Desai médico mandará energía láser o de radiofrecuencia a través del catéter y calentará desai vena  El calor cerrará desai vena para detener el flujo de janae  Jennifer venas varicosas se encogerán y dejarán de abultarse debajo de desai piel  Desai médico [address]  Colocará un vendaje pequeño sobre desai incisión  Lo que sucederá después de la ablación endovenosa:  Es posible que pase la noche en el hospital para monitorear la circulación de la pierna  Usted podría necesitar usar medias de compresión  Las medias son ajustadas y ejercen presión a jennifer piernas  Ryder mejora el flujo de [place] y [place] a prevenir coágulos  Usted podría necesitar un ultrasonido dentro de un lapso de 72 horas para observar jennifer venas y determinar si hay coágulos de [place]  Desai médico podría indicarle que comience jennifer actividades normales inmediatamente después de mega ablación endovenosa  También puede indicarle que evite viajar en avión o estar sentado alayna mucho tiempo  Es posible que usted sienta dolor leve a moderado  Usted podría desarrollar moretones de leves a severos  Desai médico le indicará cómo tratar [employer] y los moretones  Riesgos de mega ablación endovenosa: Es posible que [place] de lo habitual o que desarrolle mega infección  Usted podría desarrollar mega bolsa de janae debajo de desai piel que posiblemente necesiten remover  Se le podría formar un coágulo sanguíneo en la pierna  Ryder podría poner en peligro desai sulaiman  Los nervios y los vasos sanguíneos podrían sufrir daño alayna desai procedimiento  Desai vena podría inflamarse, ponerse eric y adolorida  Desai piel podría sufrir de quemaduras  Llame al [place] de emergencias local (911 en los [place]) en cualquiera de los siguientes casos:  Usted se siente [place], le hace falta el aire y tiene dolor de [place]  Usted expectora janae  Usted tiene dificultad para respirar      [address]  de inmediato si:  La janae empapa el vendaje  Siente que la pierna está caliente, sensible y dolorida  Cualquier parte de desai pierna se siente adormecida o pálida al tacto  Llame a desai médico si:  Usted tiene fiebre o escalofríos  Desai herida está eric, inflamada o drena pus  Usted tiene náuseas o vómitos  Tiene comezón, inflamación o un sarpullido en la piel  Usted tiene preguntas o inquietudes acerca de desai condición o cuidado  Cuidado propio en desai hogar:  Marguerita Salt  No pase demasiado tiempo sentado o parado  Si tiene que estar de pie, cambie el peso de mega pierna a otra con frecuencia  [place] caminatas cortas alayna el día  El caminar mejorará el flujo sanguíneo y evitará la formación de coágulos de janae en desai pierna  Pregunte a desai médico cuánta actividad usted necesita cada día  No cruce yvonne piernas cuando se siente  [place] disminuye el flujo sanguíneo a yvonne pies y [employer]  Use medias de compresión o vendajes adele se le indique: Las medias de compresión son [place] Neumann y ejercen presión en yvonne piernas  [place] mejora el flujo de [place] y [place] a prevenir coágulos  Aplique mega compresa tibia adele se le indique: Mega compresa tibia puede ser mega toalla pequeña o un paño para lavarse pequeño  [place] en agua tibia (no caliente)  Aplique la compresa tibia sobre desai pierna  Mega compresa tibia podría ayudarlo con la molestia debido a mega lesión en desai vena alayna el procedimiento  Eleve desai pierna  Debe elevar la pierna por encima del nivel de desai corazón con la frecuencia que pueda  [place] va a disminuir inflamación y el dolor  Coloque desai pierna sobre almohadas o cobijas para mantenerla elevada cómodamente  No use ropa o zapatos ajustados  No use zapatos de tacones altos  No use ropa que [place] alrededor de la cintura o las rodillas      Medicamentos: Es posible que usted necesite alguno de los siguientes:  Puede administrarse podrían administrarse  Pregunte al médico cómo debe bhavani danny medicamento de forma mirza  Algunos medicamentos recetados para el dolor contienen acetaminofén  No tome otros medicamentos que contengan acetaminofén sin consultarlo con desai médico  Demasiado acetaminofeno puede causar daño al hígado  Los medicamentos recetados para el dolor podrían causar estreñimiento  Pregunte a desai médico adele prevenir o tratar estreñimiento  Los [place], adele el ibuprofeno, [place] [facility] a disminuir la inflamación, el dolor y la [place]  Los EMILIANO pueden causar sangrado estomacal o problemas renales en ciertas personas  Si usted jace un medicamento anticoagulante, siempre pregúntele a desai médico si los EMILIANO son seguros para usted  Siempre yimi la etiqueta de danny medicamento y [place] instrucciones  [place] yvonne medicamentos adele se le haya indicado  Consulte con desai médico si usted margareth que desai medicamento no le está ayudando o si presenta efectos secundarios  Infórmele si es alérgico a cualquier medicamento  Mantenga mega lista actualizada de los [place], las vitaminas y los productos herbales que jace  Incluya los siguientes datos de los medicamentos: cantidad, frecuencia y motivo de administración  Traiga con usted la lista o los envases de las píldoras a yvonne citas de seguimiento  Lleve la lista de los medicamentos con usted en allie de mega emergencia  Cuide el área del procedimiento: Vidhya Cecil cuidadosamente Amita Stephen con agua y [place]  Seque el área y póngale vendajes nuevos y limpios adele le indicaron  Cambie yvonne vendajes cuando se mojen o ensucien  Revise si hay signos de infección, adele enrojecimiento, hinchazón o pus  Acuda a la consulta de control con desai médico según las indicaciones: Anote yvonne preguntas para que se acuerde de hacerlas alayna yvonne visitas  © Copyright [employer] [place] 2022 Information is for End User's use only and may not be sold, redistributed or otherwise used for commercial purposes   All illustrations and images included in [facility] are the copyrighted property of A D A M , Inc  or [address] [place] [address] es sólo para uso en educación  Desai intención no es darle un consejo médico sobre enfermedades o tratamientos  Colsulte con desai Clarine Sours farmacéutico antes de seguir cualquier régimen médico para saber si es seguro y efectivo para usted

## 2022-06-24 ENCOUNTER — EVALUATION (OUTPATIENT)
Dept: PHYSICAL THERAPY | Facility: CLINIC | Age: 62
End: 2022-06-24
Payer: COMMERCIAL

## 2022-06-24 VITALS — SYSTOLIC BLOOD PRESSURE: 146 MMHG | DIASTOLIC BLOOD PRESSURE: 84 MMHG

## 2022-06-24 DIAGNOSIS — M54.50 ACUTE LEFT-SIDED LOW BACK PAIN, UNSPECIFIED WHETHER SCIATICA PRESENT: ICD-10-CM

## 2022-06-24 PROCEDURE — 97112 NEUROMUSCULAR REEDUCATION: CPT | Performed by: PHYSICAL THERAPIST

## 2022-06-24 PROCEDURE — 97140 MANUAL THERAPY 1/> REGIONS: CPT | Performed by: PHYSICAL THERAPIST

## 2022-06-24 PROCEDURE — 97162 PT EVAL MOD COMPLEX 30 MIN: CPT | Performed by: PHYSICAL THERAPIST

## 2022-06-24 NOTE — PROGRESS NOTES
PT Evaluation     Today's date: 2022  Patient name: Pepe Lockhart  : 1960  MRN: [id number]  Referring provider: Azra Rascon PT  Dx:   Encounter Diagnosis     ICD-10-CM    1  Acute left-sided low back pain, unspecified whether sciatica present  M54 50 Ambulatory referral to PT spine                  Assessment  Assessment details: Pt is a 60y o  year old female presenting to physical therapy for Acute left-sided low back pain, unspecified whether sciatica present  Pt falls into the high risk category via the start back tool from the comp spine program  She present with the following impairments; limited ROM and pain in all lumbar spine motions except extension, weakness with hip and knee flexion b/l, hypomobility in spine from T8-S1, , with pain from L1-S1, TTP in paraspinals b/l, hypertonic right paraspinal muscles, + passive L SLR, + L slump affecting their function with walking, sitting, standing, lifting, reaching, transferring from sitting to standing, sleeping on side, navigating stairs, balance, and other household activites  Pt will benefit for a specific exercise, extension based program   Pt will benefit from skilled physical therapy to address functional limitations noted in evaluation and meet patient goals  Impairments: abnormal or restricted ROM, activity intolerance, impaired physical strength, lacks appropriate home exercise program, pain with function, weight-bearing intolerance and poor body mechanics  Barriers to therapy: Mongolian Speaking    Goals  ST: Pt will reduce pain to 3/10 at rest   2: Pt will be able to ambulate 100 feet down the hallway with no increase in pain in her low back or leg in 2-4 weeks  LT: Pt will have lumbar flexion deficits to nil in all motions to improve with standing and lifting tolerance in 6-8 weeks  2: Pt will have hip flexion strength b/l of 5/5 in 6-8 weeks to show improved strength and decreased nerve pain down the leg     3: Pt will be I w HEP    Plan  Patient would benefit from: skilled physical therapy  Planned modality interventions: TENS, electrical stimulation/Russian stimulation, cryotherapy, microcurrent electrical stimulation, traction and thermotherapy: hydrocollator packs  Planned therapy interventions: ADL retraining, manual therapy, massage, joint mobilization, abdominal trunk stabilization, balance, muscle pump exercises, neuromuscular re-education, transfer training, therapeutic training, therapeutic exercise, therapeutic activities, stretching, strengthening, graded exercise, graded motor, home exercise program, graded activity, gait training, functional ROM exercises, flexibility and patient education  Frequency: 2x week  Duration in weeks: 6        Subjective Evaluation    History of Present Illness  Mechanism of injury: Pt has pain in her low back and down her left leg  Pt has not been able to walk very well since the pain started  Pain started when pt sneezed 2 weeks ago  Pt feels like she is limping more and it is bothering her a lot  Pt reports pain is worse she goes to standing up and she feels like she is crooked  Pt is taking naproxen for pain  Pt can't sleep on her left side, she also wakes ups in the middle of the night a lot because she has insomnia  Pt has not has back pain like this before  Pt had an X-Ray and it showed that everything was okay, just referral to PT  Pain  Current pain ratin  At best pain rating: 3  At worst pain rating: 10      Diagnostic Tests  X-ray: normal        Objective     Palpation   Left   Tenderness of the erector spinae and lumbar paraspinals  Right   Hypertonic in the erector spinae and lumbar paraspinals  Tenderness of the erector spinae and lumbar paraspinals       Active Range of Motion     Lumbar   Flexion:  with pain Restriction level: moderate  Extension:  WFL  Left lateral flexion:  with pain Restriction level: moderate  Right lateral flexion:  Restriction level: minimal  Left rotation:  with pain Restriction level: moderate  Right rotation:  with pain Restriction level: moderate    Joint Play     Hypomobile: T8, T9, T10, T11, T12, L1, L2, L3, L4, L5 and S1     Pain: L1, L2, L3, L4, L5 and S1     Strength/Myotome Testing     Lumbar   Left   Heel walk: normal  Toe walk: normal    Right   Heel walk: normal  Toe walk: normal    Left Hip   Planes of Motion   Flexion: 4    Right Hip   Planes of Motion   Flexion: 4-    Left Knee   Flexion: 4  Extension: 5    Right Knee   Flexion: 4+  Extension: 5    Tests     Lumbar     Left   Positive passive SLR and slump test    Negative crossed SLR  Right   Negative crossed SLR, passive SLR and slump test      Treatment supervised by Len HICKMAN           Precautions: high risk, [place] speaking    Date 6/24            Visit # 1            FOTO 42/61             Re-eval IE              Manuals 6/24            Paraspinal STM PK IASTM            PA Mobs                                       Neuro Re-Ed 6/24            TA Bracing 5x10'            Sciatic Nerve Glide 15x            Clams             Bridge 5x5'                                                   Ther Ex             Bike             REIL             Prone Press Ups 5x10' on elbows            Hip Extension w/ multifidi firing             Open Books             Hamstring Str                                       Ther Activity 6/24            Squatting                          Gait Training                                       Modalities

## 2022-06-29 ENCOUNTER — OFFICE VISIT (OUTPATIENT)
Dept: PHYSICAL THERAPY | Facility: CLINIC | Age: 62
End: 2022-06-29
Payer: COMMERCIAL

## 2022-06-29 DIAGNOSIS — M54.50 ACUTE LEFT-SIDED LOW BACK PAIN, UNSPECIFIED WHETHER SCIATICA PRESENT: Primary | ICD-10-CM

## 2022-06-29 PROCEDURE — 97110 THERAPEUTIC EXERCISES: CPT

## 2022-06-29 NOTE — PROGRESS NOTES
Daily Note     Today's date: 2022  Patient name: Best Flanagan  : 1960  MRN: [id number]  Referring provider: Richie Yi, PT  Dx:   Encounter Diagnosis     ICD-10-CM    1  Acute left-sided low back pain, unspecified whether sciatica present  M54 50        Start Time: 1415  Stop Time: 1450  Total time in clinic (min): 35 minutes    Subjective: Pt reports she is feeling So-so today, reports she has some LB and hip pain today  Objective: See treatment diary below      Assessment: Tolerated treatment well  Patient demonstrated fatigue post treatment and would benefit from continued PT  Pt performed exercises as noted with no signs of increased pain or adverse symptoms  Pt shows reduction of symptoms post extension exercises  Pt will benefit from further skilled PT to increase strength, flexibility and function  Continue to progress as able  Plan: Continue per plan of care        Precautions: high risk, Italian speaking    Date            Visit # 1 2           FOTO 42/61             Re-eval IE              Manuals            Paraspinal STM PK IASTM            PA Mobs                                       Neuro Re-Ed            TA Bracing 5x10' 10x10"           Sciatic Nerve Glide 15x 15x           Clams  nv           Bridge 5x5' 10x5"                                                  Ther Ex             Bike  6'           REIL  10x10"           Prone Press Ups 5x10' on elbows 10x10" elbows           Hip Extension w/ multifidi firing  2x10           Open Books  nv           Hamstring Str  3x30"                                     Ther Activity            Squatting  2x10                        Gait Training                                       Modalities

## 2022-07-06 ENCOUNTER — OFFICE VISIT (OUTPATIENT)
Dept: PHYSICAL THERAPY | Facility: CLINIC | Age: 62
End: 2022-07-06
Payer: COMMERCIAL

## 2022-07-06 DIAGNOSIS — M54.50 ACUTE LEFT-SIDED LOW BACK PAIN, UNSPECIFIED WHETHER SCIATICA PRESENT: Primary | ICD-10-CM

## 2022-07-06 PROCEDURE — 97112 NEUROMUSCULAR REEDUCATION: CPT

## 2022-07-06 PROCEDURE — 97110 THERAPEUTIC EXERCISES: CPT

## 2022-07-06 NOTE — PROGRESS NOTES
Daily Note     Today's date: 2022  Patient name: Moose Oliver  : 1960  MRN: [id number]  Referring provider: Carlos Coello PT  Dx:   Encounter Diagnosis     ICD-10-CM    1  Acute left-sided low back pain, unspecified whether sciatica present  M54 50        Start Time: 1425  Stop Time: 1505  Total time in clinic (min): 40 minutes    Subjective: Pt reports she is feeling So-so today, Pt state her pain is in the L hip at the moment  Pt reports copays are too expensive and requested to drop down to 1visit /week  Objective: See treatment diary below      Assessment: Tolerated treatment well  Patient demonstrated fatigue post treatment and would benefit from continued PT  Pt performed exercises as noted with no signs of increased pain or adverse symptoms  Pt educated on HEP and importance of continuation of HEP if starting therapy at 1/week  Continue to progress as able      Plan: Continue per plan of care        Precautions: high risk, Turkmen speaking    Date           Visit # 1 2 3          FOTO 42/61             Re-eval IE              Manuals           Paraspinal STM PK IASTM            PA Mobs                                       Neuro Re-Ed  7          TA Bracing 5x10' 10x10" 15x10"          Sciatic Nerve Glide 15x 15x 20x          Clams  nv           Bridge 5x5' 10x5" 20x5"          TA w march   2x10          SLR   2x10                       Ther Ex            Bike  6' 6'          REIL  10x10" 10x10"          Prone Press Ups 5x10' on elbows 10x10" elbows 10x10"          Hip Extension w/ multifidi firing  2x10 2x10          Open Books  nv 10x10"          Hamstring Str  3x30" 3x30"                                    Ther Activity           Squatting  2x10 2x10          Step ups    1R 2x10          Gait Training                                       Modalities

## 2022-07-08 ENCOUNTER — APPOINTMENT (OUTPATIENT)
Dept: PHYSICAL THERAPY | Facility: CLINIC | Age: 62
End: 2022-07-08
Payer: COMMERCIAL

## 2022-07-13 ENCOUNTER — APPOINTMENT (OUTPATIENT)
Dept: PHYSICAL THERAPY | Facility: CLINIC | Age: 62
End: 2022-07-13
Payer: COMMERCIAL

## 2022-07-15 ENCOUNTER — APPOINTMENT (OUTPATIENT)
Dept: LAB | Facility: HOSPITAL | Age: 62
End: 2022-07-15
Payer: COMMERCIAL

## 2022-07-15 ENCOUNTER — OFFICE VISIT (OUTPATIENT)
Dept: PHYSICAL THERAPY | Facility: CLINIC | Age: 62
End: 2022-07-15
Payer: COMMERCIAL

## 2022-07-15 DIAGNOSIS — G47.09 OTHER INSOMNIA: ICD-10-CM

## 2022-07-15 DIAGNOSIS — I10 ESSENTIAL HYPERTENSION: ICD-10-CM

## 2022-07-15 DIAGNOSIS — Z13.29 SCREENING FOR THYROID DISORDER: ICD-10-CM

## 2022-07-15 DIAGNOSIS — E78.2 MIXED HYPERLIPIDEMIA: ICD-10-CM

## 2022-07-15 DIAGNOSIS — M54.50 ACUTE LEFT-SIDED LOW BACK PAIN, UNSPECIFIED WHETHER SCIATICA PRESENT: Primary | ICD-10-CM

## 2022-07-15 LAB
ANION GAP SERPL CALCULATED.3IONS-SCNC: 6 MMOL/L (ref 4–13)
BASOPHILS # BLD AUTO: 0.03 THOUSANDS/ΜL (ref 0–0.1)
BASOPHILS NFR BLD AUTO: 0 % (ref 0–1)
BUN SERPL-MCNC: 17 MG/DL (ref 5–25)
CALCIUM SERPL-MCNC: 9 MG/DL (ref 8.3–10.1)
CHLORIDE SERPL-SCNC: 106 MMOL/L (ref 100–108)
CHOLEST SERPL-MCNC: 156 MG/DL
CO2 SERPL-SCNC: 30 MMOL/L (ref 21–32)
CREAT SERPL-MCNC: 0.9 MG/DL (ref 0.6–1.3)
EOSINOPHIL # BLD AUTO: 0.25 THOUSAND/ΜL (ref 0–0.61)
EOSINOPHIL NFR BLD AUTO: 4 % (ref 0–6)
ERYTHROCYTE [DISTWIDTH] IN BLOOD BY AUTOMATED COUNT: 12 % (ref 11.6–15.1)
GFR SERPL CREATININE-BSD FRML MDRD: 68 ML/MIN/1.73SQ M
GLUCOSE P FAST SERPL-MCNC: 127 MG/DL (ref 65–99)
HCT VFR BLD AUTO: 42.5 % (ref 34.8–46.1)
HDLC SERPL-MCNC: 53 MG/DL
HGB BLD-MCNC: 14.5 G/DL (ref 11.5–15.4)
IMM GRANULOCYTES # BLD AUTO: 0.01 THOUSAND/UL (ref 0–0.2)
IMM GRANULOCYTES NFR BLD AUTO: 0 % (ref 0–2)
LDLC SERPL CALC-MCNC: 83 MG/DL (ref 0–100)
LYMPHOCYTES # BLD AUTO: 2.89 THOUSANDS/ΜL (ref 0.6–4.47)
LYMPHOCYTES NFR BLD AUTO: 41 % (ref 14–44)
MCH RBC QN AUTO: 30.1 PG (ref 26.8–34.3)
MCHC RBC AUTO-ENTMCNC: 34.1 G/DL (ref 31.4–37.4)
MCV RBC AUTO: 88 FL (ref 82–98)
MONOCYTES # BLD AUTO: 0.44 THOUSAND/ΜL (ref 0.17–1.22)
MONOCYTES NFR BLD AUTO: 6 % (ref 4–12)
NEUTROPHILS # BLD AUTO: 3.52 THOUSANDS/ΜL (ref 1.85–7.62)
NEUTS SEG NFR BLD AUTO: 49 % (ref 43–75)
NRBC BLD AUTO-RTO: 0 /100 WBCS
PLATELET # BLD AUTO: 210 THOUSANDS/UL (ref 149–390)
PMV BLD AUTO: 10.9 FL (ref 8.9–12.7)
POTASSIUM SERPL-SCNC: 4.2 MMOL/L (ref 3.5–5.3)
RBC # BLD AUTO: 4.82 MILLION/UL (ref 3.81–5.12)
SODIUM SERPL-SCNC: 142 MMOL/L (ref 136–145)
TRIGL SERPL-MCNC: 100 MG/DL
TSH SERPL DL<=0.05 MIU/L-ACNC: 3.52 UIU/ML (ref 0.45–4.5)
WBC # BLD AUTO: 7.14 THOUSAND/UL (ref 4.31–10.16)

## 2022-07-15 PROCEDURE — 97110 THERAPEUTIC EXERCISES: CPT | Performed by: PHYSICAL THERAPIST

## 2022-07-15 PROCEDURE — 80061 LIPID PANEL: CPT

## 2022-07-15 PROCEDURE — 85025 COMPLETE CBC W/AUTO DIFF WBC: CPT

## 2022-07-15 PROCEDURE — 97530 THERAPEUTIC ACTIVITIES: CPT | Performed by: PHYSICAL THERAPIST

## 2022-07-15 PROCEDURE — 36415 COLL VENOUS BLD VENIPUNCTURE: CPT

## 2022-07-15 PROCEDURE — 80048 BASIC METABOLIC PNL TOTAL CA: CPT

## 2022-07-15 PROCEDURE — 84443 ASSAY THYROID STIM HORMONE: CPT

## 2022-07-15 NOTE — PROGRESS NOTES
Daily Note     Today's date: 7/15/2022  Patient name: Taylor Merida  : 1960  MRN: [id number]  Referring provider: Wesly Anton PT  Dx:   Encounter Diagnosis     ICD-10-CM    1  Acute left-sided low back pain, unspecified whether sciatica present  M54 50                   Subjective: Pt reports some back pain and L sided leg pain present today  Objective: See treatment diary below      Assessment: Pt is challenged w addition of UTR activity and requires frequent cues for proper form and TA activation  She is challenged w TA activation and core strengthening activities  She trials lumbar gapping with some pain and stiffness, but trunk rotation AROM is improved following  Patient demonstrated fatigue post treatment and would benefit from continued PT  Plan: Continue per plan of care  Progress treatment as tolerated         Precautions: high risk, [place]ican speaking    Date 6/24 6/29 7/6 7/15         Visit # 1 2 3 4         FOTO              Re-eval IE              Manuals 6/24 6/29 7/6 7/15         Paraspinal STM PK IASTM            PA Mobs                                       Neuro Re-Ed 6/24 6/29 7/6 7/15         TA Bracing 5x10' 10x10" 15x10" 10x10" w add sq         Sciatic Nerve Glide 15x 15x 20x          Clams  nv           Bridge 5x5' 10x5" 20x5" 2x10         TA w march   2x10          SLR   2x10 2x10                      Ther Ex  6/29 7/6 7/15         Bike  6' 6' 6'         REIL  10x10" 10x10" 10x10"         Prone Press Ups 5x10' on elbows 10x10" elbows 10x10"          Hip Extension w/ multifidi firing  2x10 2x10          Open Books  nv 10x10" 10x10"         Hamstring Str  3x30" 3x30" 3x30"         Leg Press    20x 85#                      Ther Activity 6/24 6/29 7/6 7/15         Squatting  2x10 2x10 2x10         Step ups    1R 2x10 1R 2x10         UTR    15x 7#                      Gait Training                                       Modalities

## 2022-07-18 ENCOUNTER — OFFICE VISIT (OUTPATIENT)
Dept: FAMILY MEDICINE CLINIC | Facility: CLINIC | Age: 62
End: 2022-07-18
Payer: COMMERCIAL

## 2022-07-18 VITALS
SYSTOLIC BLOOD PRESSURE: 110 MMHG | HEIGHT: 64 IN | OXYGEN SATURATION: 98 % | BODY MASS INDEX: 28.85 KG/M2 | HEART RATE: 70 BPM | WEIGHT: 169 LBS | DIASTOLIC BLOOD PRESSURE: 70 MMHG | TEMPERATURE: 98.8 F

## 2022-07-18 DIAGNOSIS — E78.2 MIXED HYPERLIPIDEMIA: ICD-10-CM

## 2022-07-18 DIAGNOSIS — I10 ESSENTIAL HYPERTENSION: ICD-10-CM

## 2022-07-18 DIAGNOSIS — E03.9 ACQUIRED HYPOTHYROIDISM: ICD-10-CM

## 2022-07-18 DIAGNOSIS — R10.12 LUQ PAIN: Primary | ICD-10-CM

## 2022-07-18 DIAGNOSIS — T30.0 FIRST DEGREE BURN: ICD-10-CM

## 2022-07-18 DIAGNOSIS — E11.9 TYPE 2 DIABETES MELLITUS WITHOUT COMPLICATION, WITHOUT LONG-TERM CURRENT USE OF INSULIN (HCC): ICD-10-CM

## 2022-07-18 LAB — SL AMB POCT HEMOGLOBIN AIC: 6.2 (ref ?–6.5)

## 2022-07-18 PROCEDURE — 99215 OFFICE O/P EST HI 40 MIN: CPT | Performed by: FAMILY MEDICINE

## 2022-07-18 PROCEDURE — 83036 HEMOGLOBIN GLYCOSYLATED A1C: CPT | Performed by: FAMILY MEDICINE

## 2022-07-18 PROCEDURE — 3044F HG A1C LEVEL LT 7.0%: CPT | Performed by: INTERNAL MEDICINE

## 2022-07-19 PROBLEM — T30.0 FIRST DEGREE BURN: Status: ACTIVE | Noted: 2022-07-19

## 2022-07-19 PROBLEM — T30.0 FIRST DEGREE BURN: Status: ACTIVE | Noted: 2022-07-18

## 2022-07-19 PROBLEM — R10.12 LUQ PAIN: Status: ACTIVE | Noted: 2022-07-18

## 2022-07-19 PROBLEM — R10.12 LUQ PAIN: Status: ACTIVE | Noted: 2022-07-19

## 2022-07-19 NOTE — ASSESSMENT & PLAN NOTE
The first-degree burn on her left the upper forearm size 3 cm x 10 proper care review with the patient recommend to apply Silvadene cream 1% 3 times a day for 7 days [facility] get worse swelling to call the office

## 2022-07-19 NOTE — ASSESSMENT & PLAN NOTE
A new diagnosis symptomatic pain in the left upper quadrant her and patient had the history of hernia repair a not associated with any vomiting diarrhea weight loss or fever not related to the diet discussed with the patient food diary recommend omeprazole 20 mg once a day per patient she has been taking Prilosec over-the-counter plan for ultrasound of the left upper quadrant her

## 2022-07-19 NOTE — ASSESSMENT & PLAN NOTE
A chronic asymptomatic LDL sub therapeutic in diabetic patient level 83 on recent blood work discussed the patient recommend the a continue atorvastatin 20 mg once a day low-fat diet review with the patient

## 2022-07-19 NOTE — PROGRESS NOTES
Subjective:   Chief Complaint   Patient presents with    Follow-up     Chronic conditions    Abdominal Pain     Where she had hernia surgery 3 years ago        Patient ID: Cuauhtemoc Moses is a 60 y o  female  The patient here follow-up with a chronic condition had multiple concern she concerned about burn on her left the forearm happen today why she was a taking foot out of the event it is red painful and mild swelling  Patient also complained about pain in her left upper quadrant her it comes and goes no abdomen distension no nausea no vomiting no diarrhea no constipation no weight loss no fever no recent travel a she does not feel it is related to the food the patient had a similar pain 3 years ago and she had history of hernia repair patient history of diabetic hypertension hyperlipidemia compliant with the medication recent blood work review with patient      The following portions of the patient's history were reviewed and updated as appropriate: allergies, current medications, past family history, past medical history, past social history, past surgical history and problem list     Review of Systems   Constitutional: Negative for activity change, appetite change, fatigue and fever  HENT: Negative for congestion, ear pain, sinus pressure, sinus pain and sore throat  Eyes: Negative for pain, discharge, redness and itching  Respiratory: Negative for cough, chest tightness, shortness of breath and stridor  Cardiovascular: Negative for chest pain, palpitations and leg swelling  Gastrointestinal: Positive for abdominal pain  Negative for blood in stool, constipation, diarrhea and nausea  Genitourinary: Negative for dysuria, flank pain, frequency and hematuria  Musculoskeletal: Negative for back pain, joint swelling and neck pain  Skin:        Burn    Neurological: Negative for dizziness, tremors, weakness, numbness and headaches  Hematological: Does not bruise/bleed easily  Objective:  Vitals:    07/18/22 1454   BP: 110/70   Pulse: 70   Temp: 98 8 °F (37 1 °C)   TempSrc: Tympanic   SpO2: 98%   Weight: 76 7 kg (169 lb)   Height: 5' 3 5" (1 613 m)      Physical Exam  Vitals and nursing note reviewed  Constitutional:       General: She is not in acute distress  Appearance: She is well-developed  She is not diaphoretic  HENT:      Head: Normocephalic  Right Ear: Tympanic membrane, ear canal and external ear normal       Left Ear: Tympanic membrane, ear canal and external ear normal       Nose: Nose normal  No congestion or rhinorrhea  Mouth/Throat:      Mouth: Mucous membranes are moist       Pharynx: Oropharynx is clear  No oropharyngeal exudate or posterior oropharyngeal erythema  Eyes:      General:         Right eye: No discharge  Left eye: No discharge  Conjunctiva/sclera: Conjunctivae normal       Pupils: Pupils are equal, round, and reactive to light  Neck:      Vascular: No JVD  Cardiovascular:      Rate and Rhythm: Normal rate and regular rhythm  Heart sounds: Normal heart sounds  No murmur heard  No gallop  Pulmonary:      Effort: Pulmonary effort is normal  No respiratory distress  Breath sounds: Normal breath sounds  No stridor  No wheezing or rales  Chest:      Chest wall: No tenderness  Abdominal:      General: There is no distension  Palpations: Abdomen is soft  There is no mass  Tenderness: There is abdominal tenderness in the left upper quadrant  There is no guarding or rebound  Musculoskeletal:         General: No tenderness  Cervical back: Normal range of motion and neck supple  Lymphadenopathy:      Cervical: No cervical adenopathy  Skin:     General: Skin is warm  Findings: Erythema present  Neurological:      Mental Status: She is alert and oriented to person, place, and time  Motor: No weakness        Gait: Gait normal            Assessment/Plan:    LUQ pain  A new diagnosis symptomatic pain in the left upper quadrant her and patient had the history of hernia repair a not associated with any vomiting diarrhea weight loss or fever not related to the diet discussed with the patient food diary recommend omeprazole 20 mg once a day per patient she has been taking Prilosec over-the-counter plan for ultrasound of the left upper quadrant her    First degree burn  The first-degree burn on her left the upper forearm size 3 cm x 10 proper care review with the patient recommend to apply Silvadene cream 1% 3 times a day for 7 days [facility] get worse swelling to call the office    Type 2 diabetes mellitus without complication, without long-term current use of insulin [facility]    Lab Results   Component Value Date    HGBA1C 6 2 07/18/2022     A chronic asymptomatic fair control continue current management metformin 500 mg once a day patient already on statin a discussed the low carb diet important lose weight patient up-to-date with diabetic eye in foot exam    Hypothyroidism  Chronic asymptomatic continue current management the levothyroxine 112 mcg once a day    Essential hypertension  Chronic asymptomatic fair control continue low-salt diet increase physical activity and discussed important lose weight    Mixed hyperlipidemia  A chronic asymptomatic LDL sub therapeutic in diabetic patient level 83 on recent blood work discussed the patient recommend the a continue atorvastatin 20 mg once a day low-fat diet review with the patient       Diagnoses and all orders for this visit:    LUQ pain  -     US left upper quadrant; Future    First degree burn  -     silver sulfadiazine (SILVADENE,SSD) 1 % cream; Apply topically daily    Type 2 diabetes mellitus without complication, without long-term current use of insulin ([facility])  -     POCT hemoglobin A1c  -     Comprehensive metabolic panel; Future  -     CBC and differential; Future  -     Lipid panel;  Future  -     TSH, 3rd generation with Free T4 reflex; Future  -     Hemoglobin A1C; Future    Acquired hypothyroidism  -     Comprehensive metabolic panel; Future  -     CBC and differential; Future  -     Lipid panel; Future  -     TSH, 3rd generation with Free T4 reflex; Future  -     Hemoglobin A1C; Future    Mixed hyperlipidemia  -     Comprehensive metabolic panel; Future  -     CBC and differential; Future  -     Lipid panel; Future  -     TSH, 3rd generation with Free T4 reflex;  Future  -     Hemoglobin A1C; Future    Essential hypertension

## 2022-07-19 NOTE — ASSESSMENT & PLAN NOTE
Lab Results   Component Value Date    HGBA1C 6 2 07/18/2022     A chronic asymptomatic fair control continue current management metformin 500 mg once a day patient already on statin a discussed the low carb diet important lose weight patient up-to-date with diabetic eye in foot exam

## 2022-07-19 NOTE — ASSESSMENT & PLAN NOTE
Chronic asymptomatic fair control continue low-salt diet increase physical activity and discussed important lose weight

## 2022-07-20 ENCOUNTER — HOSPITAL ENCOUNTER (OUTPATIENT)
Dept: ULTRASOUND IMAGING | Facility: HOSPITAL | Age: 62
Discharge: HOME | End: 2022-07-20
Payer: COMMERCIAL

## 2022-07-20 DIAGNOSIS — R10.12 LUQ PAIN: ICD-10-CM

## 2022-07-20 PROCEDURE — 76705 ECHO EXAM OF ABDOMEN: CPT

## 2022-07-24 DIAGNOSIS — E11.9 TYPE 2 DIABETES MELLITUS WITHOUT COMPLICATION, WITHOUT LONG-TERM CURRENT USE OF INSULIN (HCC): ICD-10-CM

## 2022-07-26 ENCOUNTER — OFFICE VISIT (OUTPATIENT)
Dept: FAMILY MEDICINE CLINIC | Facility: CLINIC | Age: 62
End: 2022-07-26
Payer: COMMERCIAL

## 2022-07-26 VITALS
DIASTOLIC BLOOD PRESSURE: 80 MMHG | BODY MASS INDEX: 28.68 KG/M2 | TEMPERATURE: 98 F | HEIGHT: 64 IN | OXYGEN SATURATION: 97 % | WEIGHT: 168 LBS | SYSTOLIC BLOOD PRESSURE: 120 MMHG | HEART RATE: 87 BPM

## 2022-07-26 DIAGNOSIS — R10.84 GENERALIZED ABDOMINAL PAIN: Primary | ICD-10-CM

## 2022-07-26 LAB
AMORPH URATE CRY URNS QL MICRO: ABNORMAL
BACTERIA UR QL AUTO: ABNORMAL /HPF
BILIRUB UR QL STRIP: NEGATIVE
CLARITY UR: ABNORMAL
COLOR UR: YELLOW
GLUCOSE UR STRIP-MCNC: NEGATIVE MG/DL
HGB UR QL STRIP.AUTO: NEGATIVE
KETONES UR STRIP-MCNC: NEGATIVE MG/DL
LEUKOCYTE ESTERASE UR QL STRIP: NEGATIVE
MUCOUS THREADS UR QL AUTO: ABNORMAL
NITRITE UR QL STRIP: NEGATIVE
NON-SQ EPI CELLS URNS QL MICRO: ABNORMAL /HPF
PH UR STRIP.AUTO: 5.5 [PH]
PROT UR STRIP-MCNC: ABNORMAL MG/DL
RBC #/AREA URNS AUTO: ABNORMAL /HPF
SL AMB  POCT GLUCOSE, UA: ABNORMAL
SL AMB LEUKOCYTE ESTERASE,UA: ABNORMAL
SL AMB POCT BILIRUBIN,UA: ABNORMAL
SL AMB POCT BLOOD,UA: ABNORMAL
SL AMB POCT CLARITY,UA: CLEAR
SL AMB POCT COLOR,UA: YELLOW
SL AMB POCT KETONES,UA: ABNORMAL
SL AMB POCT NITRITE,UA: ABNORMAL
SL AMB POCT PH,UA: 5
SL AMB POCT SPECIFIC GRAVITY,UA: 1.03
SL AMB POCT URINE PROTEIN: ABNORMAL
SL AMB POCT UROBILINOGEN: 0.2
SP GR UR STRIP.AUTO: 1.02 (ref 1–1.03)
UROBILINOGEN UR STRIP-ACNC: <2 MG/DL
WBC #/AREA URNS AUTO: ABNORMAL /HPF

## 2022-07-26 PROCEDURE — 99214 OFFICE O/P EST MOD 30 MIN: CPT | Performed by: FAMILY MEDICINE

## 2022-07-26 PROCEDURE — 3725F SCREEN DEPRESSION PERFORMED: CPT | Performed by: FAMILY MEDICINE

## 2022-07-26 PROCEDURE — 81002 URINALYSIS NONAUTO W/O SCOPE: CPT | Performed by: FAMILY MEDICINE

## 2022-07-26 PROCEDURE — 3061F NEG MICROALBUMINURIA REV: CPT | Performed by: INTERNAL MEDICINE

## 2022-07-26 PROCEDURE — 87086 URINE CULTURE/COLONY COUNT: CPT | Performed by: FAMILY MEDICINE

## 2022-07-26 PROCEDURE — 81001 URINALYSIS AUTO W/SCOPE: CPT | Performed by: FAMILY MEDICINE

## 2022-07-27 ENCOUNTER — CONSULT (OUTPATIENT)
Dept: GASTROENTEROLOGY | Facility: CLINIC | Age: 62
End: 2022-07-27
Payer: COMMERCIAL

## 2022-07-27 VITALS
OXYGEN SATURATION: 98 % | BODY MASS INDEX: 28.85 KG/M2 | HEIGHT: 64 IN | SYSTOLIC BLOOD PRESSURE: 118 MMHG | HEART RATE: 72 BPM | DIASTOLIC BLOOD PRESSURE: 60 MMHG | WEIGHT: 169 LBS | TEMPERATURE: 97.6 F

## 2022-07-27 DIAGNOSIS — Z00.00 PREVENTATIVE HEALTH CARE: ICD-10-CM

## 2022-07-27 DIAGNOSIS — R10.13 DYSPEPSIA: Primary | ICD-10-CM

## 2022-07-27 DIAGNOSIS — R10.84 GENERALIZED ABDOMINAL PAIN: ICD-10-CM

## 2022-07-27 LAB — BACTERIA UR CULT: NORMAL

## 2022-07-27 PROCEDURE — 99204 OFFICE O/P NEW MOD 45 MIN: CPT | Performed by: INTERNAL MEDICINE

## 2022-07-27 PROCEDURE — 3074F SYST BP LT 130 MM HG: CPT | Performed by: INTERNAL MEDICINE

## 2022-07-27 PROCEDURE — 3078F DIAST BP <80 MM HG: CPT | Performed by: INTERNAL MEDICINE

## 2022-07-27 RX ORDER — PANTOPRAZOLE SODIUM 40 MG/1
40 TABLET, DELAYED RELEASE ORAL DAILY
Qty: 30 TABLET | Refills: 2 | Status: SHIPPED | OUTPATIENT
Start: 2022-07-27 | End: 2022-10-21

## 2022-07-27 NOTE — PROGRESS NOTES
[facility] - Outpatient Consultation  Tomasz 60 y o  female MRN: [id number]  Encounter: [id number]          ASSESSMENT AND PLAN:      1  Generalized abdominal pain  2  Dyspepsia  3  GERD    Patient has acute symptoms for the last 2 weeks  No change in symptoms with meal intake  Unsure of etiology  She denies NSAID intake  At this time will get CT abdomen with contrast, H pylori antibody to investigate further  If workup unrevealing then would pursue EGD  Will start patient on pantoprazole 40 mg daily as well  Patient agreeable with plan of care  4  Preventative healthcare  Check hepatitis-C antibody  RTC 3 months  Ariel Gomez MD  Gastroenterology  [place] [address]  Date: July 27, 2022    - CT abdomen pelvis w contrast; Future  - H  pylori antibody, IgG; Future  - Hepatitis C antibody; Future    ______________________________________________________________________    HPI:  78-year-old with DM, hypothyroidism, hyperlipidemia, status post cholecystectomy presents for evaluation of abdominal pain  Patient says that she has been having abdominal pain at least for the last 2 weeks intermittently throughout every day during the period  She feels like having a bowel movement but when she goes to the bathroom nothing comes out  She usually has to wake up at night with pain on the left side  She describes her bowel movements as being normal consistency and [place] stool scale 4  No blood in stools  She also has bloating for the last 2 weeks  She has weight gain over the last 1 year  No family history of colon cancer  No NSAID or alcohol intake  Nonsmoker  Her last colonoscopy was in 2015 which was normal and repeat in 10 years was recommended  Labs done earlier this month show normal CBC, renal function and LFTs      Ultrasound left upper quadrant done recently was normal   CT abdomen with contrast done 1 year ago showed dilated CBD as expected status post cholecystectomy  REVIEW OF SYSTEMS:    CONSTITUTIONAL: Denies any fever, chills, rigors, and weight loss  HEENT: No earache or tinnitus  Denies hearing loss or visual disturbances  CARDIOVASCULAR: No chest pain or palpitations  RESPIRATORY: Denies any cough, hemoptysis, shortness of breath or dyspnea on exertion  GASTROINTESTINAL: As noted in the History of Present Illness  GENITOURINARY: No problems with urination  Denies any hematuria or dysuria  NEUROLOGIC: No dizziness or vertigo, denies headaches  MUSCULOSKELETAL: Denies any muscle or joint pain  SKIN: Denies skin rashes or itching  ENDOCRINE: Denies excessive thirst  Denies intolerance to heat or cold  PSYCHOSOCIAL: Denies depression or anxiety  Denies any recent memory loss         Historical Information   Past Medical History:   Diagnosis Date    Anxiety 3/29/2019    Asthma     Cataract     viral    Chronic kidney disease     Hyperlipidemia     Hypertension     Hypothyroid     IFG (impaired fasting glucose) 2019    Incisional hernia     Insomnia     Kidney stone     in past    Left upper quadrant pain 2019    Lower abdominal pain 2020    Osteoarthritis     RA (rheumatoid arthritis) ([facility] ) 2015    pt unsure     Type 2 diabetes mellitus without complication, without long-term current use of insulin ([facility] ) 2019    NIDDM    Uses Vietnamese as primary spoken language     Varicose veins of both lower extremities     Wears dentures      upper partials    Wears glasses      Past Surgical History:   Procedure Laterality Date    APPENDECTOMY      BREAST BIOPSY Right     negative     SECTION      x 3    CHOLECYSTECTOMY      COLONOSCOPY      HERNIA REPAIR      HYSTERECTOMY      MN LAP, INCISIONAL HERNIA REPAIR,REDUCIBLE N/A 2019    Procedure: REPAIR HERNIA INCISIONAL LAPAROSCOPIC W/ ROBOTICS;  Surgeon: Gege Tiwari MD;  Location: [facility] OR;  Service: General     Social History   Social History     Substance and Sexual Activity   Alcohol Use Yes    Comment: liquor     Social History     Substance and Sexual Activity   Drug Use No     Social History     Tobacco Use   Smoking Status Never Smoker   Smokeless Tobacco Never Used   Tobacco Comment    no passive smoke exposure     Family History   Problem Relation Age of Onset    Emphysema Mother     No Known Problems Sister     No Known Problems Daughter     No Known Problems Sister     No Known Problems Sister     No Known Problems Sister     No Known Problems Paternal Aunt     No Known Problems Maternal Aunt     No Known Problems Maternal Aunt        Meds/Allergies       Current Outpatient Medications:     atorvastatin (LIPITOR) 20 mg tablet    Lancets (ONETOUCH ULTRASOFT) lancets    levothyroxine 112 mcg tablet    lidocaine (Lidoderm) 5 %    metFORMIN (GLUCOPHAGE) 500 mg tablet    methocarbamol (ROBAXIN) 500 mg tablet    naproxen (NAPROSYN) 500 mg tablet    OneTouch Verio test strip    pantoprazole (PROTONIX) 40 mg tablet    pregabalin (LYRICA) 75 mg capsule    silver sulfadiazine (SILVADENE,SSD) 1 % cream    traZODone (DESYREL) 50 mg tablet    No Known Allergies        Objective     Blood pressure 118/60, pulse 72, temperature 97 6 °F (36 4 °C), temperature source Tympanic, height 5' 3 5" (1 613 m), weight 76 7 kg (169 lb), SpO2 98 %, not currently breastfeeding  Body mass index is 29 47 kg/m²  PHYSICAL EXAM:      General Appearance:   Alert, cooperative, no distress   HEENT:   Normocephalic, atraumatic, anicteric      Neck:  Supple, symmetrical, trachea midline   Lungs:   Clear to auscultation bilaterally; no rales, rhonchi or wheezing; respirations unlabored    Heart[de-identified]   Regular rate and rhythm; no murmur, rub, or gallop     Abdomen:   Soft, non-tender, non-distended; normal bowel sounds; no masses, no organomegaly    Genitalia:   Deferred    Rectal:   Deferred    Extremities:  No cyanosis, clubbing or edema    Pulses:  2+ and symmetric    Skin:  No jaundice, rashes, or lesions    Lymph nodes:  No palpable cervical lymphadenopathy        Lab Results:   No visits with results within 1 Day(s) from this visit  Latest known visit with results is:   Office Visit on 07/26/2022   Component Date Value    Urine Culture 07/26/2022 No Growth <1000 cfu/mL     Color, UA 07/26/2022 Yellow     Clarity, UA 07/26/2022 Extra Turbid     Specific Gravity, UA 07/26/2022 1 024     pH, UA 07/26/2022 5 5     Leukocytes, UA 07/26/2022 Negative     Nitrite, UA 07/26/2022 Negative     Protein, UA 07/26/2022 Trace (A)    Glucose, UA 07/26/2022 Negative     Ketones, UA 07/26/2022 Negative     Urobilinogen, UA 07/26/2022 <2 0     Bilirubin, UA 07/26/2022 Negative     Occult Blood, UA 07/26/2022 Negative     LEUKOCYTE ESTERASE,UA 07/26/2022 neg     NITRITE,UA 07/26/2022 neg     SL AMB POCT UROBILINOGEN 07/26/2022 0 2     POCT URINE PROTEIN 07/26/2022 trace      PH,UA 07/26/2022 5 0     BLOOD,UA 07/26/2022 mod     SPECIFIC GRAVITY,UA 07/26/2022 1 030     KETONES,UA 07/26/2022 neg     BILIRUBIN,UA 07/26/2022 small     GLUCOSE, UA 07/26/2022 neg      COLOR,UA 07/26/2022 yellow     CLARITY,UA 07/26/2022 clear     RBC, UA 07/26/2022 None Seen     WBC, UA 07/26/2022 None Seen     Epithelial Cells 07/26/2022 None Seen     Bacteria, UA 07/26/2022 None Seen     MUCUS THREADS 07/26/2022 Innumerable (A)    Amorphous Crystals, UA 07/26/2022 Occasional          Radiology Results:   US left upper quadrant    Result Date: 7/25/2022  Narrative: LEFT UPPER QUADRANT ULTRASOUND INDICATION: R10 12: Left upper quadrant pain  COMPARISON:  CT 7/11/2021  TECHNIQUE:   Real-time ultrasound of the abdomen was performed with a curvilinear transducer with both volumetric sweeps and still imaging techniques  FINDINGS: SPLEEN:  9 3 x 10 1 x 2 7 cm  Volume:133 8 Normal in size and contour  No mass   No perisplenic collections  LEFT KIDNEY:  11 5 x 4 8 x 5 0 cm  Volume: 144 8 ML Normal caliber and echogenicity  No hydronephrosis  No nephrolithiasis  No solid mass lesions  ABDOMINAL CAVITY: No ascites  No loculated collections       Impression: Normal  Workstation performed: [id number]

## 2022-07-28 PROBLEM — R10.84 GENERALIZED ABDOMINAL PAIN: Status: ACTIVE | Noted: 2022-07-26

## 2022-07-28 PROBLEM — R10.84 GENERALIZED ABDOMINAL PAIN: Status: ACTIVE | Noted: 2022-07-28

## 2022-07-28 NOTE — PROGRESS NOTES
Subjective:   Chief Complaint   Patient presents with    Follow-up     Llq pain        Patient ID: Jordin Fernandez is a 60 y o  female  The patient here concerned on her abdomen pain started in the left upper quadrant her and now she feel the pain is generalized in the abdomen she feel bloated the even she is not eating lots and no nausea no vomiting no diarrhea no constipation no blood in the stool no fever no recent travel patient had history of hernia repair recent ultrasound of the left upper quadrant her review with the patient      The following portions of the patient's history were reviewed and updated as appropriate: allergies, current medications, past family history, past medical history, past social history, past surgical history and problem list     Review of Systems   Constitutional: Negative for chills and fever  HENT: Negative for ear pain and sore throat  Eyes: Negative for pain and visual disturbance  Respiratory: Negative for cough and shortness of breath  Cardiovascular: Negative for chest pain and palpitations  Gastrointestinal: Positive for abdominal pain  Negative for blood in stool, constipation, diarrhea, nausea, rectal pain and vomiting  Genitourinary: Negative for dysuria and hematuria  Musculoskeletal: Negative for arthralgias and back pain  Skin: Negative for color change and rash  Neurological: Negative for seizures and syncope  All other systems reviewed and are negative  Objective:  Vitals:    07/26/22 1049   BP: 120/80   Pulse: 87   Temp: 98 °F (36 7 °C)   TempSrc: Tympanic   SpO2: 97%   Weight: 76 2 kg (168 lb)   Height: 5' 3 5" (1 613 m)      Physical Exam  Vitals and nursing note reviewed  Constitutional:       General: She is not in acute distress  Appearance: She is well-developed  HENT:      Head: Normocephalic and atraumatic  Mouth/Throat:      Pharynx: Oropharynx is clear     Eyes:      Conjunctiva/sclera: Conjunctivae normal    Neck:      Vascular: No JVD  Cardiovascular:      Rate and Rhythm: Normal rate and regular rhythm  Heart sounds: Normal heart sounds  No murmur heard  No friction rub  No gallop  Pulmonary:      Effort: Pulmonary effort is normal       Breath sounds: Normal breath sounds  Abdominal:      General: Bowel sounds are normal       Palpations: Abdomen is soft  There is no mass  Tenderness: There is no abdominal tenderness  There is no right CVA tenderness, left CVA tenderness, guarding or rebound  Hernia: No hernia is present  Neurological:      Mental Status: She is oriented to person, place, and time  Assessment/Plan:    Generalized abdominal pain  Asymptomatic patient who have pain in her left upper quadrant her now generalized stool the abdomen she feel her abdomen and the bloating patient had the history of hernia repair   Recently had and the ultrasound of the left upper quadrant her no abnormal finding a discussed the patient to eat small meal do not eat and lie down patient will prefer to be evaluated by GI       Diagnoses and all orders for this visit:    Generalized abdominal pain  -     Urine culture  -     UA (URINE) with reflex to Scope  -     POCT urine dip  -     Ambulatory Referral to Gastroenterology;  Future  -     Urine Microscopic

## 2022-07-28 NOTE — ASSESSMENT & PLAN NOTE
Asymptomatic patient who have pain in her left upper quadrant her now generalized stool the abdomen she feel her abdomen and the bloating patient had the history of hernia repair   Recently had and the ultrasound of the left upper quadrant her no abnormal finding a discussed the patient to eat small meal do not eat and lie down patient will prefer to be evaluated by GI

## 2022-07-29 ENCOUNTER — APPOINTMENT (OUTPATIENT)
Dept: LAB | Facility: HOSPITAL | Age: 62
End: 2022-07-29
Payer: COMMERCIAL

## 2022-07-29 ENCOUNTER — APPOINTMENT (OUTPATIENT)
Dept: PHYSICAL THERAPY | Facility: CLINIC | Age: 62
End: 2022-07-29
Payer: COMMERCIAL

## 2022-07-29 DIAGNOSIS — R10.84 GENERALIZED ABDOMINAL PAIN: ICD-10-CM

## 2022-07-29 DIAGNOSIS — R10.13 DYSPEPSIA: ICD-10-CM

## 2022-07-29 DIAGNOSIS — Z00.00 PREVENTATIVE HEALTH CARE: ICD-10-CM

## 2022-07-29 DIAGNOSIS — E11.9 TYPE 2 DIABETES MELLITUS WITHOUT COMPLICATION, WITHOUT LONG-TERM CURRENT USE OF INSULIN (HCC): ICD-10-CM

## 2022-07-29 DIAGNOSIS — E03.9 ACQUIRED HYPOTHYROIDISM: ICD-10-CM

## 2022-07-29 DIAGNOSIS — E78.2 MIXED HYPERLIPIDEMIA: ICD-10-CM

## 2022-07-29 LAB — HCV AB SER QL: NORMAL

## 2022-07-29 PROCEDURE — 86803 HEPATITIS C AB TEST: CPT

## 2022-07-29 PROCEDURE — 86677 HELICOBACTER PYLORI ANTIBODY: CPT

## 2022-07-29 PROCEDURE — 36415 COLL VENOUS BLD VENIPUNCTURE: CPT

## 2022-07-30 LAB — H PYLORI IGG SER IA-ACNC: 0.22 INDEX VALUE (ref 0–0.79)

## 2022-08-23 ENCOUNTER — HOSPITAL ENCOUNTER (OUTPATIENT)
Dept: CT IMAGING | Facility: HOSPITAL | Age: 62
Discharge: HOME | End: 2022-08-23
Attending: INTERNAL MEDICINE
Payer: COMMERCIAL

## 2022-08-23 DIAGNOSIS — R10.13 DYSPEPSIA: ICD-10-CM

## 2022-08-23 DIAGNOSIS — R10.84 GENERALIZED ABDOMINAL PAIN: ICD-10-CM

## 2022-08-23 DIAGNOSIS — Z00.00 PREVENTATIVE HEALTH CARE: ICD-10-CM

## 2022-08-23 PROCEDURE — G1004 CDSM NDSC: HCPCS

## 2022-08-23 PROCEDURE — 74177 CT ABD & PELVIS W/CONTRAST: CPT

## 2022-08-23 RX ADMIN — IOHEXOL 100 ML: 350 INJECTION, SOLUTION INTRAVENOUS at 17:12

## 2022-08-31 NOTE — RESULT ENCOUNTER NOTE
Dear staff:    Patient's CT scan shows dilated common bile duct  This can be due to stone in the bile duct which can be missed on the CT scan  I would like to get MRCP/MRI for the patient to make sure there is no stone in the common bile duct specially since patient is having abdominal pain  If patient is agreeable I will order the procedure  Please kindly communicate with patient

## 2022-09-02 ENCOUNTER — TELEPHONE (OUTPATIENT)
Dept: GASTROENTEROLOGY | Facility: CLINIC | Age: 62
End: 2022-09-02

## 2022-09-02 NOTE — TELEPHONE ENCOUNTER
----- Message from Minna Billingsley MD sent at 8/31/2022 12:33 PM EDT -----  Dear staff:    Patient's CT scan shows dilated common bile duct  This can be due to stone in the bile duct which can be missed on the CT scan  I would like to get MRCP/MRI for the patient to make sure there is no stone in the common bile duct specially since patient is having abdominal pain  If patient is agreeable I will order the procedure  Please kindly communicate with patient

## 2022-09-06 ENCOUNTER — TELEPHONE (OUTPATIENT)
Dept: GASTROENTEROLOGY | Facility: CLINIC | Age: 62
End: 2022-09-06

## 2022-09-06 NOTE — TELEPHONE ENCOUNTER
Spoke with pt via  about results and she is agreeable to MRI/MRCP but has anxiety during procedure and requested lorazepam to take prior to MRI  In the past she has had panic attacks prior to MRI       I provided her with central [facility] number

## 2022-09-07 DIAGNOSIS — R10.9 ABDOMINAL PAIN, UNSPECIFIED ABDOMINAL LOCATION: ICD-10-CM

## 2022-09-07 DIAGNOSIS — G47.09 OTHER INSOMNIA: ICD-10-CM

## 2022-09-07 DIAGNOSIS — K83.8 DILATED CBD, ACQUIRED: Primary | ICD-10-CM

## 2022-09-07 RX ORDER — LORAZEPAM 1 MG/1
1 TABLET ORAL EVERY 8 HOURS PRN
Qty: 2 TABLET | Refills: 0 | Status: SHIPPED | OUTPATIENT
Start: 2022-09-07 | End: 2022-09-08

## 2022-09-07 RX ORDER — TRAZODONE HYDROCHLORIDE 50 MG/1
TABLET ORAL
Qty: 30 TABLET | Refills: 2 | Status: SHIPPED | OUTPATIENT
Start: 2022-09-07

## 2022-10-06 ENCOUNTER — TELEPHONE (OUTPATIENT)
Dept: GASTROENTEROLOGY | Facility: CLINIC | Age: 62
End: 2022-10-06

## 2022-10-06 DIAGNOSIS — R10.84 GENERALIZED ABDOMINAL PAIN: Primary | ICD-10-CM

## 2022-10-06 NOTE — TELEPHONE ENCOUNTER
Pt stopped by to  order for BUN and Creatin  Arliss Mallard entered the order  Pt will have bw done at [facility]

## 2022-10-07 ENCOUNTER — APPOINTMENT (OUTPATIENT)
Dept: LAB | Facility: HOSPITAL | Age: 62
End: 2022-10-07
Payer: COMMERCIAL

## 2022-10-07 ENCOUNTER — TELEPHONE (OUTPATIENT)
Dept: GASTROENTEROLOGY | Facility: CLINIC | Age: 62
End: 2022-10-07

## 2022-10-07 DIAGNOSIS — E78.2 MIXED HYPERLIPIDEMIA: ICD-10-CM

## 2022-10-07 DIAGNOSIS — E11.9 TYPE 2 DIABETES MELLITUS WITHOUT COMPLICATION, WITHOUT LONG-TERM CURRENT USE OF INSULIN (HCC): ICD-10-CM

## 2022-10-07 DIAGNOSIS — E03.9 ACQUIRED HYPOTHYROIDISM: ICD-10-CM

## 2022-10-07 DIAGNOSIS — R10.84 GENERALIZED ABDOMINAL PAIN: ICD-10-CM

## 2022-10-07 LAB
ALBUMIN SERPL BCP-MCNC: 3.7 G/DL (ref 3.5–5)
ALP SERPL-CCNC: 98 U/L (ref 46–116)
ALT SERPL W P-5'-P-CCNC: 29 U/L (ref 12–78)
ANION GAP SERPL CALCULATED.3IONS-SCNC: 5 MMOL/L (ref 4–13)
AST SERPL W P-5'-P-CCNC: 31 U/L (ref 5–45)
BASOPHILS # BLD AUTO: 0.01 THOUSANDS/ΜL (ref 0–0.1)
BASOPHILS NFR BLD AUTO: 0 % (ref 0–1)
BILIRUB SERPL-MCNC: 0.75 MG/DL (ref 0.2–1)
BUN SERPL-MCNC: 13 MG/DL (ref 5–25)
CALCIUM SERPL-MCNC: 9.5 MG/DL (ref 8.3–10.1)
CHLORIDE SERPL-SCNC: 104 MMOL/L (ref 96–108)
CHOLEST SERPL-MCNC: 152 MG/DL
CO2 SERPL-SCNC: 32 MMOL/L (ref 21–32)
CREAT SERPL-MCNC: 0.69 MG/DL (ref 0.6–1.3)
EOSINOPHIL # BLD AUTO: 0.2 THOUSAND/ΜL (ref 0–0.61)
EOSINOPHIL NFR BLD AUTO: 3 % (ref 0–6)
ERYTHROCYTE [DISTWIDTH] IN BLOOD BY AUTOMATED COUNT: 12 % (ref 11.6–15.1)
EST. AVERAGE GLUCOSE BLD GHB EST-MCNC: 134 MG/DL
GFR SERPL CREATININE-BSD FRML MDRD: 92 ML/MIN/1.73SQ M
GLUCOSE P FAST SERPL-MCNC: 136 MG/DL (ref 65–99)
HBA1C MFR BLD: 6.3 %
HCT VFR BLD AUTO: 41.9 % (ref 34.8–46.1)
HDLC SERPL-MCNC: 53 MG/DL
HGB BLD-MCNC: 14.3 G/DL (ref 11.5–15.4)
IMM GRANULOCYTES # BLD AUTO: 0.02 THOUSAND/UL (ref 0–0.2)
IMM GRANULOCYTES NFR BLD AUTO: 0 % (ref 0–2)
LDLC SERPL CALC-MCNC: 74 MG/DL (ref 0–100)
LYMPHOCYTES # BLD AUTO: 2.3 THOUSANDS/ΜL (ref 0.6–4.47)
LYMPHOCYTES NFR BLD AUTO: 35 % (ref 14–44)
MCH RBC QN AUTO: 29.7 PG (ref 26.8–34.3)
MCHC RBC AUTO-ENTMCNC: 34.1 G/DL (ref 31.4–37.4)
MCV RBC AUTO: 87 FL (ref 82–98)
MONOCYTES # BLD AUTO: 0.4 THOUSAND/ΜL (ref 0.17–1.22)
MONOCYTES NFR BLD AUTO: 6 % (ref 4–12)
NEUTROPHILS # BLD AUTO: 3.58 THOUSANDS/ΜL (ref 1.85–7.62)
NEUTS SEG NFR BLD AUTO: 56 % (ref 43–75)
NONHDLC SERPL-MCNC: 99 MG/DL
NRBC BLD AUTO-RTO: 0 /100 WBCS
PLATELET # BLD AUTO: 218 THOUSANDS/UL (ref 149–390)
PMV BLD AUTO: 10.7 FL (ref 8.9–12.7)
POTASSIUM SERPL-SCNC: 4.4 MMOL/L (ref 3.5–5.3)
PROT SERPL-MCNC: 7.7 G/DL (ref 6.4–8.4)
RBC # BLD AUTO: 4.82 MILLION/UL (ref 3.81–5.12)
SODIUM SERPL-SCNC: 141 MMOL/L (ref 135–147)
TRIGL SERPL-MCNC: 124 MG/DL
TSH SERPL DL<=0.05 MIU/L-ACNC: 1.87 UIU/ML (ref 0.45–4.5)
WBC # BLD AUTO: 6.51 THOUSAND/UL (ref 4.31–10.16)

## 2022-10-07 PROCEDURE — 80053 COMPREHEN METABOLIC PANEL: CPT

## 2022-10-07 PROCEDURE — 36415 COLL VENOUS BLD VENIPUNCTURE: CPT

## 2022-10-07 PROCEDURE — 83036 HEMOGLOBIN GLYCOSYLATED A1C: CPT

## 2022-10-07 PROCEDURE — 85025 COMPLETE CBC W/AUTO DIFF WBC: CPT

## 2022-10-07 PROCEDURE — 80061 LIPID PANEL: CPT

## 2022-10-07 PROCEDURE — 84443 ASSAY THYROID STIM HORMONE: CPT

## 2022-10-07 NOTE — TELEPHONE ENCOUNTER
----- Message from Clare Washington MD sent at 10/5/2022  3:37 PM EDT -----  Patient needs to get blood work done  She has labs ordered including CBC, CMP and lipid panel which would include the creatinine and GFR needed for the MRI study  Please contact patient and request her to get blood work done as soon as possible   ----- Message -----  From: Sang Brody  Sent: 19/3/8632   1:41 PM EDT  To: Clare Washington MD    Patient will be having a MRI with contrast  Blood work of creatinine and eGFR needs to be done at least two days  prior of the MRI Study  Please contact patient  Any question please contact me at [phone]  If blood work is done and have a copy please fax it to [phone]  Thank you      [address] [facility]

## 2022-10-10 DIAGNOSIS — E78.2 MIXED HYPERLIPIDEMIA: ICD-10-CM

## 2022-10-10 RX ORDER — ATORVASTATIN CALCIUM 20 MG/1
TABLET, FILM COATED ORAL
Qty: 30 TABLET | Refills: 5 | Status: SHIPPED | OUTPATIENT
Start: 2022-10-10

## 2022-10-16 ENCOUNTER — HOSPITAL ENCOUNTER (OUTPATIENT)
Dept: RADIOLOGY | Facility: HOSPITAL | Age: 62
Discharge: HOME | End: 2022-10-16
Attending: INTERNAL MEDICINE
Payer: COMMERCIAL

## 2022-10-16 DIAGNOSIS — K83.8 DILATED CBD, ACQUIRED: ICD-10-CM

## 2022-10-16 PROCEDURE — A9585 GADOBUTROL INJECTION: HCPCS | Performed by: INTERNAL MEDICINE

## 2022-10-16 PROCEDURE — 74183 MRI ABD W/O CNTR FLWD CNTR: CPT

## 2022-10-16 RX ADMIN — GADOBUTROL 7 ML: 604.72 INJECTION INTRAVENOUS at 14:56

## 2022-10-21 DIAGNOSIS — R10.13 DYSPEPSIA: ICD-10-CM

## 2022-10-21 DIAGNOSIS — R10.84 GENERALIZED ABDOMINAL PAIN: ICD-10-CM

## 2022-10-21 RX ORDER — PANTOPRAZOLE SODIUM 40 MG/1
TABLET, DELAYED RELEASE ORAL
Qty: 30 TABLET | Refills: 2 | Status: SHIPPED | OUTPATIENT
Start: 2022-10-21

## 2022-10-25 ENCOUNTER — OFFICE VISIT (OUTPATIENT)
Dept: FAMILY MEDICINE CLINIC | Facility: CLINIC | Age: 62
End: 2022-10-25
Payer: COMMERCIAL

## 2022-10-25 VITALS
DIASTOLIC BLOOD PRESSURE: 70 MMHG | HEIGHT: 64 IN | WEIGHT: 172 LBS | OXYGEN SATURATION: 98 % | HEART RATE: 85 BPM | BODY MASS INDEX: 29.37 KG/M2 | TEMPERATURE: 99 F | SYSTOLIC BLOOD PRESSURE: 130 MMHG

## 2022-10-25 DIAGNOSIS — H25.89 OTHER AGE-RELATED CATARACT OF BOTH EYES: ICD-10-CM

## 2022-10-25 DIAGNOSIS — Z01.818 PRE-OP EXAMINATION: Primary | ICD-10-CM

## 2022-10-25 PROBLEM — K76.0 HEPATIC STEATOSIS: Status: ACTIVE | Noted: 2022-10-25

## 2022-10-25 PROCEDURE — 99214 OFFICE O/P EST MOD 30 MIN: CPT | Performed by: FAMILY MEDICINE

## 2022-10-25 NOTE — PROGRESS NOTES
Name: Aubree Valladares      : 1960      MRN: [id number]  Encounter Provider: Halle Morales MD  Encounter Date: 10/25/2022   Encounter department: [place] [id number]  Pre-op examination  Assessment & Plan:  Patient is low risk patient ,having low risk surgery   Cataract of ;eft eye on 22,right eye on 22  No on any Anticoagulation   Patient is clear for surgery      2  Other age-related cataract of both eyes  Assessment & Plan:  F/up with Ophthalmology ,paln for Cataract surgery             Subjective      Patient here for preop clearance she is going to have the cataract surgery on the left eye in  and the right eye  she deny any chest pain short of breath no palpitation no dyspnea on exertion no lower extremity edema a she is not smoker not on any anticoagulation no personal family history of hyper coagulation    Review of Systems   Constitutional: Negative for chills and fever  HENT: Negative for ear pain and sore throat  Eyes: Negative for pain and visual disturbance  Respiratory: Negative for cough and shortness of breath  Cardiovascular: Negative for chest pain and palpitations  Gastrointestinal: Negative for abdominal pain and vomiting  Genitourinary: Negative for dysuria and hematuria  Musculoskeletal: Negative for arthralgias and back pain  Skin: Negative for color change and rash  Neurological: Negative for seizures and syncope  All other systems reviewed and are negative        Current Outpatient Medications on File Prior to Visit   Medication Sig   • atorvastatin (LIPITOR) 20 mg tablet TAKE 1 TABLET BY MOUTH EVERY DAY   • Lancets (ONETOUCH ULTRASOFT) lancets One touch- tests bid #100 with 2 refills   • levothyroxine 112 mcg tablet TAKE 1 TABLET (112 MCG TOTAL) BY MOUTH DAILY IN THE EARLY MORNING   • lidocaine (Lidoderm) 5 % Apply 1 patch topically daily Remove & Discard patch within 12 hours or as directed by MD   • LORazepam (ATIVAN) 1 mg tablet Take 1 tablet (1 mg total) by mouth every 8 (eight) hours as needed for anxiety for up to 1 day   • metFORMIN (GLUCOPHAGE) 500 mg tablet TAKE 1 TABLET BY MOUTH EVERY DAY   • methocarbamol (ROBAXIN) 500 mg tablet Take 1 tablet (500 mg total) by mouth 2 (two) times a day   • naproxen (NAPROSYN) 500 mg tablet Take 1 tablet (500 mg total) by mouth every 12 (twelve) hours as needed for moderate pain   • OneTouch Verio test strip TEST 2 (TWO) TIMES A DAY   • pantoprazole (PROTONIX) 40 mg tablet TAKE 1 TABLET BY MOUTH EVERY DAY   • pregabalin (LYRICA) 75 mg capsule Take 75 mg by mouth 2 (two) times a day   • silver sulfadiazine (SILVADENE,SSD) 1 % cream Apply topically daily   • traZODone (DESYREL) 50 mg tablet TAKE 1 TABLET BY MOUTH EVERYDAY AT BEDTIME       Objective     /70   Pulse 85   Temp 99 °F (37 2 °C) (Tympanic)   Ht 5' 3 5" (1 613 m)   Wt 78 kg (172 lb)   SpO2 98%   Breastfeeding No   BMI 29 99 kg/m²     Physical Exam  Vitals and nursing note reviewed  Constitutional:       General: She is not in acute distress  Appearance: She is well-developed  HENT:      Head: Normocephalic and atraumatic  Nose: Nose normal       Mouth/Throat:      Pharynx: Oropharynx is clear  Eyes:      Conjunctiva/sclera: Conjunctivae normal    Neck:      Vascular: No JVD  Cardiovascular:      Rate and Rhythm: Normal rate and regular rhythm  Heart sounds: Normal heart sounds  No murmur heard  No friction rub  No gallop  Pulmonary:      Effort: Pulmonary effort is normal       Breath sounds: Normal breath sounds  Abdominal:      General: Bowel sounds are normal       Palpations: Abdomen is soft  Tenderness: There is no abdominal tenderness  Musculoskeletal:      Right lower leg: No edema  Left lower leg: No edema  Skin:     Findings: No erythema or rash     Neurological:      Mental Status: She is oriented to person, place, and time     Psychiatric:         Mood and Affect: Mood normal        Ivette Locke MD

## 2022-10-27 ENCOUNTER — OFFICE VISIT (OUTPATIENT)
Dept: GASTROENTEROLOGY | Facility: CLINIC | Age: 62
End: 2022-10-27

## 2022-10-27 VITALS
DIASTOLIC BLOOD PRESSURE: 70 MMHG | HEIGHT: 64 IN | BODY MASS INDEX: 29.12 KG/M2 | TEMPERATURE: 97.8 F | SYSTOLIC BLOOD PRESSURE: 138 MMHG | WEIGHT: 170.6 LBS

## 2022-10-27 DIAGNOSIS — K83.8 DILATED CBD, ACQUIRED: Primary | ICD-10-CM

## 2022-10-27 DIAGNOSIS — K86.2 PANCREAS CYST: ICD-10-CM

## 2022-10-27 PROBLEM — H26.9 CATARACTA: Status: ACTIVE | Noted: 2022-10-27

## 2022-10-27 NOTE — ASSESSMENT & PLAN NOTE
Patient is low risk patient ,having low risk surgery   Cataract of ;eft eye on 11/16/22,right eye on 11/28/22  No on any Anticoagulation   Patient is clear for surgery

## 2022-10-27 NOTE — PROGRESS NOTES
[facility] - Outpatient Follow-up Note  Annabel Real 60 y o  female MRN: [id number]  Encounter: [id number]          ASSESSMENT AND PLAN:      1  Pancreatic lesion  2  Dilated CBD  3  Dilated pancreatic duct  4  Abdominal pain  5  Bloating  6  GERD    Patient's upper GI symptoms have improved with pantoprazole but not completely controlled  We will do EGD to investigate for ulcer, H pylori infection, celiac disease  We discussed about getting EUS to investigate MRI findings about the pancreatic lesion, dilated CBD and pancreatic duct  Patient agreeable to get both procedures done  Procedures ordered  Further management plan based on investigation results  RTC 4 months  Thai Alvarez MD  Gastroenterology  [facility]  Date: October 27, 2022    Orders Placed This Encounter   Procedures   • EGD   • Endoscopic ultrasonography, GI (Upper)            ______________________________________________________________________    SUBJECTIVE:  71-year-old with diabetes, hypothyroidism, status post cholecystectomy presents for follow-up for abdominal pain, dyspepsia, GERD and pancreatic finding seen on MRI  At last visit she reported the GI symptoms  We ordered CT scan which showed dilated common bile duct  MRI showed dilated common bile duct, pancreatic duct as well as new cyst in pancreas  H pylori antibody and hepatitis-C screening test were all negative  Currently, patient reports that abdominal pain is intermittent but not as severe  She has been really gassy and bloated  Her heartburn is also intermittent currently  It has improved since pantoprazole was started it she is taking pantoprazole on a daily basis  She takes about 30-60 minutes prior to meals usually  REVIEW OF SYSTEMS IS OTHERWISE NEGATIVE        Historical Information   Past Medical History:   Diagnosis Date   • Anxiety 3/29/2019   • Asthma    • Cataract     viral   • Chronic kidney disease    • Hyperlipidemia    • Hypertension    • Hypothyroid    • IFG (impaired fasting glucose) 2019   • Incisional hernia    • Insomnia    • Kidney stone     in past   • Left upper quadrant pain 2019   • Lower abdominal pain 2020   • Osteoarthritis    • RA (rheumatoid arthritis) ([facility] [address] ) 2015    pt unsure    • Type 2 diabetes mellitus without complication, without long-term current use of insulin ([facility] [address] ) 2019    NIDDM   • Uses Vietnamese as primary spoken language    • Varicose veins of both lower extremities    • Wears dentures      upper partials   • Wears glasses      Past Surgical History:   Procedure Laterality Date   • APPENDECTOMY     • BREAST BIOPSY Right     negative   •  SECTION      x 3   • CHOLECYSTECTOMY     • COLONOSCOPY     • HERNIA REPAIR     • HYSTERECTOMY     • [place] LAP, INCISIONAL HERNIA REPAIR,REDUCIBLE N/A 2019    Procedure: REPAIR HERNIA INCISIONAL LAPAROSCOPIC W/ ROBOTICS;  Surgeon: Dejuan Rosenthal MD;  Location: [facility] OR;  Service: General     Social History   Social History     Substance and Sexual Activity   Alcohol Use Yes    Comment: liquor     Social History     Substance and Sexual Activity   Drug Use No     Social History     Tobacco Use   Smoking Status Never Smoker   Smokeless Tobacco Never Used   Tobacco Comment    no passive smoke exposure     Family History   Problem Relation Age of Onset   • Emphysema Mother    • No Known Problems Sister    • No Known Problems Daughter    • No Known Problems Sister    • No Known Problems Sister    • No Known Problems Sister    • No Known Problems Paternal Aunt    • No Known Problems Maternal Aunt    • No Known Problems Maternal Aunt        Meds/Allergies       Current Outpatient Medications:   •  atorvastatin (LIPITOR) 20 mg tablet  •  Lancets (ONETOUCH ULTRASOFT) lancets  •  levothyroxine 112 mcg tablet  •  lidocaine (Lidoderm) 5 %  •  metFORMIN (GLUCOPHAGE) 500 mg tablet  •  methocarbamol (ROBAXIN) 500 mg tablet  •  naproxen (NAPROSYN) 500 mg tablet  •  OneTouch Verio test strip  •  pantoprazole (PROTONIX) 40 mg tablet  •  pregabalin (LYRICA) 75 mg capsule  •  silver sulfadiazine (SILVADENE,SSD) 1 % cream  •  traZODone (DESYREL) 50 mg tablet  •  LORazepam (ATIVAN) 1 mg tablet    No Known Allergies        Objective     Blood pressure 138/70, temperature 97 8 °F (36 6 °C), temperature source Tympanic, height 5' 3 5" (1 613 m), weight 77 4 kg (170 lb 9 6 oz), not currently breastfeeding  Body mass index is 29 75 kg/m²  PHYSICAL EXAM:      General Appearance:   Alert, cooperative, no distress   HEENT:   Normocephalic, atraumatic, anicteric      Neck:  Supple, symmetrical, trachea midline   Lungs:   Clear to auscultation bilaterally; no rales, rhonchi or wheezing; respirations unlabored    Heart[de-identified]   Regular rate and rhythm; no murmur, rub, or gallop  Abdomen:   Soft, non-tender, non-distended; normal bowel sounds; no masses, no organomegaly    Genitalia:   Deferred    Rectal:   Deferred    Extremities:  No cyanosis, clubbing or edema    Pulses:  2+ and symmetric    Skin:  No jaundice, rashes, or lesions    Lymph nodes:  No palpable cervical lymphadenopathy        Lab Results:   No visits with results within 1 Day(s) from this visit     Latest known visit with results is:   Appointment on 10/07/2022   Component Date Value   • Sodium 10/07/2022 141    • Potassium 10/07/2022 4 4    • Chloride 10/07/2022 104    • CO2 10/07/2022 32    • ANION GAP 10/07/2022 5    • BUN 10/07/2022 13    • Creatinine 10/07/2022 0 69    • Glucose, Fasting 10/07/2022 136 (A)   • Calcium 10/07/2022 9 5    • AST 10/07/2022 31    • ALT 10/07/2022 29    • Alkaline Phosphatase 10/07/2022 98    • Total Protein 10/07/2022 7 7    • Albumin 10/07/2022 3 7    • Total Bilirubin 10/07/2022 0 75    • eGFR 10/07/2022 92    • WBC 10/07/2022 6 51    • RBC 10/07/2022 4 82    • Hemoglobin 10/07/2022 14 3    • Hematocrit 10/07/2022 41 9    • MCV 10/07/2022 87    • MCH 10/07/2022 29 7    • MCHC 10/07/2022 34 1    • RDW 10/07/2022 12 0    • MPV 10/07/2022 10 7    • Platelets 49/70/8709 218    • nRBC 10/07/2022 0    • Neutrophils Relative 10/07/2022 56    • Immat GRANS % 10/07/2022 0    • Lymphocytes Relative 10/07/2022 35    • Monocytes Relative 10/07/2022 6    • Eosinophils Relative 10/07/2022 3    • Basophils Relative 10/07/2022 0    • Neutrophils Absolute 10/07/2022 3 58    • Immature Grans Absolute 10/07/2022 0 02    • Lymphocytes Absolute 10/07/2022 2 30    • Monocytes Absolute 10/07/2022 0 40    • Eosinophils Absolute 10/07/2022 0 20    • Basophils Absolute 10/07/2022 0 01    • Cholesterol 10/07/2022 152    • Triglycerides 10/07/2022 124    • HDL, Direct 10/07/2022 53    • LDL Calculated 10/07/2022 74    • Non-HDL-Chol (CHOL-HDL) 10/07/2022 99    • TSH 3RD GENERATON 10/07/2022 1 872          Radiology Results:   MRI abdomen w wo contrast and mrcp    Result Date: 10/20/2022  Narrative: MRI OF THE ABDOMEN WITH AND WITHOUT CONTRAST WITH MRCP INDICATION: 60 years / Female  K83 8: Other specified diseases of biliary tract  Dilated CBD on CT  Normal bilirubin level  COMPARISON: Several CTs of the abdomen and pelvis, the most recent from 8/23/2022 and MRI of the abdomen from 6/8/2017  TECHNIQUE:  The following pulse sequences were obtained:  axial T1 weighted in/out of phase images, multiplanar T2 weighted images, axial DWI/ADC, pre-contrast axial T1 with fat saturation and dynamic multiphase post-contrast fat suppressed T1 weighted images  3D MRCP images were obtained with radial thick slabs and projections  3D rendering was performed from the acquisition scanner  IV Contrast:  7 mL of Gadobutrol injection (SINGLE-DOSE) FINDINGS: LOWER CHEST:   Unremarkable  LIVER: Normal in size and configuration  Mild diffuse hepatic steatosis  No suspicious mass  The hepatic veins and portal veins are patent   BILE DUCTS:  Mild central intrahepatic biliary ductal dilatation  There is low insertion of the cystic duct  The common bile duct measures 1 cm in diameter, similar to prior MRI from June 2017  The common hepatic duct measures 1 1 cm in diameter, slightly dilated from 9 mm in June 2017  No choledocholithiasis, biliary stricture or suspicious mass  GALLBLADDER:  The patient is status post cholecystectomy  PANCREAS:  The main pancreatic duct is at the upper limits of normal, measuring 4 mm in diameter in the head of the pancreas and gradually tapering to smaller caliber more distally  This is similar to the MRI from June 2017  There is a 6 mm cystic lesion in the body of the pancreas superiorly, which appears as an outpouching from the main pancreatic duct (series 10 image 66, series 8 image 18 and series 1000 image 1)  In retrospect it was also probably present on the MRI from 2017 (series 401 image 63 and series 301 image 19 on that exam) however is more conspicuous on the current study which is less degraded by motion artifact  It does not appear to be significantly enlarged from that exam   No definite nodules or internal enhancing components are seen  The pancreas is mildly diffusely atrophic, however otherwise within normal limits  ADRENAL GLANDS:  Unremarkable  SPLEEN:  Unremarkable  KIDNEYS/PROXIMAL URETERS:  No hydroureteronephrosis  No suspicious renal mass  BOWEL:   No dilated loops of bowel  PERITONEUM/RETROPERITONEUM:  No ascites  LYMPH NODES:  No abdominal lymphadenopathy  VASCULAR STRUCTURES:  No aneurysm  ABDOMINAL WALL:  Unremarkable  OSSEOUS STRUCTURES:  No suspicious osseous lesion  Impression: Low insertion of the cystic duct  CBD measuring 1 cm in diameter, similar to prior MRI from June 2017  Common hepatic duct measuring 1 1 cm in diameter, slightly dilated from 9 mm in June 2017  No choledocholithiasis or stricture  Mild central intrahepatic biliary duct dilatation   6 mm unilocular cystic lesion in body of the pancreas, connecting to the main pancreatic duct, with no definite internal nodules or enhancing components, most likely representing a sidebranch IPMN  In retrospect it was likely also present on the 2017 MRI and does not appear to be significantly enlarged  Based on the size of the lesion and the fact that it appears without significant change over 5 years, continued follow-up is recommended in 2 years, preferably with MRI/MRCP  Mildly atrophic pancreas, with main pancreatic duct at the upper limits of normal in the head of the pancreas and tapering gradually to smaller caliber more distally, similar to prior MRI from June 2017  Mild diffuse hepatic steatosis  The above recommendations regarding pancreatic findings assumes that patient does not have family history of pancreatic cancer nor have any symptoms potentially attributable to pancreatic cystic lesions (hyperamylasemia, recent-onset diabetes, severe epigastric pain, weight loss, steatorrhea, or jaundice ) If these conditions are not true, then management should be deferred to judgement of specialists such as gastroenterologists or oncologic surgeons  Recommendations are based on recent consensus statements on management of pancreatic cystic lesions from [place] [employer], [address] of Radiology, the journal Pancreatology, and our own institutional consensus   Workstation performed: [id number]

## 2022-10-27 NOTE — PATIENT INSTRUCTIONS
Enfermedad por reflujo gastroesofágico (ERGE)   LO QUE NECESITA SABER:   La enfermedad por reflujo gastroesofágico (Willma Saas) es el reflujo que ocurre más de 2 veces a la semana alayna varias semanas  Reflujo significa que el ácido y los alimentos en el estómago regresan al esófago  La ERGE puede causar otros problemas de tez con el tiempo si no es tratada  Retia Binder [facility]:   Llame al [place]  de emergencias local (911 en los [place]) si:  Usted siente dolor jerri en el pecho y dificultad repentina para respirar  Regrese a la loren de emergencias si:  Tiene dificultad para respirar después de vomitar  Tiene dificultad para tragar o dolor al tragar  Yvonne evacuaciones intestinales son de color francisco, con Venetie, o de apariencia alquitranada  Desai vómito parece adele café molido o contiene janae  Llame a desai médico o gastroenterólogo si:  Phyllis Thom y no puede eructar o vomitar  Vomita grandes cantidades, o vomita con frecuencia  Usted pierde peso sin proponérselo  Yvonne síntomas empeoran o no mejoran con el tratamiento  Usted tiene preguntas o inquietudes acerca de desai condición o cuidado  Medicamentos:  Los medicamentos para disminuir el ácido [place] medicamentos también podrían usarse para ayudar a que desai esfínter esofágico inferior y desai estómago se contraigan (estrechen) más  [place] yvonne medicamentos adele se le haya indicado  Consulte con desai médico si usted margareth que desai medicamento no le está ayudando o si presenta efectos secundarios  Infórmele si es alérgico a algún medicamento  Mantenga mega lista actualizada de los [place], las vitaminas y los productos herbales que jace  Incluya los siguientes datos de los medicamentos: cantidad, frecuencia y motivo de administración  Traiga con usted la lista o los envases de las píldoras a yvonne citas de seguimiento   Lleve la lista de los medicamentos con usted en allie de [place] emergencia  Control de la ERGE:      No consuma alimentos o bebidas que puedan aumentar la [place]  Estos incluyen chocolate, menta, comidas fritas o grasosas, bebidas que contienen cafeína o bebidas gaseosas  Otros alimentos incluyen comidas picantes, cebollas, tomates y alimentos a base de tomate  No consuma alimentos y bebidas que puedan irritar desai esófago, adele las frutas cítricas, los jugos y las bebidas alcohólicas  No ingiera comidas abundantes  Cuando usted come [place] a la vez, desai estómago necesita más ácido para digerirla  Consuma 6 comidas pequeñas al día en vez de 3 comidas grandes y coma lentamente  No consuma alimentos entre 2 y 3 horas antes de WEDGECARRUP  Eleve la cabecera de desai cama  Coloque bloques de 6 pulgadas debajo de la cabecera de la estructura de desai cama  También podría usar más mega almohada para apoyar desai anju y hombros mientras duerme  Mantenga un peso saludable  Si usted tiene sobrepeso, la pérdida de peso podría ayudar a aliviar los síntomas de la [place]  No fume  Fumar debilita el esfínter esofágico inferior y [place] el riesgo de [place]  Pida información a desai médico si usted actualmente fuma y necesita ayuda para dejar de fumar  Los cigarrillos electrónicos o el tabaco sin humo igualmente contienen nicotina  Consulte con desai médico antes de [employer]  No aplique presión sobre el abdomen  La presión empuja el ácido hacia el esófago  No use ropa que Dorian alrededor de GFRANQ  No se agache  Doble las rodillas para recoger algo  Acuda a yvonne consultas de control con desai médico o gastroenterólogo según le indicaron: Anote yvonne preguntas para que se acuerde de hacerlas alayna yvonne visitas  © Copyright [facility] 2022 Information is for End User's use only and may not be sold, redistributed or otherwise used for commercial purposes   All illustrations and images included in CareNotes® are the copyrighted property of [employer]  or [employer] [facility]  Esta información es sólo para uso en educación  Desai intención no es darle un consejo médico sobre enfermedades o tratamientos  Colsulte con desai Sudhakar Masters farmacéutico antes de seguir cualquier régimen médico para saber si es seguro y efectivo para usted    Procedure: EUS and EGD  Scheduled date of procedure (as of today): 12/06/2022  Physician performing procedure: Dr Nara Hall   Location of procedure: [facility]  Instructions reviewed with patient by:   Clearances:  N/A

## 2022-11-04 DIAGNOSIS — E11.9 TYPE 2 DIABETES MELLITUS WITHOUT COMPLICATION, WITHOUT LONG-TERM CURRENT USE OF INSULIN (HCC): ICD-10-CM

## 2022-11-04 RX ORDER — BLOOD SUGAR DIAGNOSTIC
STRIP MISCELLANEOUS
Qty: 50 STRIP | Refills: 5 | Status: SHIPPED | OUTPATIENT
Start: 2022-11-04

## 2022-12-05 DIAGNOSIS — G47.09 OTHER INSOMNIA: ICD-10-CM

## 2022-12-05 RX ORDER — TRAZODONE HYDROCHLORIDE 50 MG/1
TABLET ORAL
Qty: 30 TABLET | Refills: 2 | Status: SHIPPED | OUTPATIENT
Start: 2022-12-05 | End: 2022-12-06 | Stop reason: ALTCHOICE

## 2022-12-06 ENCOUNTER — HOSPITAL ENCOUNTER (OUTPATIENT)
Dept: GASTROENTEROLOGY | Facility: HOSPITAL | Age: 62
Setting detail: OUTPATIENT SURGERY
Discharge: HOME | End: 2022-12-06
Attending: INTERNAL MEDICINE

## 2022-12-06 ENCOUNTER — ANESTHESIA EVENT (OUTPATIENT)
Dept: GASTROENTEROLOGY | Facility: HOSPITAL | Age: 62
End: 2022-12-06

## 2022-12-06 ENCOUNTER — ANESTHESIA (OUTPATIENT)
Dept: GASTROENTEROLOGY | Facility: HOSPITAL | Age: 62
End: 2022-12-06

## 2022-12-06 VITALS
DIASTOLIC BLOOD PRESSURE: 67 MMHG | TEMPERATURE: 97.6 F | RESPIRATION RATE: 18 BRPM | HEIGHT: 64 IN | HEART RATE: 63 BPM | SYSTOLIC BLOOD PRESSURE: 121 MMHG | WEIGHT: 175 LBS | BODY MASS INDEX: 29.88 KG/M2 | OXYGEN SATURATION: 96 %

## 2022-12-06 DIAGNOSIS — K86.2 PANCREAS CYST: ICD-10-CM

## 2022-12-06 DIAGNOSIS — K83.8 DILATED CBD, ACQUIRED: ICD-10-CM

## 2022-12-06 LAB — GLUCOSE SERPL-MCNC: 126 MG/DL (ref 65–140)

## 2022-12-06 RX ORDER — SODIUM CHLORIDE 9 MG/ML
INJECTION, SOLUTION INTRAVENOUS CONTINUOUS PRN
Status: DISCONTINUED | OUTPATIENT
Start: 2022-12-06 | End: 2022-12-06

## 2022-12-06 RX ORDER — PROPOFOL 10 MG/ML
INJECTION, EMULSION INTRAVENOUS AS NEEDED
Status: DISCONTINUED | OUTPATIENT
Start: 2022-12-06 | End: 2022-12-06

## 2022-12-06 RX ORDER — FENTANYL CITRATE 50 UG/ML
INJECTION, SOLUTION INTRAMUSCULAR; INTRAVENOUS AS NEEDED
Status: DISCONTINUED | OUTPATIENT
Start: 2022-12-06 | End: 2022-12-06

## 2022-12-06 RX ORDER — PROPOFOL 10 MG/ML
INJECTION, EMULSION INTRAVENOUS CONTINUOUS PRN
Status: DISCONTINUED | OUTPATIENT
Start: 2022-12-06 | End: 2022-12-06

## 2022-12-06 RX ADMIN — FENTANYL CITRATE 50 MCG: 50 INJECTION INTRAMUSCULAR; INTRAVENOUS at 09:27

## 2022-12-06 RX ADMIN — FENTANYL CITRATE 25 MCG: 50 INJECTION INTRAMUSCULAR; INTRAVENOUS at 09:45

## 2022-12-06 RX ADMIN — FENTANYL CITRATE 25 MCG: 50 INJECTION INTRAMUSCULAR; INTRAVENOUS at 09:49

## 2022-12-06 RX ADMIN — PROPOFOL 100 MCG/KG/MIN: 10 INJECTION, EMULSION INTRAVENOUS at 09:27

## 2022-12-06 RX ADMIN — PROPOFOL 100 MG: 10 INJECTION, EMULSION INTRAVENOUS at 09:27

## 2022-12-06 RX ADMIN — SODIUM CHLORIDE: 0.9 INJECTION, SOLUTION INTRAVENOUS at 09:24

## 2022-12-06 RX ADMIN — PROPOFOL 60 MG: 10 INJECTION, EMULSION INTRAVENOUS at 09:28

## 2022-12-06 NOTE — ANESTHESIA PREPROCEDURE EVALUATION
Procedure:  EGD  ENDOSCOPIC ULTRASOUND (UPPER)    Relevant Problems   CARDIO   (+) Breast pain, left   (+) Essential hypertension   (+) Mixed hyperlipidemia      ENDO   (+) Hypothyroidism   (+) Type 2 diabetes mellitus without complication, without long-term current use of insulin (HCC)      GI/HEPATIC   (+) Hepatic steatosis      /RENAL   (+) Kidney stone      MUSCULOSKELETAL   (+) Cervical myofascial pain syndrome   (+) Degeneration of lumbar intervertebral disc   (+) Osteoarthritis   (+) Primary osteoarthritis of left knee      NEURO/PSYCH   (+) Anxiety   (+) Cervical myofascial pain syndrome      PULMONARY   (+) Mild intermittent asthma without complication        Physical Exam    Airway    Mallampati score: I  TM Distance: >3 FB  Neck ROM: full     Dental   No notable dental hx     Cardiovascular  Cardiovascular exam normal    Pulmonary  Pulmonary exam normal     Other Findings        Anesthesia Plan  ASA Score- 2     Anesthesia Type- IV sedation with anesthesia with ASA Monitors  Additional Monitors:   Airway Plan:           Plan Factors-Exercise tolerance (METS): >4 METS  Chart reviewed  Patient summary reviewed  Patient is not a current smoker  Induction- intravenous  Postoperative Plan-     Informed Consent- Anesthetic plan and risks discussed with patient

## 2022-12-06 NOTE — ANESTHESIA POSTPROCEDURE EVALUATION
Post-Op Assessment Note    CV Status:  Stable    Pain management: adequate     Mental Status:  Alert and awake   Hydration Status:  Euvolemic   PONV Controlled:  Controlled   Airway Patency:  Patent      Post Op Vitals Reviewed: Yes            No notable events documented      /58 (12/06/22 1004)    Temp 97 6 °F (36 4 °C) (12/06/22 1004)    Pulse 72 (12/06/22 1004)   Resp 18 (12/06/22 1004)    SpO2 97 % (12/06/22 1004)

## 2022-12-06 NOTE — H&P
History and Physical - [facility]  [facility] 60 y o  female MRN: [id number]                  HPI: [facility] is a 60y o  year old female who presents for EUS for the assessment of dilated BD,PD and pancreatic cyst      REVIEW OF SYSTEMS: Per the HPI, and otherwise unremarkable      Historical Information   Past Medical History:   Diagnosis Date   • Anxiety 3/29/2019   • Asthma    • Cataract     viral   • Chronic kidney disease    • Hyperlipidemia    • Hypertension    • Hypothyroid    • IFG (impaired fasting glucose) 2019   • Incisional hernia    • Insomnia    • Kidney stone     in past   • Left upper quadrant pain 2019   • Lower abdominal pain 2020   • Osteoarthritis    • [facility]    pt unsure    • Type 2 diabetes mellitus without complication, without long-term current use of insulin ([facility] ) 2019    NIDDM   • Uses Norwegian as primary spoken language    • Varicose veins of both lower extremities    • Wears dentures      upper partials   • Wears glasses      Past Surgical History:   Procedure Laterality Date   • APPENDECTOMY     • BREAST BIOPSY Right     negative   •  SECTION      x 3   • CHOLECYSTECTOMY     • COLONOSCOPY     • HERNIA REPAIR     • HYSTERECTOMY     • [place] LAP, INCISIONAL HERNIA REPAIR,REDUCIBLE N/A 2019    Procedure: REPAIR HERNIA INCISIONAL LAPAROSCOPIC W/ ROBOTICS;  Surgeon: Heydi Asif MD;  Location: [facility] OR;  Service: General     Social History   Social History     Substance and Sexual Activity   Alcohol Use Yes    Comment: liquor     Social History     Substance and Sexual Activity   Drug Use No     Social History     Tobacco Use   Smoking Status Never   Smokeless Tobacco Never   Tobacco Comments    no passive smoke exposure     Family History   Problem Relation Age of Onset   • Emphysema Mother    • No Known Problems Sister    • No Known Problems Daughter    • No Known Problems Sister    • No Known Problems Sister    • No Known Problems Sister    • No Known Problems Paternal Aunt    • No Known Problems Maternal Aunt    • No Known Problems Maternal Aunt        Meds/Allergies     (Not in a hospital admission)      No Known Allergies    Objective     Blood pressure 166/81, pulse 77, temperature 97 8 °F (36 6 °C), temperature source Tympanic, resp  rate 18, height 5' 3 5" (1 613 m), weight 79 4 kg (175 lb), SpO2 97 %, not currently breastfeeding        PHYSICAL EXAM    Gen: NAD  CV: RRR  CHEST: Clear  ABD: soft, NT/ND  EXT: no edema      ASSESSMENT/PLAN:  This is a 60y o  year old female here for EUS

## 2022-12-19 ENCOUNTER — OFFICE VISIT (OUTPATIENT)
Dept: FAMILY MEDICINE CLINIC | Facility: CLINIC | Age: 62
End: 2022-12-19

## 2022-12-19 VITALS
TEMPERATURE: 98.6 F | HEART RATE: 73 BPM | RESPIRATION RATE: 16 BRPM | HEIGHT: 64 IN | OXYGEN SATURATION: 94 % | DIASTOLIC BLOOD PRESSURE: 78 MMHG | SYSTOLIC BLOOD PRESSURE: 122 MMHG | WEIGHT: 173 LBS | BODY MASS INDEX: 29.53 KG/M2

## 2022-12-19 DIAGNOSIS — E03.9 ACQUIRED HYPOTHYROIDISM: ICD-10-CM

## 2022-12-19 DIAGNOSIS — E11.9 TYPE 2 DIABETES MELLITUS WITHOUT COMPLICATION, WITHOUT LONG-TERM CURRENT USE OF INSULIN (HCC): ICD-10-CM

## 2022-12-19 DIAGNOSIS — Z28.21 IMMUNIZATION REFUSED: ICD-10-CM

## 2022-12-19 DIAGNOSIS — M25.552 HIP PAIN, CHRONIC, LEFT: ICD-10-CM

## 2022-12-19 DIAGNOSIS — F41.8 SITUATIONAL ANXIETY: ICD-10-CM

## 2022-12-19 DIAGNOSIS — Z00.01 ENCOUNTER FOR WELL ADULT EXAM WITH ABNORMAL FINDINGS: Primary | ICD-10-CM

## 2022-12-19 DIAGNOSIS — E66.09 CLASS 1 OBESITY DUE TO EXCESS CALORIES WITH SERIOUS COMORBIDITY AND BODY MASS INDEX (BMI) OF 30.0 TO 30.9 IN ADULT: ICD-10-CM

## 2022-12-19 DIAGNOSIS — G89.29 HIP PAIN, CHRONIC, LEFT: ICD-10-CM

## 2022-12-19 PROBLEM — E66.811 CLASS 1 OBESITY DUE TO EXCESS CALORIES WITH SERIOUS COMORBIDITY AND BODY MASS INDEX (BMI) OF 30.0 TO 30.9 IN ADULT: Status: ACTIVE | Noted: 2018-12-10

## 2022-12-19 RX ORDER — LEVOTHYROXINE SODIUM 112 UG/1
112 TABLET ORAL
Qty: 30 TABLET | Refills: 2 | Status: SHIPPED | OUTPATIENT
Start: 2022-12-19

## 2022-12-19 RX ORDER — POLYMYXIN B SULFATE AND TRIMETHOPRIM 1; 10000 MG/ML; [USP'U]/ML
SOLUTION OPHTHALMIC
COMMUNITY
Start: 2022-12-01

## 2022-12-19 RX ORDER — PERPHENAZINE 16 MG/1
TABLET, FILM COATED ORAL
Qty: 100 STRIP | Refills: 3 | Status: SHIPPED | OUTPATIENT
Start: 2022-12-19

## 2022-12-19 RX ORDER — PREDNISOLONE ACETATE 10 MG/ML
SUSPENSION/ DROPS OPHTHALMIC
COMMUNITY
Start: 2022-12-01

## 2022-12-19 RX ORDER — ALPRAZOLAM 0.25 MG/1
0.25 TABLET ORAL
Qty: 10 TABLET | Refills: 0 | Status: SHIPPED | OUTPATIENT
Start: 2022-12-19

## 2022-12-19 RX ORDER — LANCETS 33 GAUGE
EACH MISCELLANEOUS
Qty: 100 EACH | Refills: 3 | Status: SHIPPED | OUTPATIENT
Start: 2022-12-19

## 2022-12-19 RX ORDER — KETOROLAC TROMETHAMINE 5 MG/ML
SOLUTION OPHTHALMIC
COMMUNITY
Start: 2022-11-28

## 2022-12-19 NOTE — PROGRESS NOTES
ADULT ANNUAL [address] [facility] PRIMARY CARE [facility]    NAME: Luis Miguel Coto  AGE: 60 y o  SEX: female  : 1960     DATE: 2022     Assessment and Plan:     Problem List Items Addressed This Visit        Endocrine    Hypothyroidism    Relevant Medications    levothyroxine 112 mcg tablet    Other Relevant Orders    CBC and differential    Comprehensive metabolic panel    Lipid Panel with Direct LDL reflex    TSH, 3rd generation with Free T4 reflex    Microalbumin / creatinine urine ratio    Type 2 diabetes mellitus without complication, without long-term current use of insulin (HCC)    Relevant Medications    glucose blood (Contour Next Test) test strip    Lancets Micro Thin 33G MISC    metFORMIN (GLUCOPHAGE) 500 mg tablet    Other Relevant Orders    CBC and differential    Comprehensive metabolic panel    Lipid Panel with Direct LDL reflex    TSH, 3rd generation with Free T4 reflex    HEMOGLOBIN A1C W/ EAG ESTIMATION    Microalbumin / creatinine urine ratio       Other    Immunization refused     Patient declines flu shot for today         Class 1 obesity due to excess calories with serious comorbidity and body mass index (BMI) of 30 0 to 30 9 in adult     BMI today 30 16 discussed with the patient portion control low-carb low-fat diet and increase physical activity         Encounter for well adult exam with abnormal findings - Primary     Advice and education were given regarding nutrition, aerobic exercises, weight-bearing exercises, cardiovascular risk reduction, fall risk reduction, and age-appropriate supplements  The patient was counseled regarding instructions for management, risk factor reductions, prognosis, risks and benefits of treatment options, patient and family education, and importance of compliance with treatment            Relevant Orders    CBC and differential    Comprehensive metabolic panel    Lipid Panel with Direct LDL reflex    TSH, 3rd generation with Free T4 reflex    Situational anxiety     Patient traveling By airplane to [facility] and she gets anxiety from airplane equesting medication to help her recommend alprazolam 0 25 mg proper use and possible side effects constipation         Relevant Medications    ALPRAZolam (XANAX) 0 25 mg tablet    Other Relevant Orders    CBC and differential    Comprehensive metabolic panel    Lipid Panel with Direct LDL reflex    TSH, 3rd generation with Free T4 reflex    Hip pain, chronic, left     New diagnosis symptomatic no recent fall or trauma no swelling pain localized in the left hip no radiation recommend Voltaren gel to apply 4 g 4 times a day proper use and possible side effect discussed with patient proper posture and importance lose weight review if no improvement to call the office         Relevant Medications    Diclofenac Sodium (VOLTAREN) 1 %    Other Relevant Orders    CBC and differential    Comprehensive metabolic panel    Lipid Panel with Direct LDL reflex    TSH, 3rd generation with Free T4 reflex       Immunizations and preventive care screenings were discussed with patient today  Appropriate education was printed on patient's after visit summary  Counseling:  Alcohol/drug use: discussed moderation in alcohol intake, the recommendations for healthy alcohol use, and avoidance of illicit drug use  Dental Health: discussed importance of regular tooth brushing, flossing, and dental visits  Injury prevention: discussed safety/seat belts, safety helmets, smoke detectors, carbon dioxide detectors, and smoking near bedding or upholstery  Sexual health: discussed sexually transmitted diseases, partner selection, use of condoms, avoidance of unintended pregnancy, and contraceptive alternatives  BMI Counseling: Body mass index is 30 16 kg/m²   The BMI is above normal  Nutrition recommendations include decreasing portion sizes, decreasing fast food intake and limiting drinks that contain sugar  Exercise recommendations include exercising 3-5 times per week  No pharmacotherapy was ordered  Rationale for BMI follow-up plan is due to patient being overweight or obese  Depression Screening and Follow-up Plan: Patient was screened for depression during today's encounter  They screened negative with a PHQ-2 score of 0  Return in about 1 year (around 12/19/2023)  Chief Complaint:     Chief Complaint   Patient presents with   • Physical Exam     Patient is here today for her yearly physical exam       History of Present Illness:     Adult Annual Physical   Patient here for a comprehensive physical exam  The patient reports problems - Patient here for well exam and concern about pain in her left hip she had it for more than 8-week no fall no trauma pain worse when standing her feet and go up and down the steps no swelling no numbness no muscle weakness no rash  Diet and Physical Activity  Diet/Nutrition: low carb diet  Exercise: no formal exercise  Depression Screening  PHQ-2/9 Depression Screening    Little interest or pleasure in doing things: 0 - not at all  Feeling down, depressed, or hopeless: 0 - not at all  PHQ-2 Score: 0  PHQ-2 Interpretation: Negative depression screen       General Health  Sleep: sleeps well  Hearing: normal - bilateral   Vision: no vision problems  Dental: brushes teeth twice daily  /GYN Health  Patient is: postmenopausal     Review of Systems:     Review of Systems   Constitutional: Negative for chills and fever  HENT: Negative for ear pain and sore throat  Eyes: Negative for pain and visual disturbance  Respiratory: Negative for cough and shortness of breath  Cardiovascular: Negative for chest pain and palpitations  Gastrointestinal: Negative for abdominal pain, constipation, diarrhea and vomiting  Genitourinary: Negative for dysuria and hematuria  Musculoskeletal: Positive for arthralgias     Skin: Negative for color change and rash  Neurological: Negative for seizures and syncope  All other systems reviewed and are negative       Past Medical History:     Past Medical History:   Diagnosis Date   • Anxiety 3/29/2019   • Asthma    • Cataract     viral   • Chronic kidney disease    • Hyperlipidemia    • Hypertension    • Hypothyroid    • IFG (impaired fasting glucose) 2019   • Incisional hernia    • Insomnia    • Kidney stone     in past   • Left upper quadrant pain 2019   • Lower abdominal pain 2020   • Osteoarthritis    • [facility] 2015    pt unsure    • Type 2 diabetes mellitus without complication, without long-term current use of insulin ([facility] ) 2019    NIDDM   • Uses [place]ian as primary spoken language    • Varicose veins of both lower extremities    • Wears dentures      upper partials   • Wears glasses       Past Surgical History:     Past Surgical History:   Procedure Laterality Date   • APPENDECTOMY     • BREAST BIOPSY Right     negative   •  SECTION      x 3   • CHOLECYSTECTOMY     • COLONOSCOPY     • HERNIA REPAIR     • HYSTERECTOMY     • [place] LAP, INCISIONAL HERNIA REPAIR,REDUCIBLE N/A 2019    Procedure: REPAIR HERNIA INCISIONAL LAPAROSCOPIC W/ ROBOTICS;  Surgeon: Trace Parks MD;  Location: [facility];  Service: General      Social History:     Social History     Socioeconomic History   • Marital status: /Civil Union     Spouse name: None   • Number of children: None   • Years of education: None   • Highest education level: None   Occupational History   • None   Tobacco Use   • Smoking status: Never   • Smokeless tobacco: Never   • Tobacco comments:     no passive smoke exposure   Vaping Use   • Vaping Use: Never used   Substance and Sexual Activity   • Alcohol use: Yes     Comment: liquor   • Drug use: No   • Sexual activity: Not Currently     Partners: Male   Other Topics Concern   • None   Social History Narrative   • None     Social Determinants of Health     Financial Resource Strain: Not on file   Food Insecurity: Not on file   Transportation Needs: Not on file   Physical Activity: Not on file   Stress: Not on file   Social Connections: Not on file   Intimate Partner Violence: Not on file   Housing Stability: Not on file      Family History:     Family History   Problem Relation Age of Onset   • Emphysema Mother    • No Known Problems Sister    • No Known Problems Daughter    • No Known Problems Sister    • No Known Problems Sister    • No Known Problems Sister    • No Known Problems Paternal Aunt    • No Known Problems Maternal Aunt    • No Known Problems Maternal Aunt       Current Medications:     Current Outpatient Medications   Medication Sig Dispense Refill   • ALPRAZolam (XANAX) 0 25 mg tablet Take 1 tablet (0 25 mg total) by mouth daily at bedtime as needed for anxiety 10 tablet 0   • atorvastatin (LIPITOR) 20 mg tablet TAKE 1 TABLET BY MOUTH EVERY DAY 30 tablet 5   • Diclofenac Sodium (VOLTAREN) 1 % Apply 4 g topically 4 (four) times a day 100 g 0   • glucose blood (Contour Next Test) test strip TEST BLOOD SUGAR TWICE DAILY 100 strip 3   • ketorolac (ACULAR) 0 5 % ophthalmic solution PLEASE SEE ATTACHED FOR DETAILED DIRECTIONS     • Lancets Micro Thin 33G MISC TEST BLOOD SUGAR 2 TIMES DAILY 100 each 3   • levothyroxine 112 mcg tablet Take 1 tablet (112 mcg total) by mouth daily in the early morning 30 tablet 2   • metFORMIN (GLUCOPHAGE) 500 mg tablet Take 1 tablet (500 mg total) by mouth daily 30 tablet 2   • polymyxin b-trimethoprim (POLYTRIM) ophthalmic solution PLEASE SEE ATTACHED FOR DETAILED DIRECTIONS     • prednisoLONE acetate (PRED FORTE) 1 % ophthalmic suspension PLEASE SEE ATTACHED FOR DETAILED DIRECTIONS       No current facility-administered medications for this visit        Allergies:     No Known Allergies   Physical Exam:     /78 (BP Location: Left arm, Patient Position: Sitting, Cuff Size: Large)   Pulse 73 Temp 98 6 °F (37 °C) (Tympanic)   Resp 16   Ht 5' 3 5" (1 613 m)   Wt 78 5 kg (173 lb)   SpO2 94%   BMI 30 16 kg/m²     Physical Exam  Vitals and nursing note reviewed  Constitutional:       General: She is not in acute distress  Appearance: She is well-developed  HENT:      Head: Normocephalic and atraumatic  Right Ear: Tympanic membrane normal       Left Ear: Tympanic membrane normal       Nose: Nose normal       Mouth/Throat:      Pharynx: Oropharynx is clear  Eyes:      Conjunctiva/sclera: Conjunctivae normal    Cardiovascular:      Rate and Rhythm: Normal rate and regular rhythm  Heart sounds: No murmur heard  Pulmonary:      Effort: Pulmonary effort is normal  No respiratory distress  Breath sounds: Normal breath sounds  Abdominal:      Palpations: Abdomen is soft  Tenderness: There is no abdominal tenderness  Musculoskeletal:         General: No swelling  Cervical back: Neck supple  Right hip: No deformity or tenderness  Normal range of motion  Left hip: Tenderness present  No deformity, bony tenderness or crepitus  Normal range of motion  Normal strength  Right lower leg: No edema  Left lower leg: No edema  Skin:     General: Skin is warm and dry  Capillary Refill: Capillary refill takes less than 2 seconds  Findings: No erythema or rash  Neurological:      Mental Status: She is alert and oriented to person, place, and time     Psychiatric:         Mood and Affect: Mood normal           Pinky Houston MD  [address] [place] [address]

## 2022-12-20 NOTE — ASSESSMENT & PLAN NOTE
Patient traveling By airplane to [facility] and she gets anxiety from airplane equesting medication to help her recommend alprazolam 0 25 mg proper use and possible side effects constipation

## 2022-12-20 NOTE — ASSESSMENT & PLAN NOTE
New diagnosis symptomatic no recent fall or trauma no swelling pain localized in the left hip no radiation recommend Voltaren gel to apply 4 g 4 times a day proper use and possible side effect discussed with patient proper posture and importance lose weight review if no improvement to call the office

## 2022-12-20 NOTE — ASSESSMENT & PLAN NOTE
Advice and education were given regarding nutrition, aerobic exercises, weight-bearing exercises, cardiovascular risk reduction, fall risk reduction, and age-appropriate supplements  The patient was counseled regarding instructions for management, risk factor reductions, prognosis, risks and benefits of treatment options, patient and family education, and importance of compliance with treatment

## 2022-12-20 NOTE — ASSESSMENT & PLAN NOTE
BMI today 30 16 discussed with the patient portion control low-carb low-fat diet and increase physical activity

## 2022-12-28 ENCOUNTER — HOSPITAL ENCOUNTER (OUTPATIENT)
Dept: MAMMOGRAPHY | Facility: CLINIC | Age: 62
Discharge: HOME | End: 2022-12-28

## 2022-12-28 DIAGNOSIS — Z12.31 SCREENING MAMMOGRAM, ENCOUNTER FOR: ICD-10-CM

## 2023-01-04 NOTE — RESULT ENCOUNTER NOTE
Abnormal mammogram of left breast  RECOMMENDATION:       - Diagnostic mammogram at the current time for the left breast        - Ultrasound at the current time for the left breast

## 2023-01-12 ENCOUNTER — TELEPHONE (OUTPATIENT)
Dept: FAMILY MEDICINE CLINIC | Facility: CLINIC | Age: 63
End: 2023-01-12

## 2023-01-12 DIAGNOSIS — Z12.31 SCREENING MAMMOGRAM, ENCOUNTER FOR: Primary | ICD-10-CM

## 2023-01-12 NOTE — TELEPHONE ENCOUNTER
----- Message from Penny Hamm MD sent at 1/4/2023 11:52 AM EST -----  Abnormal mammogram of left breast  RECOMMENDATION:       - Diagnostic mammogram at the current time for the left breast        - Ultrasound at the current time for the left breast

## 2023-02-01 ENCOUNTER — OFFICE VISIT (OUTPATIENT)
Dept: GASTROENTEROLOGY | Facility: CLINIC | Age: 63
End: 2023-02-01

## 2023-02-01 VITALS
HEIGHT: 64 IN | DIASTOLIC BLOOD PRESSURE: 78 MMHG | TEMPERATURE: 96.7 F | BODY MASS INDEX: 29.67 KG/M2 | SYSTOLIC BLOOD PRESSURE: 120 MMHG | WEIGHT: 173.8 LBS

## 2023-02-01 DIAGNOSIS — K86.2 PANCREAS CYST: ICD-10-CM

## 2023-02-01 DIAGNOSIS — R10.13 DYSPEPSIA: Primary | ICD-10-CM

## 2023-02-01 NOTE — PROGRESS NOTES
[facility] - Outpatient Follow-up Note  Fatoumata Leyva 60 y o  female MRN: [id number]  Encounter: [id number]          ASSESSMENT AND PLAN:      1  Abdominal pain  2  Bloating  3  GERD    Abdominal pain has resolved  Patient had gastritis on EGD  Patient has chronic bloating and GERD which are intermittent symptoms  She would like to continue taking PPI on as-needed basis  4   Pancreatic cyst  5  CBD dilation    CBD dilation secondary to cholecystectomy  Pancreatic cyst is small and nonsuspicious for any concerning lesions on EUS  Repeat MRI in 1 to 2 years to confirm stability of the cyst     6   Colon cancer screening  Patient needs repeat colonoscopy in 2025     7  Lower back pain  Acute setting  Recommended patient contact primary care for further management  RTC 6 months  Luis M Olvera MD  Gastroenterology  [place] [address]  Date: February 1, 2023    ______________________________________________________________________    SUBJECTIVE:   69-year-old with diabetes, hypothyroidism status postcholecystectomy presents for follow-up  Last visit patient reported abdominal pain, bloating, chronic GERD  MRI showed dilated CBD and pancreatic duct as well as pancreatic lesion  EGD and endoscopic ultrasound was ordered  EGD showed gastritis  Stomach and duodenal biopsies were normal   Endoscopy ultrasound short small subcentimeter cyst in the body of the pancreas without any other suspicious lesions  CBD dilation s/p cholecystectomy  Patient returns to clinic today and reports that she developed lower back pain when she got up from seated position after watching TV  He reports that abdominal pain has resolved  She had intermittent bloating and acid reflux without any worsening  She takes pantoprazole as needed when she has symptoms  She does not want to make it scheduled  Normal bowel movements  No blood in stools      REVIEW OF SYSTEMS IS OTHERWISE NEGATIVE        Historical Information   Past Medical History:   Diagnosis Date   • Anxiety 3/29/2019   • Asthma    • Cataract     viral   • Chronic kidney disease    • Hyperlipidemia    • Hypertension    • Hypothyroid    • IFG (impaired fasting glucose) 2019   • Incisional hernia    • Insomnia    • Kidney stone     in past   • Left upper quadrant pain 2019   • Lower abdominal pain 2020   • Osteoarthritis    • [facility] 2015    pt unsure    • Type 2 diabetes mellitus without complication, without long-term current use of insulin ([facility] ) 2019    NIDDM   • Uses Slovak as primary spoken language    • Varicose veins of both lower extremities    • Wears dentures      upper partials   • Wears glasses      Past Surgical History:   Procedure Laterality Date   • APPENDECTOMY     • BREAST BIOPSY Right     negative   •  SECTION      x 3   • CHOLECYSTECTOMY     • COLONOSCOPY     • HERNIA REPAIR     • HYSTERECTOMY     • [place] LAP RPR HRNA XCPT INCAL/INGUN NCRC8/STRANGULATED N/A 2019    Procedure: REPAIR HERNIA INCISIONAL LAPAROSCOPIC W/ ROBOTICS;  Surgeon: Cara Tovar MD;  Location: [facility] OR;  Service: General     Social History   Social History     Substance and Sexual Activity   Alcohol Use Yes    Comment: liquor     Social History     Substance and Sexual Activity   Drug Use No     Social History     Tobacco Use   Smoking Status Never   Smokeless Tobacco Never   Tobacco Comments    no passive smoke exposure     Family History   Problem Relation Age of Onset   • Emphysema Mother    • No Known Problems Sister    • No Known Problems Daughter    • No Known Problems Sister    • No Known Problems Sister    • No Known Problems Sister    • No Known Problems Paternal Aunt    • No Known Problems Maternal Aunt    • No Known Problems Maternal Aunt        Meds/Allergies       Current Outpatient Medications:   •  atorvastatin (LIPITOR) 20 mg tablet  • levothyroxine 112 mcg tablet  •  metFORMIN (GLUCOPHAGE) 500 mg tablet  •  ALPRAZolam (XANAX) 0 25 mg tablet  •  Diclofenac Sodium (VOLTAREN) 1 %  •  glucose blood (Contour Next Test) test strip  •  ketorolac (ACULAR) 0 5 % ophthalmic solution  •  Lancets Micro Thin 33G MISC  •  polymyxin b-trimethoprim (POLYTRIM) ophthalmic solution  •  prednisoLONE acetate (PRED FORTE) 1 % ophthalmic suspension    No Known Allergies        Objective     Blood pressure 120/78, temperature (!) 96 7 °F (35 9 °C), temperature source Tympanic, height 5' 4" (1 626 m), weight 78 8 kg (173 lb 12 8 oz), not currently breastfeeding  Body mass index is 29 83 kg/m²  PHYSICAL EXAM:      General Appearance:   Alert, cooperative, no distress   HEENT:   Normocephalic, atraumatic, anicteric      Neck:  Supple, symmetrical, trachea midline   Lungs:   Clear to auscultation bilaterally; no rales, rhonchi or wheezing; respirations unlabored    Heart[de-identified]   Regular rate and rhythm; no murmur, rub, or gallop  Abdomen:   Soft, non-tender, non-distended; normal bowel sounds; no masses, no organomegaly    Genitalia:   Deferred    Rectal:   Deferred    Extremities:  No cyanosis, clubbing or edema    Pulses:  2+ and symmetric    Skin:  No jaundice, rashes, or lesions    Lymph nodes:  No palpable cervical lymphadenopathy        Lab Results:   No visits with results within 1 Day(s) from this visit     Latest known visit with results is:   Hospital Outpatient Visit on 12/06/2022   Component Date Value   • POC Glucose 12/06/2022 126    • Case Report 12/06/2022                      Value:Surgical Pathology Report                         Case: [id number]                                   Authorizing Provider:  Jian Adams MD            Collected:           12/06/2022 0932              Ordering Location:     [address] [place] [address]      Received:            12/06/2022 [address] [place] [address] Endoscopy Pathologist:           Lan Bertrand MD                                                                  Specimens:   A) - Duodenum, Duodenum bx                                                                          B) - Stomach, Stomach bx                                                                  • Final Diagnosis 12/06/2022                      Value: This result contains rich text formatting which cannot be displayed here  • Additional Information 12/06/2022                      Value: This result contains rich text formatting which cannot be displayed here  • Gross Description 12/06/2022                      Value: This result contains rich text formatting which cannot be displayed here  Radiology Results:   No results found

## 2023-02-14 ENCOUNTER — HOSPITAL ENCOUNTER (OUTPATIENT)
Dept: MAMMOGRAPHY | Facility: CLINIC | Age: 63
Discharge: HOME | End: 2023-02-14

## 2023-02-14 DIAGNOSIS — R92.8 ABNORMAL SCREENING MAMMOGRAM: ICD-10-CM

## 2023-02-15 DIAGNOSIS — N64.4 BREAST PAIN, LEFT: Primary | ICD-10-CM

## 2023-02-16 ENCOUNTER — TELEPHONE (OUTPATIENT)
Dept: FAMILY MEDICINE CLINIC | Facility: CLINIC | Age: 63
End: 2023-02-16

## 2023-02-16 NOTE — TELEPHONE ENCOUNTER
----- Message from Juvencio Wilkins MD sent at 2/14/2023  3:51 PM EST -----  To schedule diagnostic mammogram of left breast in 6 month

## 2023-03-25 ENCOUNTER — LAB (OUTPATIENT)
Dept: LAB | Facility: HOSPITAL | Age: 63
End: 2023-03-25

## 2023-03-25 DIAGNOSIS — E11.9 TYPE 2 DIABETES MELLITUS WITHOUT COMPLICATION, WITHOUT LONG-TERM CURRENT USE OF INSULIN (HCC): ICD-10-CM

## 2023-03-25 DIAGNOSIS — G89.29 HIP PAIN, CHRONIC, LEFT: ICD-10-CM

## 2023-03-25 DIAGNOSIS — F41.8 SITUATIONAL ANXIETY: ICD-10-CM

## 2023-03-25 DIAGNOSIS — Z00.01 ENCOUNTER FOR WELL ADULT EXAM WITH ABNORMAL FINDINGS: ICD-10-CM

## 2023-03-25 DIAGNOSIS — M25.552 HIP PAIN, CHRONIC, LEFT: ICD-10-CM

## 2023-03-25 DIAGNOSIS — E03.9 ACQUIRED HYPOTHYROIDISM: ICD-10-CM

## 2023-03-25 LAB
ALBUMIN SERPL BCP-MCNC: 4.1 G/DL (ref 3.5–5)
ALP SERPL-CCNC: 78 U/L (ref 34–104)
ALT SERPL W P-5'-P-CCNC: 18 U/L (ref 7–52)
ANION GAP SERPL CALCULATED.3IONS-SCNC: 6 MMOL/L (ref 4–13)
AST SERPL W P-5'-P-CCNC: 20 U/L (ref 13–39)
BASOPHILS # BLD AUTO: 0.02 THOUSANDS/ÂΜL (ref 0–0.1)
BASOPHILS NFR BLD AUTO: 0 % (ref 0–1)
BILIRUB SERPL-MCNC: 0.56 MG/DL (ref 0.2–1)
BUN SERPL-MCNC: 14 MG/DL (ref 5–25)
CALCIUM SERPL-MCNC: 9.2 MG/DL (ref 8.4–10.2)
CHLORIDE SERPL-SCNC: 107 MMOL/L (ref 96–108)
CHOLEST SERPL-MCNC: 146 MG/DL
CO2 SERPL-SCNC: 26 MMOL/L (ref 21–32)
CREAT SERPL-MCNC: 0.72 MG/DL (ref 0.6–1.3)
EOSINOPHIL # BLD AUTO: 0.26 THOUSAND/ÂΜL (ref 0–0.61)
EOSINOPHIL NFR BLD AUTO: 4 % (ref 0–6)
ERYTHROCYTE [DISTWIDTH] IN BLOOD BY AUTOMATED COUNT: 12.3 % (ref 11.6–15.1)
EST. AVERAGE GLUCOSE BLD GHB EST-MCNC: 137 MG/DL
GFR SERPL CREATININE-BSD FRML MDRD: 88 ML/MIN/1.73SQ M
GLUCOSE P FAST SERPL-MCNC: 132 MG/DL (ref 65–99)
HBA1C MFR BLD: 6.4 %
HCT VFR BLD AUTO: 41.1 % (ref 34.8–46.1)
HDLC SERPL-MCNC: 43 MG/DL
HGB BLD-MCNC: 13.7 G/DL (ref 11.5–15.4)
IMM GRANULOCYTES # BLD AUTO: 0.01 THOUSAND/UL (ref 0–0.2)
IMM GRANULOCYTES NFR BLD AUTO: 0 % (ref 0–2)
LDLC SERPL CALC-MCNC: 85 MG/DL (ref 0–100)
LYMPHOCYTES # BLD AUTO: 2.01 THOUSANDS/ÂΜL (ref 0.6–4.47)
LYMPHOCYTES NFR BLD AUTO: 32 % (ref 14–44)
MCH RBC QN AUTO: 29.3 PG (ref 26.8–34.3)
MCHC RBC AUTO-ENTMCNC: 33.3 G/DL (ref 31.4–37.4)
MCV RBC AUTO: 88 FL (ref 82–98)
MONOCYTES # BLD AUTO: 0.39 THOUSAND/ÂΜL (ref 0.17–1.22)
MONOCYTES NFR BLD AUTO: 6 % (ref 4–12)
NEUTROPHILS # BLD AUTO: 3.56 THOUSANDS/ÂΜL (ref 1.85–7.62)
NEUTS SEG NFR BLD AUTO: 58 % (ref 43–75)
NRBC BLD AUTO-RTO: 0 /100 WBCS
PLATELET # BLD AUTO: 220 THOUSANDS/UL (ref 149–390)
PMV BLD AUTO: 10.9 FL (ref 8.9–12.7)
POTASSIUM SERPL-SCNC: 4.1 MMOL/L (ref 3.5–5.3)
PROT SERPL-MCNC: 6.9 G/DL (ref 6.4–8.4)
RBC # BLD AUTO: 4.68 MILLION/UL (ref 3.81–5.12)
SODIUM SERPL-SCNC: 139 MMOL/L (ref 135–147)
T4 FREE SERPL-MCNC: 1.09 NG/DL (ref 0.76–1.46)
TRIGL SERPL-MCNC: 88 MG/DL
TSH SERPL DL<=0.05 MIU/L-ACNC: 13.19 UIU/ML (ref 0.45–4.5)
WBC # BLD AUTO: 6.25 THOUSAND/UL (ref 4.31–10.16)

## 2023-04-03 ENCOUNTER — OFFICE VISIT (OUTPATIENT)
Dept: FAMILY MEDICINE CLINIC | Facility: CLINIC | Age: 63
End: 2023-04-03

## 2023-04-03 VITALS
BODY MASS INDEX: 29.4 KG/M2 | HEIGHT: 64 IN | OXYGEN SATURATION: 97 % | SYSTOLIC BLOOD PRESSURE: 110 MMHG | HEART RATE: 65 BPM | TEMPERATURE: 97.6 F | DIASTOLIC BLOOD PRESSURE: 64 MMHG | WEIGHT: 172.2 LBS

## 2023-04-03 DIAGNOSIS — E78.2 MIXED HYPERLIPIDEMIA: ICD-10-CM

## 2023-04-03 DIAGNOSIS — E11.9 TYPE 2 DIABETES MELLITUS WITHOUT COMPLICATION, WITHOUT LONG-TERM CURRENT USE OF INSULIN (HCC): Primary | ICD-10-CM

## 2023-04-03 DIAGNOSIS — E03.9 ACQUIRED HYPOTHYROIDISM: ICD-10-CM

## 2023-04-03 RX ORDER — ALBUTEROL SULFATE 90 UG/1
AEROSOL, METERED RESPIRATORY (INHALATION)
COMMUNITY
Start: 2023-03-28

## 2023-04-03 NOTE — PROGRESS NOTES
Name: Bobby Blood      : 1960      MRN: [id number]  Encounter Provider: Janeann Gosselin, MD  Encounter Date: 4/3/2023   Encounter department: [place] [address]  Type 2 diabetes mellitus without complication, without long-term current use of insulin ([facility])  Assessment & Plan:    Lab Results   Component Value Date    HGBA1C 6 4 (H) 2023     Chronic asymptomatic fair control continue metformin 500 mg once a day  Low-carb diet important lose weight reviewed    Orders:  -     CBC and differential; Future  -     Basic metabolic panel; Future  -     Lipid Panel with Direct LDL reflex; Future  -     TSH, 3rd generation with Free T4 reflex; Future  -     Hemoglobin A1C; Future  -     Microalbumin / creatinine urine ratio    2  Acquired hypothyroidism  Assessment & Plan:  Chronic and controlled patient was away in [facility] for couple weeks and that she has not been taking her medication discussed importance compliant with the medication continue current dose and recheck TSH    Orders:  -     CBC and differential; Future  -     Basic metabolic panel; Future  -     Lipid Panel with Direct LDL reflex; Future  -     TSH, 3rd generation with Free T4 reflex; Future    3  Mixed hyperlipidemia  Assessment & Plan:  Chronic asymptomatic fair control continue with atorvastatin 20 mg once a day low-fat diet reviewed    Orders:  -     CBC and differential; Future  -     Basic metabolic panel; Future  -     Lipid Panel with Direct LDL reflex; Future  -     TSH, 3rd generation with Free T4 reflex; Future           Subjective      Patient here for follow-up of the chronic condition compliant with the medication tolerated well without side effect no new concerns recent blood work reviewed with the patient    Review of Systems   Constitutional: Negative for chills and fever  HENT: Negative for ear pain and sore throat  Eyes: Negative for pain and visual disturbance  "  Respiratory: Negative for cough and shortness of breath  Cardiovascular: Negative for chest pain and palpitations  Gastrointestinal: Negative for abdominal pain and vomiting  Genitourinary: Negative for dysuria and hematuria  Musculoskeletal: Negative for arthralgias and back pain  Skin: Negative for color change and rash  Neurological: Negative for seizures and syncope  All other systems reviewed and are negative        Current Outpatient Medications on File Prior to Visit   Medication Sig   • albuterol (PROVENTIL HFA,VENTOLIN HFA) 90 mcg/act inhaler inhale 1 puff by mouth and INTO THE LUNGS every 4 hours   • atorvastatin (LIPITOR) 20 mg tablet TAKE 1 TABLET BY MOUTH EVERY DAY   • glucose blood (Contour Next Test) test strip TEST BLOOD SUGAR TWICE DAILY   • Lancets Micro Thin 33G MISC TEST BLOOD SUGAR 2 TIMES DAILY   • levothyroxine 112 mcg tablet Take 1 tablet (112 mcg total) by mouth daily in the early morning   • metFORMIN (GLUCOPHAGE) 500 mg tablet Take 1 tablet (500 mg total) by mouth daily   • ALPRAZolam (XANAX) 0 25 mg tablet Take 1 tablet (0 25 mg total) by mouth daily at bedtime as needed for anxiety (Patient not taking: Reported on 2/1/2023)   • Diclofenac Sodium (VOLTAREN) 1 % Apply 4 g topically 4 (four) times a day (Patient not taking: Reported on 2/1/2023)   • ketorolac (ACULAR) 0 5 % ophthalmic solution PLEASE SEE ATTACHED FOR DETAILED DIRECTIONS (Patient not taking: Reported on 2/1/2023)   • polymyxin b-trimethoprim (POLYTRIM) ophthalmic solution PLEASE SEE ATTACHED FOR DETAILED DIRECTIONS (Patient not taking: Reported on 2/1/2023)   • prednisoLONE acetate (PRED FORTE) 1 % ophthalmic suspension PLEASE SEE ATTACHED FOR DETAILED DIRECTIONS (Patient not taking: Reported on 4/3/2023)       Objective     /64 (BP Location: Left arm, Patient Position: Sitting)   Pulse 65   Temp 97 6 °F (36 4 °C) (Tympanic)   Ht 5' 4\" (1 626 m)   Wt 78 1 kg (172 lb 3 2 oz)   SpO2 97%   BMI 29 56 " kg/m²     Physical Exam  Constitutional:       General: She is not in acute distress  Appearance: She is well-developed  She is not diaphoretic  HENT:      Head: Normocephalic  Right Ear: External ear normal       Left Ear: External ear normal    Eyes:      General:         Right eye: No discharge  Left eye: No discharge  Conjunctiva/sclera: Conjunctivae normal    Neck:      Vascular: No JVD  Cardiovascular:      Rate and Rhythm: Normal rate and regular rhythm  Pulses: no weak pulses          Dorsalis pedis pulses are 2+ on the right side and 2+ on the left side  Heart sounds: Normal heart sounds  No murmur heard  No gallop  Pulmonary:      Effort: Pulmonary effort is normal  No respiratory distress  Breath sounds: Normal breath sounds  No stridor  No wheezing or rales  Chest:      Chest wall: No tenderness  Abdominal:      General: There is no distension  Palpations: Abdomen is soft  There is no mass  Tenderness: There is no abdominal tenderness  There is no rebound  Musculoskeletal:         General: No tenderness  Cervical back: Normal range of motion and neck supple  Feet:      Right foot:      Skin integrity: No warmth  Left foot:      Skin integrity: No warmth  Lymphadenopathy:      Cervical: No cervical adenopathy  Skin:     General: Skin is warm  Findings: No erythema or rash  Neurological:      Mental Status: She is alert and oriented to person, place, and time  Patient's shoes and socks removed  Right Foot/Ankle   Right Foot Inspection  Skin Exam: skin intact  No warmth and no pre-ulcer  Toe Exam: No swelling and erythema    Sensory   Monofilament testing: intact    Vascular  Capillary refills: < 3 seconds  The right DP pulse is 2+  Left Foot/Ankle  Left Foot Inspection  Skin Exam: skin intact  No warmth and no pre-ulcer  Toe Exam: No swelling and no erythema       Sensory   Monofilament testing: intact    Vascular  Capillary refills: < 3 seconds  The left DP pulse is 2+       Assign Risk Category  No deformity present  No loss of protective sensation  No weak pulses  Risk: 0    Ranjan Mendoza MD

## 2023-04-05 PROBLEM — D25.9 LEIOMYOMA OF UTERUS: Status: ACTIVE | Noted: 2023-03-28

## 2023-04-05 NOTE — ASSESSMENT & PLAN NOTE
Chronic and controlled patient was away in [facility] for couple weeks and that she has not been taking her medication discussed importance compliant with the medication continue current dose and recheck TSH

## 2023-04-05 NOTE — ASSESSMENT & PLAN NOTE
Lab Results   Component Value Date    HGBA1C 6 4 (H) 03/25/2023     Chronic asymptomatic fair control continue metformin 500 mg once a day    Low-carb diet important lose weight reviewed

## 2023-05-11 LAB
LEFT EYE DIABETIC RETINOPATHY: NORMAL
RIGHT EYE DIABETIC RETINOPATHY: NORMAL
SEVERITY (EYE EXAM): NORMAL

## 2023-06-10 DIAGNOSIS — E11.9 TYPE 2 DIABETES MELLITUS WITHOUT COMPLICATION, WITHOUT LONG-TERM CURRENT USE OF INSULIN (HCC): ICD-10-CM

## 2023-06-12 RX ORDER — LANCETS 33 GAUGE
EACH MISCELLANEOUS
Qty: 100 EACH | Refills: 3 | Status: SHIPPED | OUTPATIENT
Start: 2023-06-12

## 2023-07-12 DIAGNOSIS — E03.9 ACQUIRED HYPOTHYROIDISM: ICD-10-CM

## 2023-07-12 RX ORDER — LEVOTHYROXINE SODIUM 112 UG/1
112 TABLET ORAL
Qty: 30 TABLET | Refills: 2 | Status: SHIPPED | OUTPATIENT
Start: 2023-07-12

## 2023-08-01 ENCOUNTER — APPOINTMENT (OUTPATIENT)
Dept: LAB | Facility: HOSPITAL | Age: 63
End: 2023-08-01
Payer: COMMERCIAL

## 2023-08-01 DIAGNOSIS — E11.9 TYPE 2 DIABETES MELLITUS WITHOUT COMPLICATION, WITHOUT LONG-TERM CURRENT USE OF INSULIN (HCC): ICD-10-CM

## 2023-08-01 DIAGNOSIS — E03.9 ACQUIRED HYPOTHYROIDISM: ICD-10-CM

## 2023-08-01 DIAGNOSIS — E78.2 MIXED HYPERLIPIDEMIA: ICD-10-CM

## 2023-08-01 LAB
ANION GAP SERPL CALCULATED.3IONS-SCNC: 6 MMOL/L
BASOPHILS # BLD AUTO: 0.02 THOUSANDS/ÂΜL (ref 0–0.1)
BASOPHILS NFR BLD AUTO: 0 % (ref 0–1)
BUN SERPL-MCNC: 14 MG/DL (ref 5–25)
CALCIUM SERPL-MCNC: 9.6 MG/DL (ref 8.4–10.2)
CHLORIDE SERPL-SCNC: 105 MMOL/L (ref 96–108)
CHOLEST SERPL-MCNC: 157 MG/DL
CO2 SERPL-SCNC: 27 MMOL/L (ref 21–32)
CREAT SERPL-MCNC: 0.69 MG/DL (ref 0.6–1.3)
EOSINOPHIL # BLD AUTO: 0.22 THOUSAND/ÂΜL (ref 0–0.61)
EOSINOPHIL NFR BLD AUTO: 3 % (ref 0–6)
ERYTHROCYTE [DISTWIDTH] IN BLOOD BY AUTOMATED COUNT: 11.9 % (ref 11.6–15.1)
EST. AVERAGE GLUCOSE BLD GHB EST-MCNC: 148 MG/DL
GFR SERPL CREATININE-BSD FRML MDRD: 92 ML/MIN/1.73SQ M
GLUCOSE P FAST SERPL-MCNC: 131 MG/DL (ref 65–99)
HBA1C MFR BLD: 6.8 %
HCT VFR BLD AUTO: 43.7 % (ref 34.8–46.1)
HDLC SERPL-MCNC: 43 MG/DL
HGB BLD-MCNC: 14.4 G/DL (ref 11.5–15.4)
IMM GRANULOCYTES # BLD AUTO: 0.02 THOUSAND/UL (ref 0–0.2)
IMM GRANULOCYTES NFR BLD AUTO: 0 % (ref 0–2)
LDLC SERPL CALC-MCNC: 86 MG/DL (ref 0–100)
LYMPHOCYTES # BLD AUTO: 3.14 THOUSANDS/ÂΜL (ref 0.6–4.47)
LYMPHOCYTES NFR BLD AUTO: 44 % (ref 14–44)
MCH RBC QN AUTO: 29.1 PG (ref 26.8–34.3)
MCHC RBC AUTO-ENTMCNC: 33 G/DL (ref 31.4–37.4)
MCV RBC AUTO: 89 FL (ref 82–98)
MONOCYTES # BLD AUTO: 0.49 THOUSAND/ÂΜL (ref 0.17–1.22)
MONOCYTES NFR BLD AUTO: 7 % (ref 4–12)
NEUTROPHILS # BLD AUTO: 3.32 THOUSANDS/ÂΜL (ref 1.85–7.62)
NEUTS SEG NFR BLD AUTO: 46 % (ref 43–75)
NRBC BLD AUTO-RTO: 0 /100 WBCS
PLATELET # BLD AUTO: 221 THOUSANDS/UL (ref 149–390)
PMV BLD AUTO: 11.5 FL (ref 8.9–12.7)
POTASSIUM SERPL-SCNC: 4 MMOL/L (ref 3.5–5.3)
RBC # BLD AUTO: 4.94 MILLION/UL (ref 3.81–5.12)
SODIUM SERPL-SCNC: 138 MMOL/L (ref 135–147)
TRIGL SERPL-MCNC: 138 MG/DL
TSH SERPL DL<=0.05 MIU/L-ACNC: 0.6 UIU/ML (ref 0.45–4.5)
WBC # BLD AUTO: 7.21 THOUSAND/UL (ref 4.31–10.16)

## 2023-08-01 PROCEDURE — 80061 LIPID PANEL: CPT

## 2023-08-01 PROCEDURE — 84443 ASSAY THYROID STIM HORMONE: CPT

## 2023-08-01 PROCEDURE — 80048 BASIC METABOLIC PNL TOTAL CA: CPT

## 2023-08-01 PROCEDURE — 36415 COLL VENOUS BLD VENIPUNCTURE: CPT

## 2023-08-01 PROCEDURE — 85025 COMPLETE CBC W/AUTO DIFF WBC: CPT

## 2023-08-01 PROCEDURE — 83036 HEMOGLOBIN GLYCOSYLATED A1C: CPT

## 2023-08-07 ENCOUNTER — OFFICE VISIT (OUTPATIENT)
Dept: FAMILY MEDICINE CLINIC | Facility: CLINIC | Age: 63
End: 2023-08-07
Payer: COMMERCIAL

## 2023-08-07 VITALS
OXYGEN SATURATION: 95 % | DIASTOLIC BLOOD PRESSURE: 82 MMHG | TEMPERATURE: 99.3 F | HEIGHT: 64 IN | BODY MASS INDEX: 29.84 KG/M2 | WEIGHT: 174.8 LBS | SYSTOLIC BLOOD PRESSURE: 132 MMHG | HEART RATE: 76 BPM

## 2023-08-07 DIAGNOSIS — E11.9 TYPE 2 DIABETES MELLITUS WITHOUT COMPLICATION, WITHOUT LONG-TERM CURRENT USE OF INSULIN (HCC): Primary | ICD-10-CM

## 2023-08-07 DIAGNOSIS — I10 ESSENTIAL HYPERTENSION: ICD-10-CM

## 2023-08-07 DIAGNOSIS — G89.29 CHRONIC PAIN OF LEFT KNEE: ICD-10-CM

## 2023-08-07 DIAGNOSIS — M25.562 CHRONIC PAIN OF LEFT KNEE: ICD-10-CM

## 2023-08-07 DIAGNOSIS — E03.9 ACQUIRED HYPOTHYROIDISM: ICD-10-CM

## 2023-08-07 DIAGNOSIS — E78.2 MIXED HYPERLIPIDEMIA: ICD-10-CM

## 2023-08-07 PROCEDURE — 99214 OFFICE O/P EST MOD 30 MIN: CPT | Performed by: FAMILY MEDICINE

## 2023-08-07 RX ORDER — PERPHENAZINE 16 MG/1
TABLET, FILM COATED ORAL
Qty: 100 STRIP | Refills: 3 | Status: SHIPPED | OUTPATIENT
Start: 2023-08-07

## 2023-08-07 NOTE — ASSESSMENT & PLAN NOTE
Lab Results   Component Value Date    HGBA1C 6.8 (H) 08/01/2023   Recent blood work August 1 increase in the hemoglobin A1c compared with before continue with the metformin 500 once a day encourage patient to watch with a low-carb diet important lose weight she is already on statin

## 2023-08-07 NOTE — PROGRESS NOTES
Name: Alyssa Cervantes      : 1960      MRN: [id number]  Encounter Provider: Teddy Castellon MD  Encounter Date: 2023   Encounter department: [address] [place] [address]     1. Type 2 diabetes mellitus without complication, without long-term current use of insulin ([facility])  Assessment & Plan:    Lab Results   Component Value Date    HGBA1C 6.8 (H) 2023   Recent blood work  increase in the hemoglobin A1c compared with before continue with the metformin 500 once a day encourage patient to watch with a low-carb diet important lose weight she is already on statin    Orders:  -     glucose blood (Contour Next Test) test strip; TEST BLOOD SUGAR TWICE DAILY  -     Lipid Panel with Direct LDL reflex; Future  -     Hemoglobin A1C; Future    2. Chronic pain of left knee  Assessment & Plan:  New diagnosis of chronic pain has been going on for more than 3 months no history of trauma recommend to apply Voltaren gel 4 g 4 times a day proper use and possible side effect discussed with patient discussed with patient potential lose weight    Orders:  -     Diclofenac Sodium (VOLTAREN) 1 %; Apply 4 g topically 4 (four) times a day  -     Lipid Panel with Direct LDL reflex; Future  -     Hemoglobin A1C; Future    3. Mixed hyperlipidemia  Assessment & Plan:  Chronic asymptomatic LDL subtherapeutic in diabetic patient encouraged patient to watch with a low-fat diet continue atorvastatin 20 mg    Orders:  -     Lipid Panel with Direct LDL reflex; Future  -     Hemoglobin A1C; Future    4. Acquired hypothyroidism  Assessment & Plan:  Chronic asymptomatic fair control continue with the levothyroxine 112 mcg once a day TSH is normal    Orders:  -     Lipid Panel with Direct LDL reflex; Future  -     Hemoglobin A1C; Future    5. Essential hypertension  -     Lipid Panel with Direct LDL reflex;  Future  -     Hemoglobin A1C; Future           Subjective      Patient here follow-up with a chronic condition as she is concerned about left knee pain that has been going on for more than 1 months no fall no trauma pain is worse when she go up and down the steps and when she stands on it for a long time no redness no swelling pain localized in the knee no radiation patient is a 49 diabetic she is compliant with the medication but not compliant with a low-carb diet pain related to visit her hemoglobin A1c increased compared with before result discussed with the patient    Review of Systems   Constitutional: Negative for chills and fever. HENT: Negative for ear pain and sore throat. Eyes: Negative for pain and visual disturbance. Respiratory: Negative for cough and shortness of breath. Cardiovascular: Negative for chest pain and palpitations. Gastrointestinal: Negative for abdominal pain, constipation, diarrhea and vomiting. Genitourinary: Negative for dysuria and hematuria. Musculoskeletal: Positive for arthralgias. Negative for back pain. Skin: Negative for color change and rash. Neurological: Negative for seizures and syncope. All other systems reviewed and are negative. Current Outpatient Medications on File Prior to Visit   Medication Sig   • atorvastatin (LIPITOR) 20 mg tablet TAKE 1 TABLET BY MOUTH EVERY DAY   • Lancets (OneTouch Delica Plus [id number]) MISC TEST BLOOD SUGAR 2 TIMES DAILY   • levothyroxine 112 mcg tablet TAKE 1 TABLET (112 MCG TOTAL) BY MOUTH DAILY IN THE EARLY MORNING   • metFORMIN (GLUCOPHAGE) 500 mg tablet TAKE 1 TABLET (500 MG TOTAL) BY MOUTH DAILY.    • albuterol (PROVENTIL HFA,VENTOLIN HFA) 90 mcg/act inhaler inhale 1 puff by mouth and INTO THE LUNGS every 4 hours (Patient not taking: Reported on 8/7/2023)   • ketorolac (ACULAR) 0.5 % ophthalmic solution PLEASE SEE ATTACHED FOR DETAILED DIRECTIONS (Patient not taking: Reported on 2/1/2023)   • polymyxin b-trimethoprim (POLYTRIM) ophthalmic solution PLEASE SEE ATTACHED FOR DETAILED DIRECTIONS (Patient not taking: Reported on 2/1/2023)   • prednisoLONE acetate (PRED FORTE) 1 % ophthalmic suspension PLEASE SEE ATTACHED FOR DETAILED DIRECTIONS (Patient not taking: Reported on 4/3/2023)       Objective     /82 (BP Location: Left arm, Patient Position: Sitting, Cuff Size: Standard)   Pulse 76   Temp 99.3 °F (37.4 °C) (Tympanic)   Ht 5' 4.33" (1.634 m)   Wt 79.3 kg (174 lb 12.8 oz)   SpO2 95%   BMI 29.70 kg/m²     Physical Exam  Vitals and nursing note reviewed. Constitutional:       General: She is not in acute distress. Appearance: She is well-developed. She is not diaphoretic. HENT:      Head: Normocephalic. Right Ear: External ear normal.      Left Ear: External ear normal.      Nose: No rhinorrhea. Mouth/Throat:      Pharynx: No posterior oropharyngeal erythema. Eyes:      General:         Right eye: No discharge. Left eye: No discharge. Conjunctiva/sclera: Conjunctivae normal.   Neck:      Vascular: No JVD. Cardiovascular:      Rate and Rhythm: Normal rate and regular rhythm. Heart sounds: Normal heart sounds. No murmur heard. No gallop. Pulmonary:      Effort: Pulmonary effort is normal. No respiratory distress. Breath sounds: Normal breath sounds. No stridor. No wheezing or rales. Chest:      Chest wall: No tenderness. Abdominal:      General: There is no distension. Palpations: Abdomen is soft. There is no mass. Tenderness: There is no abdominal tenderness. There is no rebound. Musculoskeletal:         General: Tenderness present. Cervical back: Normal range of motion and neck supple. Left knee: Normal range of motion. Tenderness present over the medial joint line and lateral joint line. Lymphadenopathy:      Cervical: No cervical adenopathy. Skin:     General: Skin is warm. Findings: No erythema or rash. Neurological:      Mental Status: She is alert and oriented to person, place, and time.        Shaan Kim Gabriele Santos MD

## 2023-08-07 NOTE — ASSESSMENT & PLAN NOTE
Chronic asymptomatic LDL subtherapeutic in diabetic patient encouraged patient to watch with a low-fat diet continue atorvastatin 20 mg

## 2023-08-08 NOTE — ASSESSMENT & PLAN NOTE
New diagnosis of chronic pain has been going on for more than 3 months no history of trauma recommend to apply Voltaren gel 4 g 4 times a day proper use and possible side effect discussed with patient discussed with patient potential lose weight

## 2023-08-09 DIAGNOSIS — E03.9 ACQUIRED HYPOTHYROIDISM: ICD-10-CM

## 2023-08-09 RX ORDER — LEVOTHYROXINE SODIUM 112 UG/1
112 TABLET ORAL
Qty: 90 TABLET | Refills: 0 | Status: SHIPPED | OUTPATIENT
Start: 2023-08-09

## 2023-08-15 ENCOUNTER — HOSPITAL ENCOUNTER (OUTPATIENT)
Dept: MAMMOGRAPHY | Facility: CLINIC | Age: 63
Discharge: HOME | End: 2023-08-15
Payer: COMMERCIAL

## 2023-08-15 VITALS — WEIGHT: 174 LBS | HEIGHT: 64 IN | BODY MASS INDEX: 29.71 KG/M2

## 2023-08-15 DIAGNOSIS — N64.4 BREAST PAIN, LEFT: ICD-10-CM

## 2023-08-15 PROCEDURE — 76642 ULTRASOUND BREAST LIMITED: CPT

## 2023-08-15 PROCEDURE — G0279 TOMOSYNTHESIS, MAMMO: HCPCS

## 2023-08-15 PROCEDURE — 77065 DX MAMMO INCL CAD UNI: CPT

## 2023-08-15 NOTE — RESULT ENCOUNTER NOTE
Benign finding  To schedule   Diagnostic mammogram in 6 months for the left breast.       - Ultrasound in 6 months for the left breast.

## 2023-08-16 ENCOUNTER — TELEPHONE (OUTPATIENT)
Dept: FAMILY MEDICINE CLINIC | Facility: CLINIC | Age: 63
End: 2023-08-16

## 2023-08-16 DIAGNOSIS — R92.8 ABNORMAL MAMMOGRAM OF LEFT BREAST: Primary | ICD-10-CM

## 2023-08-16 NOTE — TELEPHONE ENCOUNTER
Called and spoke with patient to advise results. Patient is scheduled for US and mammo for 6 months on 2/20/24 at 3 pm at [facility] in [facility].

## 2023-08-16 NOTE — TELEPHONE ENCOUNTER
----- Message from Javier Kelley MD sent at 8/15/2023  7:50 PM EDT -----  Benign finding  To schedule   Diagnostic mammogram in 6 months for the left breast.       - Ultrasound in 6 months for the left breast.

## 2023-08-25 ENCOUNTER — OFFICE VISIT (OUTPATIENT)
Dept: FAMILY MEDICINE CLINIC | Facility: CLINIC | Age: 63
End: 2023-08-25
Payer: COMMERCIAL

## 2023-08-25 VITALS
TEMPERATURE: 97.2 F | DIASTOLIC BLOOD PRESSURE: 86 MMHG | WEIGHT: 171.8 LBS | HEART RATE: 71 BPM | OXYGEN SATURATION: 98 % | BODY MASS INDEX: 29.33 KG/M2 | SYSTOLIC BLOOD PRESSURE: 130 MMHG | HEIGHT: 64 IN

## 2023-08-25 DIAGNOSIS — R00.2 PALPITATION: Primary | ICD-10-CM

## 2023-08-25 DIAGNOSIS — R06.02 SOB (SHORTNESS OF BREATH): ICD-10-CM

## 2023-08-25 PROCEDURE — 99214 OFFICE O/P EST MOD 30 MIN: CPT | Performed by: FAMILY MEDICINE

## 2023-08-25 PROCEDURE — 93000 ELECTROCARDIOGRAM COMPLETE: CPT | Performed by: FAMILY MEDICINE

## 2023-08-25 NOTE — PROGRESS NOTES
Name: Nixon Lutz      : 1960      MRN: [id number]  Encounter Provider: Harjinder Mathews MD  Encounter Date: 2023   Encounter department: [facility] [address]     1. Palpitation  Assessment & Plan:  New diagnosis new diagnosis symptomatic associated with short of breath and patient with history of diabetes hypertension EKG in the office no significant finding recommend to do echocardiogram to rule out valvular disease and Holter monitor to rule out arrhythmia patient has history of hyper thyroidism TSH within normal range of recent blood work    Orders:  -     Echo stress test, exercise; Future; Expected date: 2023  -     Holter monitor; Future; Expected date: 2023  -     POCT ECG    2. SOB (shortness of breath)  Assessment & Plan:  Diagnosis symptomatic  with palpitation no chest pain no cough no wheezing no hematosis no lower extremity edema EKG in the office no significant finding plan for stress echo discussed with patient if symptom gets worse to go to the emergency room    Orders:  -     Echo stress test, exercise; Future; Expected date: 2023  -     Holter monitor; Future; Expected date: 2023           Subjective      Patient in office and her daughter who live in [place] was on phone during visit to help with translation patient not history for diabetic  Improve current hypotension came to the office concerned about palpitation she feels her heartbeat in her chest and can happen during the day during the night not associated with any chest pain no cough no wheezing but associated with shortness of breath no lower extremity edema no change in her diet no recent traveling no cough no hematosis    Review of Systems   Constitutional: Negative for chills and fever. HENT: Negative for congestion, ear pain and sore throat. Eyes: Negative for pain and visual disturbance.    Respiratory: Negative for cough and shortness of breath. Cardiovascular: Positive for palpitations. Negative for chest pain and leg swelling. Gastrointestinal: Negative for abdominal pain, blood in stool, constipation and vomiting. Genitourinary: Negative for dysuria and hematuria. Musculoskeletal: Negative for arthralgias and back pain. Skin: Negative for color change and rash. Neurological: Negative for seizures and syncope. All other systems reviewed and are negative. Current Outpatient Medications on File Prior to Visit   Medication Sig   • atorvastatin (LIPITOR) 20 mg tablet TAKE 1 TABLET BY MOUTH EVERY DAY   • Diclofenac Sodium (VOLTAREN) 1 % Apply 4 g topically 4 (four) times a day   • glucose blood (Contour Next Test) test strip TEST BLOOD SUGAR TWICE DAILY   • Lancets (OneTouch Delica Plus [id number]) MISC TEST BLOOD SUGAR 2 TIMES DAILY   • levothyroxine 112 mcg tablet TAKE 1 TABLET (112 MCG TOTAL) BY MOUTH DAILY IN THE EARLY MORNING   • metFORMIN (GLUCOPHAGE) 500 mg tablet TAKE 1 TABLET (500 MG TOTAL) BY MOUTH DAILY. • albuterol (PROVENTIL HFA,VENTOLIN HFA) 90 mcg/act inhaler inhale 1 puff by mouth and INTO THE LUNGS every 4 hours (Patient not taking: Reported on 8/7/2023)       Objective     /86 (BP Location: Left arm, Patient Position: Sitting)   Pulse 71   Temp (!) 97.2 °F (36.2 °C) (Tympanic)   Ht 5' 4.25" (1.632 m)   Wt 77.9 kg (171 lb 12.8 oz)   SpO2 98%   BMI 29.26 kg/m²     Physical Exam  Vitals and nursing note reviewed. Constitutional:       General: She is not in acute distress. Appearance: She is well-developed. She is not diaphoretic. HENT:      Head: Normocephalic. Right Ear: External ear normal.      Left Ear: External ear normal.      Nose: No rhinorrhea. Mouth/Throat:      Pharynx: No posterior oropharyngeal erythema. Eyes:      General:         Right eye: No discharge. Left eye: No discharge. Conjunctiva/sclera: Conjunctivae normal.   Neck:      Vascular: No JVD. Cardiovascular:      Rate and Rhythm: Normal rate and regular rhythm. Heart sounds: Normal heart sounds. No murmur heard. No gallop. Pulmonary:      Effort: Pulmonary effort is normal. No respiratory distress. Breath sounds: Normal breath sounds. No stridor. No wheezing or rales. Chest:      Chest wall: No tenderness. Abdominal:      General: There is no distension. Palpations: Abdomen is soft. There is no mass. Tenderness: There is no abdominal tenderness. There is no rebound. Musculoskeletal:         General: No tenderness. Cervical back: Normal range of motion and neck supple. Lymphadenopathy:      Cervical: No cervical adenopathy. Skin:     General: Skin is warm. Findings: No erythema or rash. Neurological:      Mental Status: She is alert and oriented to person, place, and time.        Melissa Moon MD

## 2023-08-27 NOTE — ASSESSMENT & PLAN NOTE
New diagnosis new diagnosis symptomatic associated with short of breath and patient with history of diabetes hypertension EKG in the office no significant finding recommend to do echocardiogram to rule out valvular disease and Holter monitor to rule out arrhythmia patient has history of hyper thyroidism TSH within normal range of recent blood work

## 2023-08-27 NOTE — ASSESSMENT & PLAN NOTE
Diagnosis symptomatic  with palpitation no chest pain no cough no wheezing no hematosis no lower extremity edema EKG in the office no significant finding plan for stress echo discussed with patient if symptom gets worse to go to the emergency room

## 2023-09-06 ENCOUNTER — HOSPITAL ENCOUNTER (OUTPATIENT)
Dept: NON INVASIVE DIAGNOSTICS | Facility: HOSPITAL | Age: 63
Discharge: HOME | End: 2023-09-06
Payer: COMMERCIAL

## 2023-09-06 VITALS
HEIGHT: 64 IN | DIASTOLIC BLOOD PRESSURE: 82 MMHG | RESPIRATION RATE: 16 BRPM | HEART RATE: 74 BPM | WEIGHT: 171 LBS | BODY MASS INDEX: 29.19 KG/M2 | OXYGEN SATURATION: 96 % | SYSTOLIC BLOOD PRESSURE: 144 MMHG

## 2023-09-06 DIAGNOSIS — R00.2 PALPITATION: ICD-10-CM

## 2023-09-06 DIAGNOSIS — R06.02 SOB (SHORTNESS OF BREATH): ICD-10-CM

## 2023-09-06 LAB
AORTIC VALVE MEAN VELOCITY: 6.8 M/S
AV LVOT MEAN GRADIENT: 2 MMHG
AV LVOT PEAK GRADIENT: 3 MMHG
AV MEAN GRADIENT: 2 MMHG
AV PEAK GRADIENT: 4 MMHG
AV VELOCITY RATIO: 0.95
CHEST PAIN STATEMENT: NORMAL
DOP CALC AO PEAK VEL: 0.98 M/S
DOP CALC AO VTI: 24.08 CM
DOP CALC LVOT PEAK VEL VTI: 23.68 CM
DOP CALC LVOT PEAK VEL: 0.93 M/S
E WAVE DECELERATION TIME: 222 MS
MAX DIASTOLIC BP: 70 MMHG
MAX HEART RATE: 122 BPM
MAX HR PERCENT: 78 %
MAX HR: 122 BPM
MAX PREDICTED HEART RATE: 156 BPM
MAX. SYSTOLIC BP: 176 MMHG
MV E'TISSUE VEL-SEP: 10 CM/S
MV PEAK A VEL: 0.96 M/S
MV PEAK E VEL: 68 CM/S
MV STENOSIS PRESSURE HALF TIME: 64 MS
MV VALVE AREA P 1/2 METHOD: 3.44 CM2
PROTOCOL NAME: NORMAL
RATE PRESSURE PRODUCT: NORMAL
SL CV STRESS RECOVERY BP: NORMAL MMHG
SL CV STRESS RECOVERY HR: 72 BPM
SL CV STRESS RECOVERY O2 SAT: 97 %
SL CV STRESS STAGE REACHED: 1
STRESS ANGINA INDEX: 0
STRESS BASELINE BP: NORMAL MMHG
STRESS BASELINE HR: 74 BPM
STRESS O2 SAT REST: 96 %
STRESS PEAK HR: 122 BPM
STRESS POST ESTIMATED WORKLOAD: 4.6 METS
STRESS POST EXERCISE DUR MIN: 2 MIN
STRESS POST EXERCISE DUR SEC: 19 SEC
STRESS POST O2 SAT PEAK: 97 %
STRESS POST PEAK BP: 176 MMHG
TARGET HR FORMULA: NORMAL
TEST INDICATION: NORMAL
TIME IN EXERCISE PHASE: NORMAL
TR MAX PG: 19 MMHG
TR PEAK VELOCITY: 2.2 M/S
TRICUSPID VALVE PEAK REGURGITATION VELOCITY: 2.2 M/S

## 2023-09-06 PROCEDURE — 93225 XTRNL ECG REC<48 HRS REC: CPT

## 2023-09-06 PROCEDURE — 93226 XTRNL ECG REC<48 HR SCAN A/R: CPT

## 2023-09-06 PROCEDURE — 93350 STRESS TTE ONLY: CPT | Performed by: INTERNAL MEDICINE

## 2023-09-06 PROCEDURE — 93350 STRESS TTE ONLY: CPT

## 2023-09-11 PROCEDURE — 93227 XTRNL ECG REC<48 HR R&I: CPT

## 2023-09-18 ENCOUNTER — TELEPHONE (OUTPATIENT)
Dept: ADMINISTRATIVE | Facility: OTHER | Age: 63
End: 2023-09-18

## 2023-09-18 NOTE — TELEPHONE ENCOUNTER
----- Message from Delsie Kussmaul sent at 9/18/2023  9:57 AM EDT -----  Regarding: care gap request  09/18/23 9:57 AM    Hello, our patient attached above has had Diabetic Eye Exam completed/performed. Please assist in updating the patient chart by pulling the document from the Media Tab. The date of service is 5/11/2023.      Thank you,  [address] [facility] [address] [place] [address]

## 2023-09-18 NOTE — TELEPHONE ENCOUNTER
Upon review of the In Basket request we were able to locate, review, and update the patient chart as requested for Diabetic Eye Exam.    Any additional questions or concerns should be emailed to the Practice Liaisons via the appropriate education email address, please do not reply via In Basket.     Thank you  Stanton Gunderson

## 2023-10-04 DIAGNOSIS — E78.2 MIXED HYPERLIPIDEMIA: ICD-10-CM

## 2023-10-04 RX ORDER — ATORVASTATIN CALCIUM 20 MG/1
TABLET, FILM COATED ORAL
Qty: 90 TABLET | Refills: 0 | Status: SHIPPED | OUTPATIENT
Start: 2023-10-04

## 2023-10-17 ENCOUNTER — APPOINTMENT (OUTPATIENT)
Dept: LAB | Facility: HOSPITAL | Age: 63
End: 2023-10-17
Payer: COMMERCIAL

## 2023-10-17 DIAGNOSIS — I10 ESSENTIAL HYPERTENSION: ICD-10-CM

## 2023-10-17 DIAGNOSIS — G89.29 CHRONIC PAIN OF LEFT KNEE: ICD-10-CM

## 2023-10-17 DIAGNOSIS — E03.9 ACQUIRED HYPOTHYROIDISM: ICD-10-CM

## 2023-10-17 DIAGNOSIS — E11.9 TYPE 2 DIABETES MELLITUS WITHOUT COMPLICATION, WITHOUT LONG-TERM CURRENT USE OF INSULIN (HCC): ICD-10-CM

## 2023-10-17 DIAGNOSIS — M25.562 CHRONIC PAIN OF LEFT KNEE: ICD-10-CM

## 2023-10-17 DIAGNOSIS — E78.2 MIXED HYPERLIPIDEMIA: ICD-10-CM

## 2023-10-17 LAB
CHOLEST SERPL-MCNC: 150 MG/DL
EST. AVERAGE GLUCOSE BLD GHB EST-MCNC: 154 MG/DL
HBA1C MFR BLD: 7 %
HDLC SERPL-MCNC: 50 MG/DL
LDLC SERPL CALC-MCNC: 77 MG/DL (ref 0–100)
TRIGL SERPL-MCNC: 115 MG/DL

## 2023-10-17 PROCEDURE — 36415 COLL VENOUS BLD VENIPUNCTURE: CPT

## 2023-10-17 PROCEDURE — 80061 LIPID PANEL: CPT

## 2023-10-17 PROCEDURE — 83036 HEMOGLOBIN GLYCOSYLATED A1C: CPT

## 2023-10-24 ENCOUNTER — TELEPHONE (OUTPATIENT)
Dept: FAMILY MEDICINE CLINIC | Facility: CLINIC | Age: 63
End: 2023-10-24

## 2023-10-24 DIAGNOSIS — R06.02 SOB (SHORTNESS OF BREATH): ICD-10-CM

## 2023-10-24 DIAGNOSIS — R00.2 PALPITATION: Primary | ICD-10-CM

## 2023-10-24 NOTE — TELEPHONE ENCOUNTER
----- Message from Farzana Novak MD sent at 10/23/2023  9:08 AM EDT -----  Refer patient to see Cardiology

## 2023-11-12 DIAGNOSIS — E03.9 ACQUIRED HYPOTHYROIDISM: ICD-10-CM

## 2023-11-13 RX ORDER — LEVOTHYROXINE SODIUM 112 UG/1
112 TABLET ORAL
Qty: 90 TABLET | Refills: 0 | Status: SHIPPED | OUTPATIENT
Start: 2023-11-13 | End: 2023-11-14 | Stop reason: SDUPTHER

## 2023-11-14 ENCOUNTER — OFFICE VISIT (OUTPATIENT)
Dept: FAMILY MEDICINE CLINIC | Facility: CLINIC | Age: 63
End: 2023-11-14
Payer: COMMERCIAL

## 2023-11-14 VITALS
SYSTOLIC BLOOD PRESSURE: 120 MMHG | HEART RATE: 74 BPM | BODY MASS INDEX: 29.19 KG/M2 | TEMPERATURE: 97.7 F | HEIGHT: 64 IN | DIASTOLIC BLOOD PRESSURE: 62 MMHG | OXYGEN SATURATION: 99 % | WEIGHT: 171 LBS

## 2023-11-14 DIAGNOSIS — K92.1 BLOOD IN STOOL: Primary | ICD-10-CM

## 2023-11-14 DIAGNOSIS — E11.9 TYPE 2 DIABETES MELLITUS WITHOUT COMPLICATION, WITHOUT LONG-TERM CURRENT USE OF INSULIN (HCC): ICD-10-CM

## 2023-11-14 DIAGNOSIS — E03.9 ACQUIRED HYPOTHYROIDISM: ICD-10-CM

## 2023-11-14 DIAGNOSIS — M54.2 NECK PAIN: ICD-10-CM

## 2023-11-14 PROCEDURE — 99214 OFFICE O/P EST MOD 30 MIN: CPT | Performed by: FAMILY MEDICINE

## 2023-11-14 RX ORDER — METHOCARBAMOL 500 MG/1
500 TABLET, FILM COATED ORAL 2 TIMES DAILY
Qty: 20 TABLET | Refills: 0 | Status: SHIPPED | OUTPATIENT
Start: 2023-11-14

## 2023-11-14 RX ORDER — LEVOTHYROXINE SODIUM 112 UG/1
112 TABLET ORAL
Qty: 90 TABLET | Refills: 0 | Status: SHIPPED | OUTPATIENT
Start: 2023-11-14

## 2023-11-14 NOTE — PROGRESS NOTES
Name: Chaitanya Pizarro      : 1960      MRN: [id number]  Encounter Provider: Katharina De Leon MD  Encounter Date: 2023   Encounter department: [address] [place] [address]     1. Blood in stool  Assessment & Plan:  New diagnosis symptomatic patient noticed blood in the stool associated with loose stool and mucus in for most a week. No abdomen pain no weight change no recent travel no weight loss no diet change had a colonoscopy done in  no abnormal finding  Recommend a CAT scan of the abdomen abdominal exam recommend CBC with diff      Orders:  -     CT abdomen pelvis wo contrast; Future; Expected date: 2023  -     CBC and differential; Future    2. Neck pain  Assessment & Plan:  New diagnosis symptomatic secondary to muscle spasm recommend to use Robaxin 500 mg 1 tablet twice a day proper use and possible side effect discussed with patient if no improvement to call the office    Orders:  -     methocarbamol (ROBAXIN) 500 mg tablet; Take 1 tablet (500 mg total) by mouth 2 (two) times a day    3. Type 2 diabetes mellitus without complication, without long-term current use of insulin ([facility])  Assessment & Plan:    Lab Results   Component Value Date    HGBA1C 7.0 (H) 10/17/2023     Chronic asymptomatic slight increase in the hemoglobin A1c compared with before encourage the patient to continue with a low-carb diet continue with the metformin 500 mg discussed important lose weight      4. Acquired hypothyroidism  -     levothyroxine 112 mcg tablet; Take 1 tablet (112 mcg total) by mouth daily in the early morning        Depression Screening and Follow-up Plan: Patient was screened for depression during today's encounter. They screened negative with a PHQ-2 score of 0.         Subjective      Patient today concerned about the blood in the stool for the last 2 weeks she noticed there is a blood in her stools with the mucus and the stool is more loose denies change in her diet no recent sick travel no abdomen pain no fever no weight loss discussed had a similar problem she had already colonoscopy 2015 I reviewed the record with her also she concerned about neck pain and the pain radiated to her left shoulder since the range of motion of the head aggravate the pain no numbness no muscle weakness no fall no trauma it is worse at the end of the day patient history of diabetes and metformin she been compliant with the medication but not compliant with the diet recent blood work reviewed      Review of Systems   Constitutional:  Negative for chills and fever. HENT:  Negative for congestion, ear pain, sinus pressure, sinus pain and sore throat. Eyes:  Negative for pain and visual disturbance. Respiratory:  Negative for cough and shortness of breath. Cardiovascular:  Negative for chest pain and palpitations. Gastrointestinal:  Positive for blood in stool and diarrhea. Negative for abdominal pain, constipation and vomiting. Genitourinary:  Negative for dysuria and hematuria. Musculoskeletal:  Positive for neck pain. Skin:  Negative for color change and rash. Neurological:  Negative for seizures and syncope. All other systems reviewed and are negative. Current Outpatient Medications on File Prior to Visit   Medication Sig   • atorvastatin (LIPITOR) 20 mg tablet TAKE 1 TABLET BY MOUTH EVERY DAY   • glucose blood (Contour Next Test) test strip TEST BLOOD SUGAR TWICE DAILY   • Lancets (OneTouch Delica Plus [id number]) MISC TEST BLOOD SUGAR 2 TIMES DAILY   • metFORMIN (GLUCOPHAGE) 500 mg tablet TAKE 1 TABLET (500 MG TOTAL) BY MOUTH DAILY.    • albuterol (PROVENTIL HFA,VENTOLIN HFA) 90 mcg/act inhaler inhale 1 puff by mouth and INTO THE LUNGS every 4 hours (Patient not taking: Reported on 8/7/2023)       Objective     /62 (BP Location: Left arm, Patient Position: Sitting)   Pulse 74   Temp 97.7 °F (36.5 °C) (Tympanic)   Ht 5' 4" (1.626 m)   Wt 77.6 kg (171 lb)   SpO2 99%   BMI 29.35 kg/m²     Physical Exam  Vitals and nursing note reviewed. Constitutional:       General: She is not in acute distress. Appearance: She is not diaphoretic. HENT:      Head: Normocephalic and atraumatic. Right Ear: Tympanic membrane normal. There is no impacted cerumen. Left Ear: Tympanic membrane normal. There is no impacted cerumen. Nose: Nose normal. No congestion or rhinorrhea. Mouth/Throat:      Pharynx: No posterior oropharyngeal erythema. Eyes:      General: No scleral icterus. Right eye: No discharge. Left eye: No discharge. Cardiovascular:      Rate and Rhythm: Normal rate and regular rhythm. Heart sounds: No murmur heard. Pulmonary:      Effort: Pulmonary effort is normal. No respiratory distress. Abdominal:      General: There is no distension. Tenderness: There is no abdominal tenderness. Musculoskeletal:         General: Normal range of motion. Cervical back: Normal range of motion. Spasms and tenderness present. No signs of trauma or bony tenderness. Right lower leg: No edema. Left lower leg: No edema. Skin:     Findings: No rash. Neurological:      Mental Status: She is alert and oriented to person, place, and time.        Fabio Shah MD

## 2023-11-15 PROBLEM — M54.2 NECK PAIN: Status: ACTIVE | Noted: 2023-11-14

## 2023-11-15 PROBLEM — M54.2 NECK PAIN: Status: ACTIVE | Noted: 2023-11-15

## 2023-11-15 PROBLEM — K92.1 BLOOD IN STOOL: Status: ACTIVE | Noted: 2023-11-15

## 2023-11-15 NOTE — ASSESSMENT & PLAN NOTE
Lab Results   Component Value Date    HGBA1C 7.0 (H) 10/17/2023     Chronic asymptomatic slight increase in the hemoglobin A1c compared with before encourage the patient to continue with a low-carb diet continue with the metformin 500 mg discussed important lose weight

## 2023-11-15 NOTE — ASSESSMENT & PLAN NOTE
New diagnosis symptomatic patient noticed blood in the stool associated with loose stool and mucus in for most a week.   No abdomen pain no weight change no recent travel no weight loss no diet change had a colonoscopy done in 2015 no abnormal finding  Recommend a CAT scan of the abdomen abdominal exam recommend CBC with diff

## 2023-11-15 NOTE — ASSESSMENT & PLAN NOTE
New diagnosis symptomatic secondary to muscle spasm recommend to use Robaxin 500 mg 1 tablet twice a day proper use and possible side effect discussed with patient if no improvement to call the office

## 2023-11-22 ENCOUNTER — OFFICE VISIT (OUTPATIENT)
Dept: FAMILY MEDICINE CLINIC | Facility: CLINIC | Age: 63
End: 2023-11-22
Payer: COMMERCIAL

## 2023-11-22 ENCOUNTER — APPOINTMENT (OUTPATIENT)
Dept: LAB | Facility: HOSPITAL | Age: 63
End: 2023-11-22
Payer: COMMERCIAL

## 2023-11-22 VITALS
OXYGEN SATURATION: 97 % | BODY MASS INDEX: 29.16 KG/M2 | SYSTOLIC BLOOD PRESSURE: 118 MMHG | HEIGHT: 64 IN | WEIGHT: 170.8 LBS | TEMPERATURE: 96.3 F | HEART RATE: 78 BPM | DIASTOLIC BLOOD PRESSURE: 64 MMHG

## 2023-11-22 DIAGNOSIS — K92.1 BLOOD IN STOOL: Primary | ICD-10-CM

## 2023-11-22 DIAGNOSIS — K92.1 BLOOD IN STOOL: ICD-10-CM

## 2023-11-22 DIAGNOSIS — K57.92 ACUTE DIVERTICULITIS: ICD-10-CM

## 2023-11-22 LAB
BASOPHILS # BLD AUTO: 0.02 THOUSANDS/ÂΜL (ref 0–0.1)
BASOPHILS NFR BLD AUTO: 0 % (ref 0–1)
EOSINOPHIL # BLD AUTO: 0.19 THOUSAND/ÂΜL (ref 0–0.61)
EOSINOPHIL NFR BLD AUTO: 3 % (ref 0–6)
ERYTHROCYTE [DISTWIDTH] IN BLOOD BY AUTOMATED COUNT: 12.1 % (ref 11.6–15.1)
HCT VFR BLD AUTO: 41.9 % (ref 34.8–46.1)
HGB BLD-MCNC: 14.2 G/DL (ref 11.5–15.4)
IMM GRANULOCYTES # BLD AUTO: 0.01 THOUSAND/UL (ref 0–0.2)
IMM GRANULOCYTES NFR BLD AUTO: 0 % (ref 0–2)
LYMPHOCYTES # BLD AUTO: 2.3 THOUSANDS/ÂΜL (ref 0.6–4.47)
LYMPHOCYTES NFR BLD AUTO: 34 % (ref 14–44)
MCH RBC QN AUTO: 29.5 PG (ref 26.8–34.3)
MCHC RBC AUTO-ENTMCNC: 33.9 G/DL (ref 31.4–37.4)
MCV RBC AUTO: 87 FL (ref 82–98)
MONOCYTES # BLD AUTO: 0.36 THOUSAND/ÂΜL (ref 0.17–1.22)
MONOCYTES NFR BLD AUTO: 5 % (ref 4–12)
NEUTROPHILS # BLD AUTO: 3.92 THOUSANDS/ÂΜL (ref 1.85–7.62)
NEUTS SEG NFR BLD AUTO: 58 % (ref 43–75)
NRBC BLD AUTO-RTO: 0 /100 WBCS
PLATELET # BLD AUTO: 219 THOUSANDS/UL (ref 149–390)
PMV BLD AUTO: 11.2 FL (ref 8.9–12.7)
RBC # BLD AUTO: 4.82 MILLION/UL (ref 3.81–5.12)
WBC # BLD AUTO: 6.8 THOUSAND/UL (ref 4.31–10.16)

## 2023-11-22 PROCEDURE — 36415 COLL VENOUS BLD VENIPUNCTURE: CPT

## 2023-11-22 PROCEDURE — 99214 OFFICE O/P EST MOD 30 MIN: CPT | Performed by: FAMILY MEDICINE

## 2023-11-22 PROCEDURE — 85025 COMPLETE CBC W/AUTO DIFF WBC: CPT

## 2023-11-22 RX ORDER — AMOXICILLIN AND CLAVULANATE POTASSIUM 875; 125 MG/1; MG/1
1 TABLET, FILM COATED ORAL EVERY 12 HOURS SCHEDULED
Qty: 20 TABLET | Refills: 0 | Status: SHIPPED | OUTPATIENT
Start: 2023-11-22 | End: 2023-12-02

## 2023-11-22 NOTE — PROGRESS NOTES
Name: Olena Da Silva      : 1960      MRN: [id number]  Encounter Provider: Naida Sosa MD  Encounter Date: 2023   Encounter department: [facility] [address]     1. Blood in stool  Assessment & Plan:  Symptomatic possible secondary to acute diverticulitis patient scheduled to have a CAT scan abdomen and pelvic on  she did not do the CBC yet we ordered in case this was well emergency room recommend to follow-up with GI    Orders:  -     Ambulatory Referral to Gastroenterology; Future    2. Acute diverticulitis  Assessment & Plan:  Acute symptomatic associated with abdomen pain no mucus in the stool loose stool no blood in the stool recommend Augmentin twice a day for 10 days proper use and possible side effect discussed with patient in case get worse to go to the emergency room    Orders:  -     amoxicillin-clavulanate (AUGMENTIN) 875-125 mg per tablet; Take 1 tablet by mouth every 12 (twelve) hours for 10 days  -     Ambulatory Referral to Gastroenterology; Future           Subjective      Patient here concerned about abdomen pain in her left lower quadrant that is associated with a blood in the stool mucus in the stool and her stool is more loose no fever no decrease in intake no nausea vomiting no change in the diet no recent travel colonoscopy report reviewed patient already scheduled to have CAT scan of the abdomen and pelvic this       Review of Systems   Constitutional:  Negative for chills and fever. HENT:  Negative for ear pain and sore throat. Eyes:  Negative for pain and visual disturbance. Respiratory:  Negative for cough and shortness of breath. Cardiovascular:  Negative for chest pain and palpitations. Gastrointestinal:  Positive for abdominal pain and blood in stool. Negative for constipation, diarrhea and vomiting. Genitourinary:  Negative for dysuria and hematuria.    Musculoskeletal:  Negative for arthralgias and back pain. Skin:  Negative for color change and rash. Neurological:  Negative for seizures and syncope. All other systems reviewed and are negative. Current Outpatient Medications on File Prior to Visit   Medication Sig   • atorvastatin (LIPITOR) 20 mg tablet TAKE 1 TABLET BY MOUTH EVERY DAY   • glucose blood (Contour Next Test) test strip TEST BLOOD SUGAR TWICE DAILY   • Lancets (OneTouch Delica Plus [id number]) MISC TEST BLOOD SUGAR 2 TIMES DAILY   • levothyroxine 112 mcg tablet Take 1 tablet (112 mcg total) by mouth daily in the early morning   • metFORMIN (GLUCOPHAGE) 500 mg tablet TAKE 1 TABLET (500 MG TOTAL) BY MOUTH DAILY. • methocarbamol (ROBAXIN) 500 mg tablet Take 1 tablet (500 mg total) by mouth 2 (two) times a day   • albuterol (PROVENTIL HFA,VENTOLIN HFA) 90 mcg/act inhaler inhale 1 puff by mouth and INTO THE LUNGS every 4 hours (Patient not taking: Reported on 8/7/2023)       Objective     /64 (BP Location: Left arm, Patient Position: Sitting)   Pulse 78   Temp (!) 96.3 °F (35.7 °C) (Tympanic)   Ht 5' 4" (1.626 m)   Wt 77.5 kg (170 lb 12.8 oz)   SpO2 97%   BMI 29.32 kg/m²     Physical Exam  Vitals and nursing note reviewed. Constitutional:       General: She is not in acute distress. Appearance: She is not diaphoretic. HENT:      Head: Normocephalic and atraumatic. Right Ear: Tympanic membrane normal. There is no impacted cerumen. Left Ear: Tympanic membrane normal. There is no impacted cerumen. Nose: Nose normal. No congestion or rhinorrhea. Mouth/Throat:      Pharynx: No posterior oropharyngeal erythema. Eyes:      General: No scleral icterus. Right eye: No discharge. Left eye: No discharge. Cardiovascular:      Rate and Rhythm: Normal rate and regular rhythm. Heart sounds: No murmur heard. Pulmonary:      Effort: Pulmonary effort is normal. No respiratory distress. Abdominal:      General: There is no distension. Tenderness: There is abdominal tenderness in the left lower quadrant. There is guarding. There is no right CVA tenderness, left CVA tenderness or rebound. Musculoskeletal:         General: Normal range of motion. Cervical back: Normal range of motion. Right lower leg: No edema. Left lower leg: No edema. Skin:     Findings: No rash. Neurological:      Mental Status: She is alert and oriented to person, place, and time.        Kaela Hinojosa MD

## 2023-11-22 NOTE — ASSESSMENT & PLAN NOTE
Acute symptomatic associated with abdomen pain no mucus in the stool loose stool no blood in the stool recommend Augmentin twice a day for 10 days proper use and possible side effect discussed with patient in case get worse to go to the emergency room

## 2023-11-22 NOTE — ASSESSMENT & PLAN NOTE
Symptomatic possible secondary to acute diverticulitis patient scheduled to have a CAT scan abdomen and pelvic on Sunday she did not do the CBC yet we ordered in case this was well emergency room recommend to follow-up with GI

## 2023-11-26 ENCOUNTER — HOSPITAL ENCOUNTER (OUTPATIENT)
Dept: CT IMAGING | Facility: HOSPITAL | Age: 63
Discharge: HOME | End: 2023-11-26
Payer: COMMERCIAL

## 2023-11-26 DIAGNOSIS — K92.1 BLOOD IN STOOL: ICD-10-CM

## 2023-11-26 PROCEDURE — 74176 CT ABD & PELVIS W/O CONTRAST: CPT

## 2023-11-26 PROCEDURE — G1004 CDSM NDSC: HCPCS

## 2023-11-26 RX ADMIN — IOHEXOL 50 ML: 240 INJECTION, SOLUTION INTRATHECAL; INTRAVASCULAR; INTRAVENOUS; ORAL at 12:46

## 2023-12-01 ENCOUNTER — TELEPHONE (OUTPATIENT)
Dept: FAMILY MEDICINE CLINIC | Facility: CLINIC | Age: 63
End: 2023-12-01

## 2023-12-01 NOTE — TELEPHONE ENCOUNTER
----- Message from Tim Patino MD sent at 11/30/2023  6:54 PM EST -----  No significant abnormality in the abdomen or pelvis.

## 2023-12-01 NOTE — TELEPHONE ENCOUNTER
----- Message from Farzana Novak MD sent at 11/30/2023  6:54 PM EST -----  No significant abnormality in the abdomen or pelvis.

## 2023-12-19 ENCOUNTER — CONSULT (OUTPATIENT)
Dept: CARDIOLOGY CLINIC | Facility: CLINIC | Age: 63
End: 2023-12-19
Payer: COMMERCIAL

## 2023-12-19 VITALS
HEIGHT: 64 IN | WEIGHT: 167.4 LBS | HEART RATE: 58 BPM | DIASTOLIC BLOOD PRESSURE: 80 MMHG | BODY MASS INDEX: 28.58 KG/M2 | SYSTOLIC BLOOD PRESSURE: 138 MMHG

## 2023-12-19 DIAGNOSIS — I10 ESSENTIAL HYPERTENSION: ICD-10-CM

## 2023-12-19 DIAGNOSIS — R00.2 PALPITATION: Primary | ICD-10-CM

## 2023-12-19 DIAGNOSIS — E78.2 MIXED HYPERLIPIDEMIA: ICD-10-CM

## 2023-12-19 DIAGNOSIS — R06.02 SOB (SHORTNESS OF BREATH): ICD-10-CM

## 2023-12-19 PROCEDURE — 99204 OFFICE O/P NEW MOD 45 MIN: CPT | Performed by: INTERNAL MEDICINE

## 2023-12-19 PROCEDURE — 93000 ELECTROCARDIOGRAM COMPLETE: CPT | Performed by: INTERNAL MEDICINE

## 2023-12-19 NOTE — PROGRESS NOTES
[facility]  [address] [place] [address]  Phone#  [phone]   Fax#  [phone]  *-*-*-*-*-*-*-*-*-*-*-*-*-*-*-*-*-*-*-*-*-*-*-*-*-*-*-*-*-*-*-*-*-*-*-*-*-*-*-*-*-*-*-*-*-*-*-*-*-*-*-*-*-*                                              Cardiology Consultation       ENCOUNTER DATE: 23 9:54 AM  PATIENT NAME: Esha Oh   : 1960    MRN: [id number]  AGE:63 y.o.      SEX: female  ENCOUNTER PROVIDER:Harry Quach MD     PRIMARY CARE PHYSICIAN: Shirley Almonte MD    ACTIVE DIAGNOSIS THIS VISIT  1. Palpitation  Ambulatory Referral to Cardiology    POCT ECG      2. SOB (shortness of breath)  Ambulatory Referral to Cardiology    POCT ECG      3. Essential hypertension  POCT ECG      4. Mixed hyperlipidemia  POCT ECG        ACTIVE PROBLEM LIST  Patient Active Problem List   Diagnosis    Mild intermittent asthma without complication    Cervical myofascial pain syndrome    Cervical stenosis of spinal canal    Degeneration of lumbar intervertebral disc    Encounter for monitoring chronic NSAID therapy    Essential hypertension    Hypothyroidism    Gastritis due to nonsteroidal anti-inflammatory drug    Kidney stone    Mixed hyperlipidemia    Neuralgia    Osteoarthritis    Primary osteoarthritis of left knee    Vitamin D deficiency    Immunization refused    Class 1 obesity due to excess calories with serious comorbidity and body mass index (BMI) of 30.0 to 30.9 in adult    Encounter for well adult exam with abnormal findings    Weakness of right arm    LUIS positive    Type 2 diabetes mellitus without complication, without long-term current use of insulin (HCC)    Snoring    Other insomnia    Anxiety    Herpes zoster without complication    Vertigo    Other microscopic hematuria    Varicose veins of left lower extremity with pain    Chronic pain of right knee    Breast pain, left    Pain of left lower extremity    LUQ pain    First degree burn    Generalized abdominal pain     Pre-op examination    Hepatic steatosis    Cataracta    Situational anxiety    Hip pain, chronic, left    Leiomyoma of uterus    Chronic pain of left knee    Palpitation    SOB (shortness of breath)    Blood in stool    Neck pain    Acute diverticulitis       CARDIOLOGY SPECIALTY COMMENTS  Patient referred to cardiology by  Shirley Almonte MD for palpitations and shortness of breath.    9/6/2023 stress echo: Nondiagnostic for ischemia due to failure of patient to reach target heart rate.  Maximum heart rate was 78% of MPHR.  Resting EF 60% with grade 1 diastolic dysfunction.  Severely impaired exercise capacity due to fatigue and shortness of breath.  Stage 2 hypertension at rest.  Normal ECG.    9/6/2023 48-hour Holter monitor: Heart rate ranged from 43 to 136 bpm and average 71 bpm.  48 ventricular ectopic beats.  177 supraventricular ectopic beats.  12 nonsustained atrial runs longest lasting 19 beats at a heart rate of 150 bpm.  Longest R to R interval 1.5 seconds.  No significant bradycardia or heart block.    HPI:    Patient had labs symptoms of palpitations mainly when she lays down at night although sometimes it also occurs during the day.  The discomfort in her chest is real a sensation that her heart is pounding very hard rather than then anginal type chest discomfort.  She does have some shortness of breath with these palpitations but some of the shortness of breath may be provoked by anxiety.  She is concerned that she might have a heart attack or even die.  These episodes last for 5 or 10 minutes and then giuliano.    She had a whole to monitor performed which demonstrates a heart rate ranged from 43 to 136 bpm with 12 episodes of nonsustained supraventricular tachycardia with the longest run lasting for 19 beats at a heart rate of 150 bpm.  Treating this rhythm with a heart rate at times as low as 43 bpm represents a problem.  Treatment would require either a beta-blocker or calcium channel blocker which  would make her heart rate even slower.  I am reluctant to do this because it could bring on syncope.  I do do not feel that her rhythm as it presently is warrant a pacemaker to treat the supraventricular tachycardia    DISCUSSION/PLAN:         I have instructed patient that she needs not to be afraid that something bad is going to happen such as a heart attack or dying.  These palpitations at the present level are not serious and it would be better that she learned to live with them and realize that they are not serious and will go away on their own without causing a heart attack or other serious problem.  I will intermittently evaluate her cardiac rhythm and if the rhythm becomes worse, it may warrant doing something about it.  That we will probably require a pacemaker.  I will continue to try to support and reassure her  I will see her in 3 months  I we will repeat the Holter monitor or order a Zio patch monitor after her next visit.  Her EKG today had small Q waves in V1 and V2.  They were not present on her prior EKG under media on 8/31/2023 and most likely are secondary to poor lead placement of V1 and V2.  Her echocardiogram demonstrates no evidence of a prior anterior wall myocardial infarction.    Lab Studies:    Lab Results   Component Value Date    CHOLESTEROL 150 10/17/2023    CHOLESTEROL 157 08/01/2023    CHOLESTEROL 146 03/25/2023     Lab Results   Component Value Date    TRIG 115 10/17/2023    TRIG 138 08/01/2023    TRIG 88 03/25/2023     Lab Results   Component Value Date    HDL 50 10/17/2023    HDL 43 (L) 08/01/2023    HDL 43 (L) 03/25/2023     Lab Results   Component Value Date    LDLCALC 77 10/17/2023    LDLCALC 86 08/01/2023    LDLCALC 85 03/25/2023     Lab Results   Component Value Date    HGBA1C 7.0 (H) 10/17/2023      Lab Results   Component Value Date    EGFR 92 08/01/2023    EGFR 88 03/25/2023    EGFR 92 10/07/2022    SODIUM 138 08/01/2023    SODIUM 139 03/25/2023    SODIUM 141 10/07/2022     K 4.0 08/01/2023    K 4.1 03/25/2023    K 4.4 10/07/2022     08/01/2023     03/25/2023     10/07/2022    CO2 27 08/01/2023    CO2 26 03/25/2023    CO2 32 10/07/2022    ANIONGAP 6 05/30/2015    ANIONGAP 8 01/17/2015    ANIONGAP 8 10/18/2014    BUN 14 08/01/2023    BUN 14 03/25/2023    BUN 13 10/07/2022    CREATININE 0.69 08/01/2023    CREATININE 0.72 03/25/2023    CREATININE 0.69 10/07/2022     Lab Results   Component Value Date    WBC 6.80 11/22/2023    WBC 7.21 08/01/2023    WBC 6.25 03/25/2023    HGB 14.2 11/22/2023    HGB 14.4 08/01/2023    HGB 13.7 03/25/2023    HCT 41.9 11/22/2023    HCT 43.7 08/01/2023    HCT 41.1 03/25/2023    MCV 87 11/22/2023    MCV 89 08/01/2023    MCV 88 03/25/2023    MCH 29.5 11/22/2023    MCH 29.1 08/01/2023    MCH 29.3 03/25/2023    MCHC 33.9 11/22/2023    MCHC 33.0 08/01/2023    MCHC 33.3 03/25/2023     11/22/2023     08/01/2023     03/25/2023        Lab Results   Component Value Date    GLUCOSE 107 05/30/2015    GLUCOSE 107 01/17/2015    GLUCOSE 111 10/18/2014    CALCIUM 9.6 08/01/2023    CALCIUM 9.2 03/25/2023    CALCIUM 9.5 10/07/2022    AST 20 03/25/2023    AST 31 10/07/2022    AST 21 03/14/2022    ALT 18 03/25/2023    ALT 29 10/07/2022    ALT 29 03/14/2022    ALKPHOS 78 03/25/2023    ALKPHOS 98 10/07/2022    ALKPHOS 98 03/14/2022    PROT 7.8 05/30/2015    PROT 7.3 01/17/2015    PROT 8.0 10/18/2014    BILITOT 0.4 05/30/2015    BILITOT 0.7 01/17/2015    BILITOT 0.4 10/18/2014    MG 2.1 07/26/2019     Lab Results   Component Value Date    TROPONINI <0.02 07/11/2021       Results for orders placed or performed in visit on 12/19/23   POCT ECG    Narrative    Sinus cardia at a rate of 58 bpm.  Incomplete right bundle branch block pattern.  Cannot rule out a prior septal myocardial infarction.  Abnormal EKG.         Current Outpatient Medications:     atorvastatin (LIPITOR) 20 mg tablet, TAKE 1 TABLET BY MOUTH EVERY DAY, Disp: 90 tablet, Rfl: 0     glucose blood (Contour Next Test) test strip, TEST BLOOD SUGAR TWICE DAILY, Disp: 100 strip, Rfl: 3    Lancets (OneTouch Delica Plus [id number]) MISC, TEST BLOOD SUGAR 2 TIMES DAILY, Disp: 100 each, Rfl: 3    levothyroxine 112 mcg tablet, Take 1 tablet (112 mcg total) by mouth daily in the early morning, Disp: 90 tablet, Rfl: 0    metFORMIN (GLUCOPHAGE) 500 mg tablet, TAKE 1 TABLET (500 MG TOTAL) BY MOUTH DAILY., Disp: 30 tablet, Rfl: 2    albuterol (PROVENTIL HFA,VENTOLIN HFA) 90 mcg/act inhaler, inhale 1 puff by mouth and INTO THE LUNGS every 4 hours (Patient not taking: Reported on 8/7/2023), Disp: , Rfl:     methocarbamol (ROBAXIN) 500 mg tablet, Take 1 tablet (500 mg total) by mouth 2 (two) times a day (Patient not taking: Reported on 12/19/2023), Disp: 20 tablet, Rfl: 0  No Known Allergies    Past Medical History:   Diagnosis Date    Anxiety 3/29/2019    Asthma     Cataract     viral    Chronic kidney disease     Hyperlipidemia     Hypertension     Hypothyroid     IFG (impaired fasting glucose) 1/14/2019    Incisional hernia     Insomnia     Kidney stone     in past    Left upper quadrant pain 7/22/2019    Lower abdominal pain 8/24/2020    Osteoarthritis     RA (rheumatoid arthritis) ([facility]) 2/24/2015    pt unsure     Type 2 diabetes mellitus without complication, without long-term current use of insulin ([facility]) 2/4/2019    NIDDM    Uses Estonian as primary spoken language     Varicose veins of both lower extremities     Wears dentures      upper partials    Wears glasses      Social History     Socioeconomic History    Marital status: /Civil Union     Spouse name: Not on file    Number of children: Not on file    Years of education: Not on file    Highest education level: Not on file   Occupational History    Not on file   Tobacco Use    Smoking status: Never     Passive exposure: Never    Smokeless tobacco: Never    Tobacco comments:     no passive smoke exposure   Vaping Use    Vaping status: Never Used    Substance and Sexual Activity    Alcohol use: Yes     Comment: liquor    Drug use: No    Sexual activity: Not Currently     Partners: Male   Other Topics Concern    Not on file   Social History Narrative    Not on file     Social Determinants of Health     Financial Resource Strain: Low Risk  (12/15/2021)    Overall Financial Resource Strain (CARDIA)     Difficulty of Paying Living Expenses: Not hard at all   Food Insecurity: No Food Insecurity (12/15/2021)    Hunger Vital Sign     Worried About Running Out of Food in the Last Year: Never true     Ran Out of Food in the Last Year: Never true   Transportation Needs: No Transportation Needs (5/10/2021)    PRAPARE - Transportation     Lack of Transportation (Medical): No     Lack of Transportation (Non-Medical): No   Physical Activity: Sufficiently Active (12/15/2021)    Exercise Vital Sign     Days of Exercise per Week: 3 days     Minutes of Exercise per Session: 60 min   Stress: No Stress Concern Present (12/15/2021)    [employer] - Occupational Stress Questionnaire     Feeling of Stress : Not at all   Social Connections: Moderately Integrated (12/15/2021)    Social Connection and Isolation Panel [NHANES]     Frequency of Communication with Friends and Family: More than three times a week     Frequency of Social Gatherings with Friends and Family: More than three times a week     Attends Hoahaoism Services: More than 4 times per year     Active Member of Clubs or Organizations: No     Attends Club or Organization Meetings: Never     Marital Status:    Intimate Partner Violence: Not At Risk (12/15/2021)    Humiliation, Afraid, Rape, and Kick questionnaire     Fear of Current or Ex-Partner: No     Emotionally Abused: No     Physically Abused: No     Sexually Abused: No   Housing Stability: Unknown (12/15/2021)    Housing Stability Vital Sign     Unable to Pay for Housing in the Last Year: No     Number of Places Lived in the Last  "Year: Not on file     Unstable Housing in the Last Year: No      Family History   Problem Relation Age of Onset    Heart disease Mother     Emphysema Mother     COPD Father     Heart disease Father     No Known Problems Sister     No Known Problems Sister     No Known Problems Sister     No Known Problems Sister     No Known Problems Daughter     No Known Problems Maternal Grandmother     No Known Problems Maternal Grandfather     No Known Problems Paternal Grandmother     No Known Problems Paternal Grandfather     No Known Problems Maternal Aunt     No Known Problems Maternal Aunt     No Known Problems Paternal Aunt      Past Surgical History:   Procedure Laterality Date    APPENDECTOMY      BREAST BIOPSY Right 1992    negative     SECTION      x 3    CHOLECYSTECTOMY      COLONOSCOPY      EYE SURGERY      HERNIA REPAIR      HYSTERECTOMY      GA LAP RPR HRNA XCPT INCAL/INGUN NCRC8/STRANGULATED N/A 2019    Procedure: REPAIR HERNIA INCISIONAL LAPAROSCOPIC W/ ROBOTICS;  Surgeon: Sadia Sanchez MD;  Location: [facility];  Service: General       Review of Systems:  Review of Systems   Constitutional: Negative.    HENT: Negative.     Respiratory:  Positive for shortness of breath. Negative for cough, choking, chest tightness, wheezing and stridor.    Cardiovascular:  Positive for palpitations. Negative for chest pain and leg swelling.   Gastrointestinal: Negative.    Endocrine: Negative.    Genitourinary: Negative.    Musculoskeletal: Negative.    Skin: Negative.    Allergic/Immunologic: Negative.    Neurological: Negative.    Hematological: Negative.    Psychiatric/Behavioral: Negative.         Physical Exam:  /80   Pulse 58   Ht 5' 4\" (1.626 m)   Wt 75.9 kg (167 lb 6.4 oz)   BMI 28.73 kg/m²     PREVIOUS WEIGHTS:   Wt Readings from Last 10 Encounters:   23 75.9 kg (167 lb 6.4 oz)   23 77.5 kg (170 lb 12.8 oz)   23 77.6 kg (171 lb)   23 77.6 kg (171 lb)   23 77.9 kg " (171 lb 12.8 oz)   08/15/23 78.9 kg (174 lb)   08/07/23 79.3 kg (174 lb 12.8 oz)   04/03/23 78.1 kg (172 lb 3.2 oz)   02/01/23 78.8 kg (173 lb 12.8 oz)   12/19/22 78.5 kg (173 lb)      Physical Exam  Constitutional:       General: She is not in acute distress.     Appearance: She is well-developed.   HENT:      Head: Normocephalic and atraumatic.   Neck:      Thyroid: No thyromegaly.      Vascular: No carotid bruit or JVD.      Trachea: No tracheal deviation.   Cardiovascular:      Rate and Rhythm: Normal rate and regular rhythm.      Pulses: Normal pulses.      Heart sounds: Normal heart sounds. No murmur heard.     No friction rub. No gallop.   Pulmonary:      Effort: Pulmonary effort is normal. No respiratory distress.      Breath sounds: Normal breath sounds. No wheezing, rhonchi or rales.   Chest:      Chest wall: No tenderness.   Abdominal:      General: Bowel sounds are normal. There is no distension.      Palpations: Abdomen is soft.      Tenderness: There is no abdominal tenderness.   Musculoskeletal:         General: Normal range of motion.      Cervical back: Normal range of motion and neck supple.      Right lower leg: No edema.      Left lower leg: No edema.   Skin:     General: Skin is warm and dry.   Neurological:      General: No focal deficit present.      Mental Status: She is alert and oriented to person, place, and time.      Gait: Gait normal.   Psychiatric:         Mood and Affect: Mood normal.         Behavior: Behavior normal.         Thought Content: Thought content normal.         Judgment: Judgment normal.         ======================================================   I have personally reviewed pertinent reports.      I spent 50 minutes on the patient's office visit. This time was spent on the day of the visit. I had direct contact with the patient in the office on the day of the visit. Greater than 50% of the total time was spent obtaining a history, examining patient, answering all  "patient questions, arranging and discussing plan of care with patient as well as directly providing instructions.  Additional time then spent on orders and office chart.     Portions of the record may have been created with voice recognition software. Occasional wrong word or \"sound a like\" substitutions may have occurred due to the inherent limitations of voice recognition software. Read the chart carefully and recognize, using context, where substitutions have occurred.    SIGNATURES:   Harry Quach MD   "

## 2023-12-30 DIAGNOSIS — E78.2 MIXED HYPERLIPIDEMIA: ICD-10-CM

## 2024-01-02 RX ORDER — ATORVASTATIN CALCIUM 20 MG/1
TABLET, FILM COATED ORAL
Qty: 90 TABLET | Refills: 0 | Status: SHIPPED | OUTPATIENT
Start: 2024-01-02

## 2024-01-22 ENCOUNTER — TELEPHONE (OUTPATIENT)
Age: 64
End: 2024-01-22

## 2024-01-22 DIAGNOSIS — E11.9 TYPE 2 DIABETES MELLITUS WITHOUT COMPLICATION, WITHOUT LONG-TERM CURRENT USE OF INSULIN (HCC): Primary | ICD-10-CM

## 2024-01-22 NOTE — TELEPHONE ENCOUNTER
Medication is not on formulary for patient and not covered by insurance.  Please change script to a product on formulary.

## 2024-01-23 ENCOUNTER — OFFICE VISIT (OUTPATIENT)
Dept: FAMILY MEDICINE CLINIC | Facility: CLINIC | Age: 64
End: 2024-01-23
Payer: COMMERCIAL

## 2024-01-23 VITALS
DIASTOLIC BLOOD PRESSURE: 82 MMHG | OXYGEN SATURATION: 98 % | TEMPERATURE: 98.5 F | SYSTOLIC BLOOD PRESSURE: 130 MMHG | HEIGHT: 64 IN | BODY MASS INDEX: 29.06 KG/M2 | HEART RATE: 73 BPM | WEIGHT: 170.2 LBS

## 2024-01-23 DIAGNOSIS — E11.9 TYPE 2 DIABETES MELLITUS WITHOUT COMPLICATION, WITHOUT LONG-TERM CURRENT USE OF INSULIN (HCC): ICD-10-CM

## 2024-01-23 DIAGNOSIS — G47.09 OTHER INSOMNIA: ICD-10-CM

## 2024-01-23 DIAGNOSIS — Z13.29 SCREENING FOR THYROID DISORDER: ICD-10-CM

## 2024-01-23 DIAGNOSIS — Z13.220 SCREENING, LIPID: ICD-10-CM

## 2024-01-23 DIAGNOSIS — Z00.01 ENCOUNTER FOR WELL ADULT EXAM WITH ABNORMAL FINDINGS: Primary | ICD-10-CM

## 2024-01-23 DIAGNOSIS — M54.2 NECK PAIN: ICD-10-CM

## 2024-01-23 DIAGNOSIS — E66.3 OVERWEIGHT (BMI 25.0-29.9): ICD-10-CM

## 2024-01-23 DIAGNOSIS — Z78.0 POST-MENOPAUSE: ICD-10-CM

## 2024-01-23 PROCEDURE — 99396 PREV VISIT EST AGE 40-64: CPT | Performed by: FAMILY MEDICINE

## 2024-01-23 PROCEDURE — 99214 OFFICE O/P EST MOD 30 MIN: CPT | Performed by: FAMILY MEDICINE

## 2024-01-23 RX ORDER — ALBUTEROL SULFATE 90 UG/1
AEROSOL, METERED RESPIRATORY (INHALATION)
Status: CANCELLED | OUTPATIENT
Start: 2024-01-23

## 2024-01-23 RX ORDER — TRAZODONE HYDROCHLORIDE 50 MG/1
50 TABLET ORAL
Qty: 30 TABLET | Refills: 1 | Status: SHIPPED | OUTPATIENT
Start: 2024-01-23

## 2024-01-23 NOTE — PROGRESS NOTES
ADULT ANNUAL PHYSICAL  [facility]    NAME: Esha Oh  AGE: 63 y.o. SEX: female  : 1960     DATE: 2024     Assessment and Plan:     Problem List Items Addressed This Visit     Overweight (BMI 25.0-29.9)     BMI today 29.2 discussed portion control low-carb low-fat diet increase physical activity         Relevant Orders    CBC and differential    Comprehensive metabolic panel    Encounter for well adult exam with abnormal findings - Primary     Advice and education were given regarding nutrition, aerobic exercises, weight-bearing exercises, cardiovascular risk reduction, fall risk reduction, and age-appropriate supplements.     The patient was counseled regarding instructions for management, risk factor reductions, prognosis, risks and benefits of treatment options, patient and family education, and importance of compliance with treatment.         Relevant Orders    CBC and differential    Comprehensive metabolic panel    Type 2 diabetes mellitus without complication, without long-term current use of insulin (HCC)    Relevant Orders    Albumin / creatinine urine ratio    Hemoglobin A1C    Other insomnia     Symptomatic not responding creatinine in the home remedy recommend started trazodone 50 mg once a day proper use and possible side effects reviewed         Relevant Medications    traZODone (DESYREL) 50 mg tablet    Other Relevant Orders    CBC and differential    Comprehensive metabolic panel    Neck pain     Acute on chronic symptomatic no fall no trauma symptomatic of the head aggravate the pain plan for x-ray of the neck recommend Tylenol for the pain         Relevant Orders    XR neck soft tissue    CBC and differential    Comprehensive metabolic panel    Post-menopause     Patient above age 50 postmenopausal vitamin D deficiency recommend screening for osteoporosis discussed with patient and she agree          Relevant Orders    DXA bone density spine hip and pelvis    CBC and differential    Comprehensive metabolic panel   Other Visit Diagnoses     Screening, lipid        Relevant Orders    Lipid Panel with Direct LDL reflex    Screening for thyroid disorder        Relevant Orders    TSH, 3rd generation with Free T4 reflex            Immunizations and preventive care screenings were discussed with patient today. Appropriate education was printed on patient's after visit summary.    Counseling:  Alcohol/drug use: discussed moderation in alcohol intake, the recommendations for healthy alcohol use, and avoidance of illicit drug use.  Dental Health: discussed importance of regular tooth brushing, flossing, and dental visits.  Injury prevention: discussed safety/seat belts, safety helmets, smoke detectors, carbon dioxide detectors, and smoking near bedding or upholstery.  Sexual health: discussed sexually transmitted diseases, partner selection, use of condoms, avoidance of unintended pregnancy, and contraceptive alternatives.  Exercise: the importance of regular exercise/physical activity was discussed. Recommend exercise 3-5 times per week for at least 30 minutes.          Return in about 1 year (around 1/23/2025).     Chief Complaint:     Chief Complaint   Patient presents with   • Insomnia     Unable to sleep   • Neck Pain      History of Present Illness:     Adult Annual Physical   Patient here for a comprehensive physical exam. The patient reports problems - patient has a concern about knee pain she describes it as achy graded as a 5/10 localized in the neck certainly should the motion aggravated the pain no numbness no muscle weakness pain radiated to bilateral shoulders worse on the right side patient also concerned about sleeping genitourinary musculoskeletal and gets tired during the day no snoring and no abnormalMovement of the left hand to sleep .  No change in the medication no change in the diet    Diet and  Physical Activity  Diet/Nutrition: low carb diet.   Exercise: walking.      Depression Screening  PHQ-2/9 Depression Screening    Little interest or pleasure in doing things: 0 - not at all  Feeling down, depressed, or hopeless: 0 - not at all  PHQ-2 Score: 0  PHQ-2 Interpretation: Negative depression screen       General Health  Sleep: sleeps poorly.   Hearing: normal - bilateral.  Vision: wears glasses.   Dental: brushes teeth twice daily.       /GYN Health  Follows with gynecology? yes   Patient is: postmenopausal    Advanced Care Planning  Do you have an advanced directive? no  Do you have a durable medical power of ? no     Review of Systems:     Review of Systems   Constitutional:  Negative for chills and fever.   HENT:  Negative for ear pain and sore throat.    Eyes:  Negative for pain and visual disturbance.   Respiratory:  Negative for cough and shortness of breath.    Cardiovascular:  Negative for chest pain and palpitations.   Gastrointestinal:  Negative for abdominal pain, constipation, diarrhea and vomiting.   Genitourinary:  Negative for dysuria and hematuria.   Musculoskeletal:  Negative for arthralgias and back pain.   Skin:  Negative for color change and rash.   Neurological:  Negative for seizures and syncope.   Psychiatric/Behavioral:  Negative for behavioral problems.    All other systems reviewed and are negative.     Past Medical History:     Past Medical History:   Diagnosis Date   • Anxiety 03/29/2019   • Asthma    • Cataract     viral   • Chronic kidney disease    • Class 1 obesity due to excess calories with serious comorbidity and body mass index (BMI) of 30.0 to 30.9 in adult 12/10/2018   • Herpes zoster without complication 03/06/2020   • Hyperlipidemia    • Hypertension    • Hypothyroid    • IFG (impaired fasting glucose) 01/14/2019   • Incisional hernia    • Insomnia    • Kidney stone     in past   • Left upper quadrant pain 07/22/2019   • Lower abdominal pain 08/24/2020   •  Osteoarthritis    • RA (rheumatoid arthritis) ([facility]) 2015    pt unsure    • Type 2 diabetes mellitus without complication, without long-term current use of insulin ([facility]) 2019    NIDDM   • Uses Filipino as primary spoken language    • Varicose veins of both lower extremities    • Wears dentures      upper partials   • Wears glasses       Past Surgical History:     Past Surgical History:   Procedure Laterality Date   • APPENDECTOMY     • BREAST BIOPSY Right 1992    negative   •  SECTION      x 3   • CHOLECYSTECTOMY     • COLONOSCOPY     • EYE SURGERY     • HERNIA REPAIR     • HYSTERECTOMY     • ND LAP RPR HRNA XCPT INCAL/INGUN NCRC8/STRANGULATED N/A 2019    Procedure: REPAIR HERNIA INCISIONAL LAPAROSCOPIC W/ ROBOTICS;  Surgeon: Sadia Sanchez MD;  Location: [facility];  Service: General      Social History:     Social History     Socioeconomic History   • Marital status: /Civil Union     Spouse name: None   • Number of children: None   • Years of education: None   • Highest education level: None   Occupational History   • None   Tobacco Use   • Smoking status: Never     Passive exposure: Never   • Smokeless tobacco: Never   • Tobacco comments:     no passive smoke exposure   Vaping Use   • Vaping status: Never Used   Substance and Sexual Activity   • Alcohol use: Yes     Comment: liquor   • Drug use: No   • Sexual activity: Not Currently     Partners: Male   Other Topics Concern   • None   Social History Narrative   • None     Social Determinants of Health     Financial Resource Strain: Low Risk  (12/15/2021)    Overall Financial Resource Strain (CARDIA)    • Difficulty of Paying Living Expenses: Not hard at all   Food Insecurity: No Food Insecurity (12/15/2021)    Hunger Vital Sign    • Worried About Running Out of Food in the Last Year: Never true    • Ran Out of Food in the Last Year: Never true   Transportation Needs: No Transportation Needs (5/10/2021)    PRAPARE - Transportation     • Lack of Transportation (Medical): No    • Lack of Transportation (Non-Medical): No   Physical Activity: Sufficiently Active (12/15/2021)    Exercise Vital Sign    • Days of Exercise per Week: 3 days    • Minutes of Exercise per Session: 60 min   Stress: No Stress Concern Present (12/15/2021)    [employer] - Occupational Stress Questionnaire    • Feeling of Stress : Not at all   Social Connections: Moderately Integrated (12/15/2021)    Social Connection and Isolation Panel [NHANES]    • Frequency of Communication with Friends and Family: More than three times a week    • Frequency of Social Gatherings with Friends and Family: More than three times a week    • Attends Shinto Services: More than 4 times per year    • Active Member of Clubs or Organizations: No    • Attends Club or Organization Meetings: Never    • Marital Status:    Intimate Partner Violence: Not At Risk (12/15/2021)    Humiliation, Afraid, Rape, and Kick questionnaire    • Fear of Current or Ex-Partner: No    • Emotionally Abused: No    • Physically Abused: No    • Sexually Abused: No   Housing Stability: Unknown (12/15/2021)    Housing Stability Vital Sign    • Unable to Pay for Housing in the Last Year: No    • Number of Places Lived in the Last Year: Not on file    • Unstable Housing in the Last Year: No      Family History:     Family History   Problem Relation Age of Onset   • Heart disease Mother    • Emphysema Mother    • COPD Father    • Heart disease Father    • No Known Problems Sister    • No Known Problems Sister    • No Known Problems Sister    • No Known Problems Sister    • No Known Problems Daughter    • No Known Problems Maternal Grandmother    • No Known Problems Maternal Grandfather    • No Known Problems Paternal Grandmother    • No Known Problems Paternal Grandfather    • No Known Problems Maternal Aunt    • No Known Problems Maternal Aunt    • No Known Problems Paternal Aunt        "Current Medications:     Current Outpatient Medications   Medication Sig Dispense Refill   • albuterol (PROVENTIL HFA,VENTOLIN HFA) 90 mcg/act inhaler      • atorvastatin (LIPITOR) 20 mg tablet TAKE 1 TABLET BY MOUTH EVERY DAY 90 tablet 0   • Lancets (OneTouch Delica Plus [id number]) MISC TEST BLOOD SUGAR 2 TIMES DAILY 100 each 3   • levothyroxine 112 mcg tablet Take 1 tablet (112 mcg total) by mouth daily in the early morning 90 tablet 0   • metFORMIN (GLUCOPHAGE) 500 mg tablet TAKE 1 TABLET (500 MG TOTAL) BY MOUTH DAILY. 30 tablet 2   • traZODone (DESYREL) 50 mg tablet Take 1 tablet (50 mg total) by mouth daily at bedtime 30 tablet 1     No current facility-administered medications for this visit.      Allergies:     No Known Allergies   Physical Exam:     /82 (BP Location: Left arm, Patient Position: Sitting, Cuff Size: Standard)   Pulse 73   Temp 98.5 °F (36.9 °C) (Tympanic)   Ht 5' 4\" (1.626 m)   Wt 77.2 kg (170 lb 3.2 oz)   SpO2 98%   BMI 29.21 kg/m²     Physical Exam     Shirley Almonte MD  [place] [facility]    "

## 2024-01-25 PROBLEM — Z78.0 POST-MENOPAUSE: Status: ACTIVE | Noted: 2024-01-25

## 2024-01-25 NOTE — ASSESSMENT & PLAN NOTE
Acute on chronic symptomatic no fall no trauma symptomatic of the head aggravate the pain plan for x-ray of the neck recommend Tylenol for the pain

## 2024-01-25 NOTE — ASSESSMENT & PLAN NOTE
Symptomatic not responding creatinine in the home remedy recommend started trazodone 50 mg once a day proper use and possible side effects reviewed   Primary Defect Length (In Cm): 1.4

## 2024-01-25 NOTE — ASSESSMENT & PLAN NOTE
Patient above age 49 postmenopausal vitamin D deficiency recommend screening for osteoporosis discussed with patient and she agree

## 2024-02-02 ENCOUNTER — OFFICE VISIT (OUTPATIENT)
Dept: GASTROENTEROLOGY | Facility: CLINIC | Age: 64
End: 2024-02-02
Payer: COMMERCIAL

## 2024-02-02 VITALS
DIASTOLIC BLOOD PRESSURE: 78 MMHG | TEMPERATURE: 98.3 F | BODY MASS INDEX: 28.89 KG/M2 | SYSTOLIC BLOOD PRESSURE: 140 MMHG | WEIGHT: 169.2 LBS | HEIGHT: 64 IN

## 2024-02-02 DIAGNOSIS — R68.81 EARLY SATIETY: ICD-10-CM

## 2024-02-02 DIAGNOSIS — R10.13 DYSPEPSIA: Primary | ICD-10-CM

## 2024-02-02 DIAGNOSIS — K20.90 ESOPHAGITIS: ICD-10-CM

## 2024-02-02 DIAGNOSIS — K29.50 CHRONIC GASTRITIS WITHOUT BLEEDING, UNSPECIFIED GASTRITIS TYPE: ICD-10-CM

## 2024-02-02 PROCEDURE — 99214 OFFICE O/P EST MOD 30 MIN: CPT | Performed by: INTERNAL MEDICINE

## 2024-02-02 RX ORDER — OMEPRAZOLE 40 MG/1
40 CAPSULE, DELAYED RELEASE ORAL DAILY
Qty: 30 CAPSULE | Refills: 5 | Status: SHIPPED | OUTPATIENT
Start: 2024-02-02 | End: 2024-07-31

## 2024-02-02 NOTE — PROGRESS NOTES
[facility] - Outpatient Follow-up Note  Esha Oh 63 y.o. female MRN: [id number]  Encounter: [id number]          ASSESSMENT AND PLAN:      1. Dyspepsia  2.  Esophagitis  3.  Gastritis  4.  Preventative health care    Differentials include uncontrolled acid reflux, gastroparesis, esophagitis and gastritis.  Patient currently not on PPI.  Pantoprazole did not help in the past.  Will prescribe patient omeprazole.  Will order gastric emptying study.  If omeprazole has not helped control symptoms in 1 month then patient should get gastric emptying study done.  Patient agreeable with plan.  Patient had hepatitis C screening test/hepatitis C antibody done in 2022 the report of which was reviewed by me today and found to be negative.    RTC 4 months.      Philipp Lucas MD  Gastroenterology  [facility]  Date: February 2, 2024    - omeprazole (PriLOSEC) 40 MG capsule; Take 1 capsule (40 mg total) by mouth daily  Dispense: 30 capsule; Refill: 5    ______________________________________________________________________    SUBJECTIVE: 63-year-old with diabetes, GERD, s/p cholecystectomy presents for follow-up.    During last office visit patient reported heartburn, abdominal pain and bloating which were not completely resolved with pantoprazole.  MRI in October 2022 showed pancreatic lesion as well as dilated common bile duct.  EGD and EUS were ordered.  EGD showed irregular Z-line and gastritis.  Gastric and duodenal biopsies were taken the path report of which was reviewed by me today and showed negative for any significant disease.  EUS showed no CBD filling defects or stones, borderline dilated primary Craddick duct and simple pancreatic cyst.    Patient currently reports that she continues to have abdominal pain, bloating as well as heartburn.  She tried pantoprazole but did not help much.  Normal bowel movements.      REVIEW OF SYSTEMS IS OTHERWISE  NEGATIVE.      Historical Information   Past Medical History:   Diagnosis Date    Anxiety 2019    Asthma     Cataract     viral    Chronic kidney disease     Class 1 obesity due to excess calories with serious comorbidity and body mass index (BMI) of 30.0 to 30.9 in adult 12/10/2018    Herpes zoster without complication 2020    Hyperlipidemia     Hypertension     Hypothyroid     IFG (impaired fasting glucose) 2019    Incisional hernia     Insomnia     Kidney stone     in past    Left upper quadrant pain 2019    Lower abdominal pain 2020    Osteoarthritis     RA (rheumatoid arthritis) ([facility]) 2015    pt unsure     Type 2 diabetes mellitus without complication, without long-term current use of insulin ([facility]) 2019    NIDDM    Uses Bulgarian as primary spoken language     Varicose veins of both lower extremities     Wears dentures      upper partials    Wears glasses      Past Surgical History:   Procedure Laterality Date    APPENDECTOMY      BREAST BIOPSY Right 1992    negative     SECTION      x 3    CHOLECYSTECTOMY      COLONOSCOPY      EYE SURGERY      HERNIA REPAIR      HYSTERECTOMY      TN LAP RPR HRNA XCPT INCAL/INGUN NCRC8/STRANGULATED N/A 2019    Procedure: REPAIR HERNIA INCISIONAL LAPAROSCOPIC W/ ROBOTICS;  Surgeon: Sadia Sanchez MD;  Location: [facility] OR;  Service: General    UPPER GASTROINTESTINAL ENDOSCOPY       Social History   Social History     Substance and Sexual Activity   Alcohol Use Yes    Comment: liquor     Social History     Substance and Sexual Activity   Drug Use No     Social History     Tobacco Use   Smoking Status Never    Passive exposure: Never   Smokeless Tobacco Never   Tobacco Comments    no passive smoke exposure     Family History   Problem Relation Age of Onset    Heart disease Mother     Emphysema Mother     COPD Father     Heart disease Father     No Known Problems Sister     No Known Problems Sister     No Known Problems Sister   "   No Known Problems Sister     No Known Problems Daughter     No Known Problems Maternal Grandmother     No Known Problems Maternal Grandfather     No Known Problems Paternal Grandmother     No Known Problems Paternal Grandfather     No Known Problems Maternal Aunt     No Known Problems Maternal Aunt     No Known Problems Paternal Aunt        Meds/Allergies       Current Outpatient Medications:     albuterol (PROVENTIL HFA,VENTOLIN HFA) 90 mcg/act inhaler    atorvastatin (LIPITOR) 20 mg tablet    Lancets (OneTouch Delica Plus [id number]) MISC    levothyroxine 112 mcg tablet    metFORMIN (GLUCOPHAGE) 500 mg tablet    omeprazole (PriLOSEC) 40 MG capsule    traZODone (DESYREL) 50 mg tablet    No Known Allergies        Objective     Blood pressure 140/78, temperature 98.3 °F (36.8 °C), height 5' 4\" (1.626 m), weight 76.7 kg (169 lb 3.2 oz), not currently breastfeeding. Body mass index is 29.04 kg/m².      PHYSICAL EXAM:      General Appearance:   Alert, cooperative, no distress   HEENT:   Normocephalic, atraumatic, anicteric.     Neck:  Supple, symmetrical, trachea midline   Lungs:   Clear to auscultation bilaterally; no rales, rhonchi or wheezing; respirations unlabored    Heart::   Regular rate and rhythm; no murmur, rub, or gallop.   Abdomen:   Soft, non-tender, non-distended; normal bowel sounds; no masses, no organomegaly    Genitalia:   Deferred    Rectal:   Deferred    Extremities:  No cyanosis, clubbing or edema    Pulses:  2+ and symmetric    Skin:  No jaundice, rashes, or lesions    Lymph nodes:  No palpable cervical lymphadenopathy        Lab Results:   No visits with results within 1 Day(s) from this visit.   Latest known visit with results is:   Appointment on 11/22/2023   Component Date Value    WBC 11/22/2023 6.80     RBC 11/22/2023 4.82     Hemoglobin 11/22/2023 14.2     Hematocrit 11/22/2023 41.9     MCV 11/22/2023 87     MCH 11/22/2023 29.5     MCHC 11/22/2023 33.9     RDW 11/22/2023 12.1     MPV " 11/22/2023 11.2     Platelets 11/22/2023 219     nRBC 11/22/2023 0     Neutrophils Relative 11/22/2023 58     Immat GRANS % 11/22/2023 0     Lymphocytes Relative 11/22/2023 34     Monocytes Relative 11/22/2023 5     Eosinophils Relative 11/22/2023 3     Basophils Relative 11/22/2023 0     Neutrophils Absolute 11/22/2023 3.92     Immature Grans Absolute 11/22/2023 0.01     Lymphocytes Absolute 11/22/2023 2.30     Monocytes Absolute 11/22/2023 0.36     Eosinophils Absolute 11/22/2023 0.19     Basophils Absolute 11/22/2023 0.02          Radiology Results:   No results found.

## 2024-02-19 ENCOUNTER — APPOINTMENT (OUTPATIENT)
Dept: LAB | Facility: HOSPITAL | Age: 64
End: 2024-02-19
Payer: COMMERCIAL

## 2024-02-19 DIAGNOSIS — Z78.0 POST-MENOPAUSE: ICD-10-CM

## 2024-02-19 DIAGNOSIS — Z13.220 SCREENING, LIPID: ICD-10-CM

## 2024-02-19 DIAGNOSIS — M54.2 NECK PAIN: ICD-10-CM

## 2024-02-19 DIAGNOSIS — E66.3 OVERWEIGHT (BMI 25.0-29.9): ICD-10-CM

## 2024-02-19 DIAGNOSIS — Z00.01 ENCOUNTER FOR WELL ADULT EXAM WITH ABNORMAL FINDINGS: ICD-10-CM

## 2024-02-19 DIAGNOSIS — Z13.29 SCREENING FOR THYROID DISORDER: ICD-10-CM

## 2024-02-19 DIAGNOSIS — G47.09 OTHER INSOMNIA: ICD-10-CM

## 2024-02-19 DIAGNOSIS — E11.9 TYPE 2 DIABETES MELLITUS WITHOUT COMPLICATION, WITHOUT LONG-TERM CURRENT USE OF INSULIN (HCC): ICD-10-CM

## 2024-02-19 LAB
ALBUMIN SERPL BCP-MCNC: 4.1 G/DL (ref 3.5–5)
ALP SERPL-CCNC: 87 U/L (ref 34–104)
ALT SERPL W P-5'-P-CCNC: 25 U/L (ref 7–52)
ANION GAP SERPL CALCULATED.3IONS-SCNC: 7 MMOL/L
AST SERPL W P-5'-P-CCNC: 21 U/L (ref 13–39)
BASOPHILS # BLD AUTO: 0.02 THOUSANDS/ÂΜL (ref 0–0.1)
BASOPHILS NFR BLD AUTO: 0 % (ref 0–1)
BILIRUB SERPL-MCNC: 0.63 MG/DL (ref 0.2–1)
BUN SERPL-MCNC: 12 MG/DL (ref 5–25)
CALCIUM SERPL-MCNC: 9.6 MG/DL (ref 8.4–10.2)
CHLORIDE SERPL-SCNC: 106 MMOL/L (ref 96–108)
CHOLEST SERPL-MCNC: 131 MG/DL
CO2 SERPL-SCNC: 27 MMOL/L (ref 21–32)
CREAT SERPL-MCNC: 0.72 MG/DL (ref 0.6–1.3)
CREAT UR-MCNC: 99.5 MG/DL
EOSINOPHIL # BLD AUTO: 0.19 THOUSAND/ÂΜL (ref 0–0.61)
EOSINOPHIL NFR BLD AUTO: 3 % (ref 0–6)
ERYTHROCYTE [DISTWIDTH] IN BLOOD BY AUTOMATED COUNT: 12 % (ref 11.6–15.1)
EST. AVERAGE GLUCOSE BLD GHB EST-MCNC: 154 MG/DL
GFR SERPL CREATININE-BSD FRML MDRD: 88 ML/MIN/1.73SQ M
GLUCOSE P FAST SERPL-MCNC: 142 MG/DL (ref 65–99)
HBA1C MFR BLD: 7 %
HCT VFR BLD AUTO: 43 % (ref 34.8–46.1)
HDLC SERPL-MCNC: 50 MG/DL
HGB BLD-MCNC: 14.4 G/DL (ref 11.5–15.4)
IMM GRANULOCYTES # BLD AUTO: 0.01 THOUSAND/UL (ref 0–0.2)
IMM GRANULOCYTES NFR BLD AUTO: 0 % (ref 0–2)
LDLC SERPL CALC-MCNC: 62 MG/DL (ref 0–100)
LYMPHOCYTES # BLD AUTO: 2.2 THOUSANDS/ÂΜL (ref 0.6–4.47)
LYMPHOCYTES NFR BLD AUTO: 36 % (ref 14–44)
MCH RBC QN AUTO: 29.1 PG (ref 26.8–34.3)
MCHC RBC AUTO-ENTMCNC: 33.5 G/DL (ref 31.4–37.4)
MCV RBC AUTO: 87 FL (ref 82–98)
MICROALBUMIN UR-MCNC: <7 MG/L
MICROALBUMIN/CREAT 24H UR: <7 MG/G CREATININE (ref 0–30)
MONOCYTES # BLD AUTO: 0.42 THOUSAND/ÂΜL (ref 0.17–1.22)
MONOCYTES NFR BLD AUTO: 7 % (ref 4–12)
NEUTROPHILS # BLD AUTO: 3.29 THOUSANDS/ÂΜL (ref 1.85–7.62)
NEUTS SEG NFR BLD AUTO: 54 % (ref 43–75)
NRBC BLD AUTO-RTO: 0 /100 WBCS
PLATELET # BLD AUTO: 213 THOUSANDS/UL (ref 149–390)
PMV BLD AUTO: 10.9 FL (ref 8.9–12.7)
POTASSIUM SERPL-SCNC: 3.9 MMOL/L (ref 3.5–5.3)
PROT SERPL-MCNC: 7.1 G/DL (ref 6.4–8.4)
RBC # BLD AUTO: 4.95 MILLION/UL (ref 3.81–5.12)
SODIUM SERPL-SCNC: 140 MMOL/L (ref 135–147)
TRIGL SERPL-MCNC: 96 MG/DL
TSH SERPL DL<=0.05 MIU/L-ACNC: 0.68 UIU/ML (ref 0.45–4.5)
WBC # BLD AUTO: 6.13 THOUSAND/UL (ref 4.31–10.16)

## 2024-02-19 PROCEDURE — 82043 UR ALBUMIN QUANTITATIVE: CPT

## 2024-02-19 PROCEDURE — 80053 COMPREHEN METABOLIC PANEL: CPT

## 2024-02-19 PROCEDURE — 84443 ASSAY THYROID STIM HORMONE: CPT

## 2024-02-19 PROCEDURE — 36415 COLL VENOUS BLD VENIPUNCTURE: CPT

## 2024-02-19 PROCEDURE — 85025 COMPLETE CBC W/AUTO DIFF WBC: CPT

## 2024-02-19 PROCEDURE — 80061 LIPID PANEL: CPT

## 2024-02-19 PROCEDURE — 83036 HEMOGLOBIN GLYCOSYLATED A1C: CPT

## 2024-02-19 PROCEDURE — 82570 ASSAY OF URINE CREATININE: CPT

## 2024-02-22 ENCOUNTER — HOSPITAL ENCOUNTER (OUTPATIENT)
Dept: MAMMOGRAPHY | Facility: CLINIC | Age: 64
End: 2024-02-22
Payer: COMMERCIAL

## 2024-02-22 VITALS — HEIGHT: 64 IN | WEIGHT: 169 LBS | BODY MASS INDEX: 28.85 KG/M2

## 2024-02-22 DIAGNOSIS — R92.8 ABNORMAL MAMMOGRAM OF LEFT BREAST: ICD-10-CM

## 2024-02-22 PROCEDURE — 76642 ULTRASOUND BREAST LIMITED: CPT

## 2024-02-22 PROCEDURE — G0279 TOMOSYNTHESIS, MAMMO: HCPCS

## 2024-02-22 PROCEDURE — 77066 DX MAMMO INCL CAD BI: CPT

## 2024-02-23 ENCOUNTER — OFFICE VISIT (OUTPATIENT)
Dept: FAMILY MEDICINE CLINIC | Facility: CLINIC | Age: 64
End: 2024-02-23
Payer: COMMERCIAL

## 2024-02-23 VITALS
OXYGEN SATURATION: 98 % | SYSTOLIC BLOOD PRESSURE: 130 MMHG | HEIGHT: 64 IN | TEMPERATURE: 98.2 F | DIASTOLIC BLOOD PRESSURE: 70 MMHG | WEIGHT: 169.4 LBS | HEART RATE: 72 BPM | BODY MASS INDEX: 28.92 KG/M2

## 2024-02-23 DIAGNOSIS — R92.8 ABNORMAL MAMMOGRAM: ICD-10-CM

## 2024-02-23 DIAGNOSIS — I10 ESSENTIAL HYPERTENSION: ICD-10-CM

## 2024-02-23 DIAGNOSIS — E78.2 MIXED HYPERLIPIDEMIA: ICD-10-CM

## 2024-02-23 DIAGNOSIS — E03.9 ACQUIRED HYPOTHYROIDISM: ICD-10-CM

## 2024-02-23 DIAGNOSIS — E11.9 TYPE 2 DIABETES MELLITUS WITHOUT COMPLICATION, WITHOUT LONG-TERM CURRENT USE OF INSULIN (HCC): Primary | ICD-10-CM

## 2024-02-23 PROCEDURE — 99214 OFFICE O/P EST MOD 30 MIN: CPT | Performed by: FAMILY MEDICINE

## 2024-02-23 RX ORDER — METFORMIN HYDROCHLORIDE 500 MG/1
500 TABLET, EXTENDED RELEASE ORAL
Qty: 90 TABLET | Refills: 1 | Status: SHIPPED | OUTPATIENT
Start: 2024-02-23

## 2024-02-23 NOTE — PROGRESS NOTES
Name: Esha Oh      : 1960      MRN: [id number]  Encounter Provider: Shirley Almonte MD  Encounter Date: 2024   Encounter department: [facility]    Assessment & Plan     1. Type 2 diabetes mellitus without complication, without long-term current use of insulin (HCC)  Assessment & Plan:    Lab Results   Component Value Date    HGBA1C 7.0 (H) 2024   Chronic asymptomatic hemoglobin A1c stable recommend to stop metformin immediate release 500 mg switch to metformin extended release 500 mg once a day low-carb diet important lose weight reviewed patient already on statin    Orders:  -     glucose blood test strip; Use as instructed to test blood sugar once a day  -     metFORMIN (GLUCOPHAGE-XR) 500 mg 24 hr tablet; Take 1 tablet (500 mg total) by mouth daily with dinner  -     CBC and differential; Future  -     Comprehensive metabolic panel; Future  -     Hemoglobin A1C; Future    2. Acquired hypothyroidism  Assessment & Plan:  Chronic asymptomatic fair control continue with the levothyroxine 112 mcg once a day    Orders:  -     CBC and differential; Future  -     Comprehensive metabolic panel; Future  -     TSH, 3rd generation with Free T4 reflex; Future    3. Mixed hyperlipidemia  Assessment & Plan:  Chronic asymptomatic fair control LDL therapeutic and diabetes patient continue with atorvastatin 20 mg once a day    Orders:  -     CBC and differential; Future  -     Comprehensive metabolic panel; Future  -     Lipid Panel with Direct LDL reflex; Future    4. Abnormal mammogram  Assessment & Plan:  Abnormal mammogram of the right breast complex cysts at the 6:00 recommend to repeat the diagnostic mammogram next [facility] in 1 year discussed with the patient and she agree    Orders:  -     Mammo screening left w 3d & cad; Future; Expected date: 2025  -     Mammo diagnostic right w cad; Future; Expected date: 2025  -     US breast right limited  "(diagnostic); Future; Expected date: 02/23/2025    5. Essential hypertension  -     CBC and differential; Future  -     Comprehensive metabolic panel; Future           Subjective      Patient here follow-up with a chronic condition patient compliant with the medication tolerated well without side effect no new concerns recent blood work discussed with the patient      Review of Systems   Constitutional:  Negative for chills and fever.   HENT:  Negative for ear pain and sore throat.    Eyes:  Negative for pain and visual disturbance.   Respiratory:  Negative for cough and shortness of breath.    Cardiovascular:  Negative for chest pain and palpitations.   Gastrointestinal:  Negative for abdominal pain, blood in stool, constipation, diarrhea and vomiting.   Genitourinary:  Negative for dysuria and hematuria.   Musculoskeletal:  Negative for arthralgias and back pain.   Skin:  Negative for color change and rash.   Neurological:  Negative for seizures and syncope.   All other systems reviewed and are negative.      Current Outpatient Medications on File Prior to Visit   Medication Sig   • albuterol (PROVENTIL HFA,VENTOLIN HFA) 90 mcg/act inhaler    • atorvastatin (LIPITOR) 20 mg tablet TAKE 1 TABLET BY MOUTH EVERY DAY   • Lancets (OneTouch Delica Plus [id number]) MISC TEST BLOOD SUGAR 2 TIMES DAILY   • levothyroxine 112 mcg tablet Take 1 tablet (112 mcg total) by mouth daily in the early morning   • omeprazole (PriLOSEC) 40 MG capsule Take 1 capsule (40 mg total) by mouth daily   • traZODone (DESYREL) 50 mg tablet Take 1 tablet (50 mg total) by mouth daily at bedtime   • [DISCONTINUED] metFORMIN (GLUCOPHAGE) 500 mg tablet TAKE 1 TABLET (500 MG TOTAL) BY MOUTH DAILY.       Objective     /70 (BP Location: Left arm, Patient Position: Sitting)   Pulse 72   Temp 98.2 °F (36.8 °C) (Tympanic)   Ht 5' 4\" (1.626 m)   Wt 76.8 kg (169 lb 6.4 oz)   SpO2 98%   BMI 29.08 kg/m²     Physical Exam  Vitals and nursing note " reviewed.   Constitutional:       General: She is not in acute distress.     Appearance: She is not diaphoretic.   HENT:      Head: Normocephalic and atraumatic.      Right Ear: Tympanic membrane normal. There is no impacted cerumen.      Left Ear: Tympanic membrane normal. There is no impacted cerumen.      Nose: Nose normal. No congestion or rhinorrhea.      Mouth/Throat:      Pharynx: No posterior oropharyngeal erythema.   Eyes:      General: No scleral icterus.        Right eye: No discharge.         Left eye: No discharge.   Cardiovascular:      Rate and Rhythm: Normal rate and regular rhythm.      Pulses: no weak pulses.           Dorsalis pedis pulses are 2+ on the right side and 2+ on the left side.      Heart sounds: No murmur heard.  Pulmonary:      Effort: Pulmonary effort is normal. No respiratory distress.   Abdominal:      General: There is no distension.      Tenderness: There is no abdominal tenderness.   Musculoskeletal:         General: Normal range of motion.      Cervical back: Normal range of motion.      Right lower leg: No edema.      Left lower leg: No edema.   Feet:      Right foot:      Skin integrity: No warmth.      Left foot:      Skin integrity: No warmth.   Skin:     Findings: No rash.   Neurological:      Mental Status: She is alert and oriented to person, place, and time.     Patient's shoes and socks removed.    Right Foot/Ankle   Right Foot Inspection  Skin Exam: skin intact. No warmth and no pre-ulcer.     Toe Exam: No swelling and erythema    Sensory   Monofilament testing: intact    Vascular  Capillary refills: < 3 seconds  The right DP pulse is 2+.     Left Foot/Ankle  Left Foot Inspection  Skin Exam: skin intact. No warmth and no pre-ulcer.     Toe Exam: No swelling and no erythema.     Sensory   Monofilament testing: intact    Vascular  Capillary refills: < 3 seconds  The left DP pulse is 2+.     Assign Risk Category  No deformity present  No loss of protective sensation  No  weak pulses  Risk: 0     Shirley Almonte MD

## 2024-02-23 NOTE — ASSESSMENT & PLAN NOTE
Lab Results   Component Value Date    HGBA1C 7.0 (H) 02/19/2024   Chronic asymptomatic hemoglobin A1c stable recommend to stop metformin immediate release 500 mg switch to metformin extended release 500 mg once a day low-carb diet important lose weight reviewed patient already on statin

## 2024-02-23 NOTE — ASSESSMENT & PLAN NOTE
Chronic asymptomatic fair control LDL therapeutic and diabetes patient continue with atorvastatin 20 mg once a day

## 2024-02-23 NOTE — ASSESSMENT & PLAN NOTE
Abnormal mammogram of the right breast complex cysts at the 6:00 recommend to repeat the diagnostic mammogram next [facility] in 1 year discussed with the patient and she agree

## 2024-03-14 DIAGNOSIS — G47.09 OTHER INSOMNIA: ICD-10-CM

## 2024-03-14 RX ORDER — TRAZODONE HYDROCHLORIDE 50 MG/1
50 TABLET ORAL
Qty: 90 TABLET | Refills: 1 | Status: SHIPPED | OUTPATIENT
Start: 2024-03-14

## 2024-03-22 ENCOUNTER — OFFICE VISIT (OUTPATIENT)
Dept: PHYSICAL THERAPY | Facility: CLINIC | Age: 64
End: 2024-03-22
Payer: COMMERCIAL

## 2024-03-22 ENCOUNTER — OFFICE VISIT (OUTPATIENT)
Dept: FAMILY MEDICINE CLINIC | Facility: CLINIC | Age: 64
End: 2024-03-22
Payer: COMMERCIAL

## 2024-03-22 VITALS
OXYGEN SATURATION: 98 % | HEIGHT: 64 IN | WEIGHT: 168 LBS | TEMPERATURE: 97.9 F | BODY MASS INDEX: 28.68 KG/M2 | HEART RATE: 73 BPM | DIASTOLIC BLOOD PRESSURE: 74 MMHG | SYSTOLIC BLOOD PRESSURE: 120 MMHG

## 2024-03-22 DIAGNOSIS — M75.81 ROTATOR CUFF TENDINITIS, RIGHT: Primary | ICD-10-CM

## 2024-03-22 DIAGNOSIS — G89.29 CHRONIC BILATERAL LOW BACK PAIN WITH BILATERAL SCIATICA: ICD-10-CM

## 2024-03-22 DIAGNOSIS — M54.2 DISCOGENIC CERVICAL PAIN: Primary | ICD-10-CM

## 2024-03-22 DIAGNOSIS — M54.41 CHRONIC BILATERAL LOW BACK PAIN WITH BILATERAL SCIATICA: ICD-10-CM

## 2024-03-22 DIAGNOSIS — M54.42 CHRONIC BILATERAL LOW BACK PAIN WITH BILATERAL SCIATICA: ICD-10-CM

## 2024-03-22 PROBLEM — M50.30 DISCOGENIC CERVICAL PAIN: Status: ACTIVE | Noted: 2023-11-14

## 2024-03-22 PROCEDURE — 97110 THERAPEUTIC EXERCISES: CPT | Performed by: PHYSICAL THERAPIST

## 2024-03-22 PROCEDURE — 99214 OFFICE O/P EST MOD 30 MIN: CPT | Performed by: FAMILY MEDICINE

## 2024-03-22 PROCEDURE — 97161 PT EVAL LOW COMPLEX 20 MIN: CPT | Performed by: PHYSICAL THERAPIST

## 2024-03-22 RX ORDER — GABAPENTIN 100 MG/1
100 CAPSULE ORAL 3 TIMES DAILY
Qty: 90 CAPSULE | Refills: 1 | Status: SHIPPED | OUTPATIENT
Start: 2024-03-22

## 2024-03-22 NOTE — ASSESSMENT & PLAN NOTE
New diagnosis of chronic pain no recent fall or trauma to start the patient on gabapentin 100 up to 3 times a day proper use and possible side effects reviewed plan for x-ray of the lumbar spine

## 2024-03-22 NOTE — ASSESSMENT & PLAN NOTE
Symptomatic status post urgent care in March 17, 2024 current medication is not helping review x-ray discogenic disease at multiple levels recommend start gabapentin 100 mg proper titration discussed with the patient patient will go to physical therapy refer patient to spine management

## 2024-03-22 NOTE — PROGRESS NOTES
Name: Esha Oh      : 1960      MRN: [id number]  Encounter Provider: Shirley Almonte MD  Encounter Date: 3/22/2024   Encounter department: [facility]    Assessment & Plan     1. Discogenic cervical pain  Assessment & Plan:  Symptomatic status post urgent care in 2024 current medication is not helping review x-ray discogenic disease at multiple levels recommend start gabapentin 100 mg proper titration discussed with the patient patient will go to physical therapy refer patient to spine management    Orders:  -     gabapentin (NEURONTIN) 100 mg capsule; Take 1 capsule (100 mg total) by mouth 3 (three) times a day  -     Ambulatory Referral to [facility]; Future    2. Chronic bilateral low back pain with bilateral sciatica  Assessment & Plan:  New diagnosis of chronic pain no recent fall or trauma to start the patient on gabapentin 100 up to 3 times a day proper use and possible side effects reviewed plan for x-ray of the lumbar spine    Orders:  -     XR spine lumbar minimum 4 views non injury; Future; Expected date: 2024         Subjective      Patient with a concern about back pain.  She has been on her upper back, there are radiated to her right she has numbness and tingling sensation in her right when she turns her head to make reduction aggravate the pain patient also has urgent care recently and the x-ray of the cervical spine has been done workup reviewed show discogenic disease at the cervical spine multiple level also today she concerned about low back pain and the pain radiated to her lower extremity no drop in the foot no recent fall or trauma increased with the cough and no numbness or muscle weakness no loss control of urine or stool      Review of Systems   Constitutional:  Negative for chills and fever.   HENT:  Negative for ear pain and sore throat.    Eyes:  Negative for pain and visual disturbance.   Respiratory:   "Negative for cough and shortness of breath.    Cardiovascular:  Negative for chest pain and palpitations.   Gastrointestinal:  Negative for abdominal pain, constipation, diarrhea and vomiting.   Genitourinary:  Negative for dysuria and hematuria.   Musculoskeletal:  Positive for back pain and neck pain.   Skin:  Negative for color change and rash.   Neurological:  Negative for seizures and syncope.   All other systems reviewed and are negative.      Current Outpatient Medications on File Prior to Visit   Medication Sig   • albuterol (PROVENTIL HFA,VENTOLIN HFA) 90 mcg/act inhaler    • atorvastatin (LIPITOR) 20 mg tablet TAKE 1 TABLET BY MOUTH EVERY DAY   • glucose blood test strip Use as instructed to test blood sugar once a day   • Lancets (OneTouch Delica Plus [id number]) MISC TEST BLOOD SUGAR 2 TIMES DAILY   • levothyroxine 112 mcg tablet Take 1 tablet (112 mcg total) by mouth daily in the early morning   • metFORMIN (GLUCOPHAGE-XR) 500 mg 24 hr tablet Take 1 tablet (500 mg total) by mouth daily with dinner   • omeprazole (PriLOSEC) 40 MG capsule Take 1 capsule (40 mg total) by mouth daily   • traZODone (DESYREL) 50 mg tablet TAKE 1 TABLET BY MOUTH DAILY AT BEDTIME       Objective     /74 (BP Location: Left arm, Patient Position: Sitting, Cuff Size: Standard)   Pulse 73   Temp 97.9 °F (36.6 °C) (Tympanic)   Ht 5' 4\" (1.626 m)   Wt 76.2 kg (168 lb)   SpO2 98%   BMI 28.84 kg/m²     Physical Exam  Vitals and nursing note reviewed.   Constitutional:       General: She is not in acute distress.     Appearance: She is not diaphoretic.   HENT:      Head: Normocephalic and atraumatic.      Right Ear: Tympanic membrane normal. There is no impacted cerumen.      Left Ear: Tympanic membrane normal. There is no impacted cerumen.      Nose: Nose normal. No congestion or rhinorrhea.      Mouth/Throat:      Pharynx: No posterior oropharyngeal erythema.   Eyes:      General: No scleral icterus.        Right eye: No " discharge.         Left eye: No discharge.   Cardiovascular:      Rate and Rhythm: Normal rate and regular rhythm.      Heart sounds: No murmur heard.  Pulmonary:      Effort: Pulmonary effort is normal. No respiratory distress.   Abdominal:      General: There is no distension.      Tenderness: There is no abdominal tenderness.   Musculoskeletal:         General: Normal range of motion.      Cervical back: Normal range of motion. Tenderness present. No signs of trauma or bony tenderness.      Lumbar back: Tenderness present. No signs of trauma or bony tenderness. Normal range of motion. Negative right straight leg raise test and negative left straight leg raise test.      Right lower leg: No edema.      Left lower leg: No edema.   Skin:     Findings: No rash.   Neurological:      Mental Status: She is alert and oriented to person, place, and time.       Shirley Almonte MD

## 2024-03-22 NOTE — PROGRESS NOTES
PT Evaluation     Today's date: 3/22/2024  Patient name: Esha Oh  : 1960  MRN: [id number]  Referring provider: Shirley Almonte MD  Dx:   Encounter Diagnosis     ICD-10-CM    1. Rotator cuff tendinitis, right  M75.81                      Assessment  Assessment details: Pt is a 63 y.o. year old female presenting to physical therapy via direct access for Rotator cuff tendinitis, right.  She presents with the following impairments: R shoulder weakness, TTP along R RTC and bicipital groove, right UT stiffness and TTP, (+) R RTC tendinopathy tests, and (-) cervical radiculopathy special tests with normal c/s AROM indicating likely RTC tendinopathy and affecting her function with reaching overhead, sitting, sleeping on her side, carrying, lifting, and completing household chores.  Pt will benefit from skilled physical therapy to address functional limitations noted in evaluation and meet patient goals.  Impairments: abnormal or restricted ROM, abnormal movement, activity intolerance, impaired physical strength, lacks appropriate home exercise program, pain with function, poor posture  and poor body mechanics    Symptom irritability: moderate  Goals  ST. Pt will reduce pain to 3/10.  2. Pt will improve R shoulder ER to 4+/5.    LT. Pt will improve R shoulder ER to 5/5 for improved lifting.  2. Pt will meet projected FOTO score.    Plan  Patient would benefit from: PT eval and skilled physical therapy  Planned modality interventions: cryotherapy, electrical stimulation/Russian stimulation, TENS and thermotherapy: hydrocollator packs  Planned therapy interventions: joint mobilization, abdominal trunk stabilization, manual therapy, body mechanics training, neuromuscular re-education, patient education, strengthening, stretching, therapeutic activities, therapeutic exercise, flexibility, functional ROM exercises and home exercise program  Frequency: 2x week  Duration in weeks: 6        Subjective  Evaluation    History of Present Illness  Mechanism of injury: Pt reports onset of neck and R shoulder pain for 1 week with no injury or CHELA.  Denies numbness and tingling, but has pain down her whole right arm.  She takes muscle relaxers with little change in pain. She has difficulty sleeping and has pain sometimes just sitting or lifting her arm up.  She is retired and does not work.          Not a recurrent problem   Pain  Current pain ratin          Objective     Palpation   Left   No palpable tenderness to the levator scapulae and upper trapezius.     Right   Hypertonic in the levator scapulae and upper trapezius.   Tenderness of the levator scapulae and upper trapezius.   Trigger point to levator scapulae and upper trapezius.     Tenderness     Right Shoulder  Tenderness in the bicipital groove, infraspinatus tendon and supraspinatus tendon. No tenderness in the subscapularis tendon.     Active Range of Motion   Cervical/Thoracic Spine       Cervical    Flexion:  with pain Restriction level: minimal  Extension:  Restriction level: minimal  Left lateral flexion:  WFL  Left rotation:  Restriction level: minimal  Right rotation:  WFL  Left Shoulder   Flexion: WFL  Abduction: WFL  External rotation 90°: 100 degrees   Internal rotation 90°: 50 degrees     Right Shoulder   Flexion: WFL  Abduction: WFL  External rotation 90°: 80 degreeswith pain  Internal rotation 90°: 50 degrees     Joint Play   Joints within functional limits: C1, C4, C5, C6 and C7     Hypomobile: C2, C3, T1, T2, T3, T4, T5, T6, T7 and T8     Pain: C2 and C3     Strength/Myotome Testing     Left Shoulder     Planes of Motion   Abduction: 3+   External rotation at 0°: 4-   Internal rotation at 0°: 4+     Right Shoulder     Planes of Motion   Abduction: 4-   External rotation at 0°: 4-   Internal rotation at 0°: 4     Left Elbow   Flexion: 4+  Extension: 4+    Right Elbow   Flexion: 4+  Extension: 4+    Left Wrist/Hand   Wrist extension:  "4+  Wrist flexion: 4+  Thumb extension: 5    Right Wrist/Hand   Wrist extension: 4+  Wrist flexion: 4+  Thumb extension: 5    Tests   Cervical   Negative cervical distraction.     Left   Negative Spurling's Test A.     Right   Negative Spurling's Test A.     Left Shoulder   Negative ULTT1.     Right Shoulder   Positive empty can, Hawkin's and painful arc.   Negative drop arm and ULTT1.              Precautions: R RTC tendinopathy, Swedish speaking    Date 3/22            Visit # IE            FOTO IE             Re-eval IE              Manuals 3/22            R shoulder PROM SF                                                   Neuro Re-Ed             Scap retractions 5x10\"            No Money 2x10 GTB            Pushup Plus             Rhythmic stabs             Scap clocks                                       Ther Ex             Pulleys             UBE             Rows/ext 20x GTB            ER/IR             UT str 3x10\"                                                   Ther Activity                                       Gait Training                                       Modalities                                            "

## 2024-03-29 ENCOUNTER — OFFICE VISIT (OUTPATIENT)
Dept: PHYSICAL THERAPY | Facility: CLINIC | Age: 64
End: 2024-03-29
Payer: COMMERCIAL

## 2024-03-29 DIAGNOSIS — M75.81 ROTATOR CUFF TENDINITIS, RIGHT: Primary | ICD-10-CM

## 2024-03-29 PROCEDURE — 97140 MANUAL THERAPY 1/> REGIONS: CPT | Performed by: PHYSICAL THERAPIST

## 2024-03-29 PROCEDURE — 97110 THERAPEUTIC EXERCISES: CPT | Performed by: PHYSICAL THERAPIST

## 2024-03-29 NOTE — PROGRESS NOTES
"Daily Note     Today's date: 3/29/2024  Patient name: Esha Oh  : 1960  MRN: [id number]  Referring provider: Shirley Almonte MD  Dx:   Encounter Diagnosis     ICD-10-CM    1. Rotator cuff tendinitis, right  M75.81                      Subjective: Pt reports feeling okay today.      Objective: See treatment diary below      Assessment:  Pt does well w today's session and is challenged w progression of shoulder ER and rows. Patient demonstrated fatigue post treatment and would benefit from continued PT.      Plan: Continue per plan of care.  Progress treatment as tolerated.       Precautions: R RTC tendinopathy, Italian speaking    Date 3/22 3/29           Visit # IE 2           FOTO IE             Re-eval IE              Manuals 3/22 3/29           R shoulder PROM SF SF                                                  Neuro Re-Ed  3/29           Scap retractions 5x10\" 5x10\"           No Money 2x10 GTB 2x10 GTB           Pushup Plus             Rhythmic stabs             Scap clocks  15x PTB                                     Ther Ex             Pulleys  3'           UBE             Rows/ext 20x GTB 3x10 14#           ER/IR  20x ER 2.5#           UT str 3x10\"                                                   Ther Activity                                       Gait Training                                       Modalities                                            "

## 2024-04-04 DIAGNOSIS — E78.2 MIXED HYPERLIPIDEMIA: ICD-10-CM

## 2024-04-04 RX ORDER — ATORVASTATIN CALCIUM 20 MG/1
TABLET, FILM COATED ORAL
Qty: 90 TABLET | Refills: 1 | Status: SHIPPED | OUTPATIENT
Start: 2024-04-04

## 2024-04-05 ENCOUNTER — OFFICE VISIT (OUTPATIENT)
Dept: PHYSICAL THERAPY | Facility: CLINIC | Age: 64
End: 2024-04-05
Payer: COMMERCIAL

## 2024-04-05 DIAGNOSIS — M75.81 ROTATOR CUFF TENDINITIS, RIGHT: Primary | ICD-10-CM

## 2024-04-05 PROCEDURE — 97140 MANUAL THERAPY 1/> REGIONS: CPT

## 2024-04-05 PROCEDURE — 97112 NEUROMUSCULAR REEDUCATION: CPT

## 2024-04-05 PROCEDURE — 97110 THERAPEUTIC EXERCISES: CPT

## 2024-04-05 NOTE — PROGRESS NOTES
"Daily Note     Today's date: 2024  Patient name: Esha Oh  : 1960  MRN: [id number]  Referring provider: Shirley Almonte MD  Dx:   Encounter Diagnosis     ICD-10-CM    1. Rotator cuff tendinitis, right  M75.81                      Subjective: Pt presents to PT reporting pain is in B upper back denying pain in R shoulder.  She stats the pain began in the R shoulder.  She states her upper back feels looser and less pain.      Objective: See treatment diary below      Assessment: Pt demonstrates good tolerance to progressions.  Pt demonstrates improvement with mobility while performing TE.  Pt requires cuing for proper posture; pt demosntrates an understanding by performing correctly in the clinic. Added STM to lower CS/upper TS secondary to complaints.  Noted muscles restrictions, L>R.  Pt reports positive response.  Patient demonstrated fatigue post treatment, exhibited good technique with therapeutic exercises, and would benefit from continued PT to increase flexibility, strength and function.      Plan: Continue per plan of care.      Precautions: R RTC tendinopathy, Chinese speaking    Date 3/22 3/29 4/5          Visit # IE 2 3          FOTO IE             Re-eval IE              Manuals 3/22 3/29 4/5          R shoulder PROM SF SF           STM lower CS/ upper TS    PK                                    Neuro Re-Ed  3/29 4/5          Scap retractions 5x10\" 5x10\" 5\" x 15          No Money 2x10 GTB 2x10 GTB 2x10 GTB          Pushup Plus             Rhythmic stabs             Scap clocks  15x PTB 15x PTB                                    Ther Ex   4/5          Pulleys  3' 5'          UBE             Rows/ext 20x GTB 3x10 14# 3x10 14#          ER/IR  20x ER 2.5# 20x ER 2.5#          UT str 3x10\"  20\" x 3                                                 Ther Activity   4/5                                    Gait Training   4/5                                    Modalities                        "

## 2024-04-11 ENCOUNTER — HOSPITAL ENCOUNTER (OUTPATIENT)
Dept: RADIOLOGY | Facility: HOSPITAL | Age: 64
End: 2024-04-11
Attending: INTERNAL MEDICINE
Payer: COMMERCIAL

## 2024-04-11 DIAGNOSIS — K29.50 CHRONIC GASTRITIS WITHOUT BLEEDING, UNSPECIFIED GASTRITIS TYPE: ICD-10-CM

## 2024-04-11 DIAGNOSIS — K20.90 ESOPHAGITIS: ICD-10-CM

## 2024-04-11 DIAGNOSIS — R10.13 DYSPEPSIA: ICD-10-CM

## 2024-04-11 DIAGNOSIS — R68.81 EARLY SATIETY: ICD-10-CM

## 2024-04-11 PROCEDURE — 78264 GASTRIC EMPTYING IMG STUDY: CPT

## 2024-04-11 PROCEDURE — G1004 CDSM NDSC: HCPCS

## 2024-04-11 PROCEDURE — A9541 TC99M SULFUR COLLOID: HCPCS

## 2024-04-12 ENCOUNTER — APPOINTMENT (OUTPATIENT)
Dept: PHYSICAL THERAPY | Facility: CLINIC | Age: 64
End: 2024-04-12
Payer: COMMERCIAL

## 2024-04-12 NOTE — RESULT ENCOUNTER NOTE
Dear staff: Please kindly let patient know that her gastric emptying study was completely normal, continue omeprazole and please do not hesitate to contact me with any questions or concerns.  Thank you.

## 2024-04-19 ENCOUNTER — APPOINTMENT (OUTPATIENT)
Dept: PHYSICAL THERAPY | Facility: CLINIC | Age: 64
End: 2024-04-19
Payer: COMMERCIAL

## 2024-04-23 ENCOUNTER — TELEPHONE (OUTPATIENT)
Dept: GASTROENTEROLOGY | Facility: CLINIC | Age: 64
End: 2024-04-23

## 2024-04-23 NOTE — TELEPHONE ENCOUNTER
I called the patient and lvm for them to call back and re-schedule appointment with Dr. Lucas for 6/7 since he will be covering the hospital rounds.

## 2024-04-30 DIAGNOSIS — E03.9 ACQUIRED HYPOTHYROIDISM: ICD-10-CM

## 2024-05-01 RX ORDER — LEVOTHYROXINE SODIUM 112 UG/1
112 TABLET ORAL
Qty: 90 TABLET | Refills: 1 | Status: SHIPPED | OUTPATIENT
Start: 2024-05-01

## 2024-05-10 ENCOUNTER — HOSPITAL ENCOUNTER (OUTPATIENT)
Dept: BONE DENSITY | Facility: CLINIC | Age: 64
End: 2024-05-10
Payer: COMMERCIAL

## 2024-05-10 DIAGNOSIS — Z78.0 POST-MENOPAUSE: ICD-10-CM

## 2024-05-10 DIAGNOSIS — Z13.820 SCREENING FOR OSTEOPOROSIS: ICD-10-CM

## 2024-05-10 PROCEDURE — 77080 DXA BONE DENSITY AXIAL: CPT

## 2024-05-29 ENCOUNTER — TELEPHONE (OUTPATIENT)
Dept: GASTROENTEROLOGY | Facility: CLINIC | Age: 64
End: 2024-05-29

## 2024-05-29 NOTE — TELEPHONE ENCOUNTER
I called and lvm for the patient in regards to re-scheduling appointment with Dr. Lucas on 10/2 due to his schedule changing.

## 2024-06-10 DIAGNOSIS — K29.50 CHRONIC GASTRITIS WITHOUT BLEEDING, UNSPECIFIED GASTRITIS TYPE: ICD-10-CM

## 2024-06-10 DIAGNOSIS — R10.13 DYSPEPSIA: ICD-10-CM

## 2024-06-10 DIAGNOSIS — K20.90 ESOPHAGITIS: ICD-10-CM

## 2024-06-10 RX ORDER — OMEPRAZOLE 40 MG/1
40 CAPSULE, DELAYED RELEASE ORAL DAILY
Qty: 90 CAPSULE | Refills: 1 | Status: SHIPPED | OUTPATIENT
Start: 2024-06-10 | End: 2024-12-07

## 2024-06-21 ENCOUNTER — APPOINTMENT (OUTPATIENT)
Dept: LAB | Facility: HOSPITAL | Age: 64
End: 2024-06-21
Payer: COMMERCIAL

## 2024-06-21 DIAGNOSIS — I10 ESSENTIAL HYPERTENSION: ICD-10-CM

## 2024-06-21 DIAGNOSIS — E78.2 MIXED HYPERLIPIDEMIA: ICD-10-CM

## 2024-06-21 DIAGNOSIS — E11.9 TYPE 2 DIABETES MELLITUS WITHOUT COMPLICATION, WITHOUT LONG-TERM CURRENT USE OF INSULIN (HCC): ICD-10-CM

## 2024-06-21 DIAGNOSIS — E03.9 ACQUIRED HYPOTHYROIDISM: ICD-10-CM

## 2024-06-21 LAB
ALBUMIN SERPL BCG-MCNC: 4.4 G/DL (ref 3.5–5)
ALP SERPL-CCNC: 86 U/L (ref 34–104)
ALT SERPL W P-5'-P-CCNC: 17 U/L (ref 7–52)
ANION GAP SERPL CALCULATED.3IONS-SCNC: 6 MMOL/L (ref 4–13)
AST SERPL W P-5'-P-CCNC: 19 U/L (ref 13–39)
BASOPHILS # BLD AUTO: 0.03 THOUSANDS/ÂΜL (ref 0–0.1)
BASOPHILS NFR BLD AUTO: 0 % (ref 0–1)
BILIRUB SERPL-MCNC: 0.56 MG/DL (ref 0.2–1)
BUN SERPL-MCNC: 20 MG/DL (ref 5–25)
CALCIUM SERPL-MCNC: 9.7 MG/DL (ref 8.4–10.2)
CHLORIDE SERPL-SCNC: 107 MMOL/L (ref 96–108)
CHOLEST SERPL-MCNC: 142 MG/DL
CO2 SERPL-SCNC: 28 MMOL/L (ref 21–32)
CREAT SERPL-MCNC: 0.78 MG/DL (ref 0.6–1.3)
EOSINOPHIL # BLD AUTO: 0.24 THOUSAND/ÂΜL (ref 0–0.61)
EOSINOPHIL NFR BLD AUTO: 3 % (ref 0–6)
ERYTHROCYTE [DISTWIDTH] IN BLOOD BY AUTOMATED COUNT: 12.1 % (ref 11.6–15.1)
EST. AVERAGE GLUCOSE BLD GHB EST-MCNC: 148 MG/DL
GFR SERPL CREATININE-BSD FRML MDRD: 80 ML/MIN/1.73SQ M
GLUCOSE P FAST SERPL-MCNC: 121 MG/DL (ref 65–99)
HBA1C MFR BLD: 6.8 %
HCT VFR BLD AUTO: 42.5 % (ref 34.8–46.1)
HDLC SERPL-MCNC: 48 MG/DL
HGB BLD-MCNC: 14.6 G/DL (ref 11.5–15.4)
IMM GRANULOCYTES # BLD AUTO: 0.01 THOUSAND/UL (ref 0–0.2)
IMM GRANULOCYTES NFR BLD AUTO: 0 % (ref 0–2)
LDLC SERPL CALC-MCNC: 73 MG/DL (ref 0–100)
LYMPHOCYTES # BLD AUTO: 2.99 THOUSANDS/ÂΜL (ref 0.6–4.47)
LYMPHOCYTES NFR BLD AUTO: 43 % (ref 14–44)
MCH RBC QN AUTO: 30.2 PG (ref 26.8–34.3)
MCHC RBC AUTO-ENTMCNC: 34.4 G/DL (ref 31.4–37.4)
MCV RBC AUTO: 88 FL (ref 82–98)
MONOCYTES # BLD AUTO: 0.48 THOUSAND/ÂΜL (ref 0.17–1.22)
MONOCYTES NFR BLD AUTO: 7 % (ref 4–12)
NEUTROPHILS # BLD AUTO: 3.27 THOUSANDS/ÂΜL (ref 1.85–7.62)
NEUTS SEG NFR BLD AUTO: 47 % (ref 43–75)
NRBC BLD AUTO-RTO: 0 /100 WBCS
PLATELET # BLD AUTO: 216 THOUSANDS/UL (ref 149–390)
PMV BLD AUTO: 11.2 FL (ref 8.9–12.7)
POTASSIUM SERPL-SCNC: 4.2 MMOL/L (ref 3.5–5.3)
PROT SERPL-MCNC: 7.3 G/DL (ref 6.4–8.4)
RBC # BLD AUTO: 4.84 MILLION/UL (ref 3.81–5.12)
SODIUM SERPL-SCNC: 141 MMOL/L (ref 135–147)
TRIGL SERPL-MCNC: 106 MG/DL
TSH SERPL DL<=0.05 MIU/L-ACNC: 0.68 UIU/ML (ref 0.45–4.5)
WBC # BLD AUTO: 7.02 THOUSAND/UL (ref 4.31–10.16)

## 2024-06-21 PROCEDURE — 80061 LIPID PANEL: CPT

## 2024-06-21 PROCEDURE — 36415 COLL VENOUS BLD VENIPUNCTURE: CPT

## 2024-06-21 PROCEDURE — 80053 COMPREHEN METABOLIC PANEL: CPT

## 2024-06-21 PROCEDURE — 84443 ASSAY THYROID STIM HORMONE: CPT

## 2024-06-21 PROCEDURE — 85025 COMPLETE CBC W/AUTO DIFF WBC: CPT

## 2024-06-21 PROCEDURE — 83036 HEMOGLOBIN GLYCOSYLATED A1C: CPT

## 2024-06-25 ENCOUNTER — OFFICE VISIT (OUTPATIENT)
Dept: FAMILY MEDICINE CLINIC | Facility: CLINIC | Age: 64
End: 2024-06-25
Payer: MEDICARE

## 2024-06-25 VITALS
DIASTOLIC BLOOD PRESSURE: 70 MMHG | OXYGEN SATURATION: 96 % | WEIGHT: 168 LBS | HEART RATE: 77 BPM | BODY MASS INDEX: 28.68 KG/M2 | SYSTOLIC BLOOD PRESSURE: 110 MMHG | HEIGHT: 64 IN | TEMPERATURE: 98.2 F

## 2024-06-25 DIAGNOSIS — E03.9 ACQUIRED HYPOTHYROIDISM: ICD-10-CM

## 2024-06-25 DIAGNOSIS — E78.2 MIXED HYPERLIPIDEMIA: ICD-10-CM

## 2024-06-25 DIAGNOSIS — E11.9 TYPE 2 DIABETES MELLITUS WITHOUT COMPLICATION, WITHOUT LONG-TERM CURRENT USE OF INSULIN (HCC): ICD-10-CM

## 2024-06-25 DIAGNOSIS — I10 ESSENTIAL HYPERTENSION: Primary | ICD-10-CM

## 2024-06-25 PROCEDURE — G2211 COMPLEX E/M VISIT ADD ON: HCPCS | Performed by: FAMILY MEDICINE

## 2024-06-25 PROCEDURE — 99214 OFFICE O/P EST MOD 30 MIN: CPT | Performed by: FAMILY MEDICINE

## 2024-06-25 NOTE — ASSESSMENT & PLAN NOTE
Lab Results   Component Value Date    HGBA1C 6.8 (H) 06/21/2024   Chronic asymptomatic fair control improvement hemoglobin A1c compared with before continue metformin extended release 500 mg once a day low-carb diet discussed with patient

## 2024-06-25 NOTE — ASSESSMENT & PLAN NOTE
Chronic asymptomatic fair control encourage patient to continue with the low-salt diet increase physical activity and discussed importance lose weight

## 2024-06-25 NOTE — PROGRESS NOTES
Ambulatory Visit  Name: Esha Oh      : 1960      MRN: [id number]  Encounter Provider: Shirley Almonte MD  Encounter Date: 2024   Encounter department: [facility]    Assessment & Plan   1. Essential hypertension  Assessment & Plan:  Chronic asymptomatic fair control encourage patient to continue with the low-salt diet increase physical activity and discussed importance lose weight  Orders:  -     Basic metabolic panel; Future  -     CBC and differential; Future  2. Mixed hyperlipidemia  Assessment & Plan:  Chronic asymptomatic fair control LDL close to therapeutic and diabetes patient continue with atorvastatin 20 mg once a day  Orders:  -     Basic metabolic panel; Future  -     CBC and differential; Future  -     Lipid Panel with Direct LDL reflex; Future  3. Acquired hypothyroidism  Assessment & Plan:  Chronic asymptomatic fair control TSH normal recommend to continue levothyroxine 112 mcg once a day  Orders:  -     Basic metabolic panel; Future  -     CBC and differential; Future  -     TSH, 3rd generation with Free T4 reflex; Future  4. Type 2 diabetes mellitus without complication, without long-term current use of insulin (HCC)  Assessment & Plan:    Lab Results   Component Value Date    HGBA1C 6.8 (H) 2024   Chronic asymptomatic fair control improvement hemoglobin A1c compared with before continue metformin extended release 500 mg once a day low-carb diet discussed with patient  Orders:  -     Basic metabolic panel; Future  -     CBC and differential; Future  -     Hemoglobin A1C; Future       History of Present Illness     Patient is here f/up with chronic condition ,complaint with med tolerated well no new concern recent blood work reviewed         Review of Systems   Constitutional:  Negative for chills and fever.   HENT:  Negative for ear pain and sore throat.    Eyes:  Negative for pain and visual disturbance.   Respiratory:  Negative for  "cough and shortness of breath.    Cardiovascular:  Negative for chest pain and palpitations.   Gastrointestinal:  Negative for abdominal pain, constipation, diarrhea and vomiting.   Genitourinary:  Negative for dysuria and hematuria.   Musculoskeletal:  Negative for arthralgias and back pain.   Skin:  Negative for color change and rash.   Neurological:  Negative for seizures and syncope.   All other systems reviewed and are negative.      Objective     /70 (BP Location: Left arm, Patient Position: Sitting)   Pulse 77   Temp 98.2 °F (36.8 °C) (Tympanic)   Ht 5' 3.5\" (1.613 m)   Wt 76.2 kg (168 lb)   SpO2 96%   Breastfeeding No   BMI 29.29 kg/m²     Physical Exam  Vitals and nursing note reviewed.   Constitutional:       General: She is not in acute distress.     Appearance: She is well-developed. She is not diaphoretic.   HENT:      Head: Normocephalic.      Right Ear: Tympanic membrane and external ear normal.      Left Ear: Tympanic membrane and external ear normal.      Nose: No rhinorrhea.      Mouth/Throat:      Pharynx: No posterior oropharyngeal erythema.   Eyes:      General:         Right eye: No discharge.         Left eye: No discharge.      Conjunctiva/sclera: Conjunctivae normal.   Neck:      Vascular: No JVD.   Cardiovascular:      Rate and Rhythm: Normal rate and regular rhythm.      Heart sounds: Normal heart sounds. No murmur heard.     No gallop.   Pulmonary:      Effort: Pulmonary effort is normal. No respiratory distress.      Breath sounds: Normal breath sounds. No stridor. No wheezing or rales.   Chest:      Chest wall: No tenderness.   Abdominal:      General: There is no distension.      Palpations: Abdomen is soft. There is no mass.      Tenderness: There is no abdominal tenderness. There is no rebound.   Musculoskeletal:         General: No tenderness.      Cervical back: Normal range of motion and neck supple.   Lymphadenopathy:      Cervical: No cervical adenopathy.   Skin:    "  General: Skin is warm.      Findings: No erythema or rash.   Neurological:      Mental Status: She is alert and oriented to person, place, and time.       Administrative Statements

## 2024-06-25 NOTE — ASSESSMENT & PLAN NOTE
Chronic asymptomatic fair control TSH normal recommend to continue levothyroxine 112 mcg once a day

## 2024-06-25 NOTE — ASSESSMENT & PLAN NOTE
Chronic asymptomatic fair control LDL close to therapeutic and diabetes patient continue with atorvastatin 20 mg once a day

## 2024-08-01 LAB
LEFT EYE DIABETIC RETINOPATHY: NORMAL
RIGHT EYE DIABETIC RETINOPATHY: NORMAL

## 2024-08-02 ENCOUNTER — OFFICE VISIT (OUTPATIENT)
Dept: FAMILY MEDICINE CLINIC | Facility: CLINIC | Age: 64
End: 2024-08-02
Payer: MEDICARE

## 2024-08-02 VITALS
BODY MASS INDEX: 28.75 KG/M2 | HEART RATE: 79 BPM | WEIGHT: 168.4 LBS | HEIGHT: 64 IN | OXYGEN SATURATION: 95 % | SYSTOLIC BLOOD PRESSURE: 130 MMHG | DIASTOLIC BLOOD PRESSURE: 68 MMHG | TEMPERATURE: 97.7 F

## 2024-08-02 DIAGNOSIS — Z23 ENCOUNTER FOR IMMUNIZATION: ICD-10-CM

## 2024-08-02 DIAGNOSIS — E66.3 OVERWEIGHT (BMI 25.0-29.9): ICD-10-CM

## 2024-08-02 DIAGNOSIS — E03.9 ACQUIRED HYPOTHYROIDISM: ICD-10-CM

## 2024-08-02 DIAGNOSIS — M54.50 CHRONIC LEFT-SIDED LOW BACK PAIN WITHOUT SCIATICA: ICD-10-CM

## 2024-08-02 DIAGNOSIS — E11.9 TYPE 2 DIABETES MELLITUS WITHOUT COMPLICATION, WITHOUT LONG-TERM CURRENT USE OF INSULIN (HCC): ICD-10-CM

## 2024-08-02 DIAGNOSIS — M25.552 LEFT HIP PAIN: ICD-10-CM

## 2024-08-02 DIAGNOSIS — Z00.00 MEDICARE WELCOME EXAM: Primary | ICD-10-CM

## 2024-08-02 DIAGNOSIS — G89.29 CHRONIC LEFT-SIDED LOW BACK PAIN WITHOUT SCIATICA: ICD-10-CM

## 2024-08-02 DIAGNOSIS — Z23 NEED FOR PNEUMOCOCCAL 20-VALENT CONJUGATE VACCINATION: ICD-10-CM

## 2024-08-02 PROCEDURE — 87086 URINE CULTURE/COLONY COUNT: CPT | Performed by: FAMILY MEDICINE

## 2024-08-02 PROCEDURE — 81001 URINALYSIS AUTO W/SCOPE: CPT | Performed by: FAMILY MEDICINE

## 2024-08-02 PROCEDURE — G0009 ADMIN PNEUMOCOCCAL VACCINE: HCPCS | Performed by: FAMILY MEDICINE

## 2024-08-02 PROCEDURE — 90677 PCV20 VACCINE IM: CPT | Performed by: FAMILY MEDICINE

## 2024-08-02 PROCEDURE — G0402 INITIAL PREVENTIVE EXAM: HCPCS | Performed by: FAMILY MEDICINE

## 2024-08-02 PROCEDURE — G0403 EKG FOR INITIAL PREVENT EXAM: HCPCS | Performed by: FAMILY MEDICINE

## 2024-08-02 PROCEDURE — 99214 OFFICE O/P EST MOD 30 MIN: CPT | Performed by: FAMILY MEDICINE

## 2024-08-02 RX ORDER — LEVOTHYROXINE SODIUM 112 UG/1
112 TABLET ORAL
Qty: 90 TABLET | Refills: 0 | Status: SHIPPED | OUTPATIENT
Start: 2024-08-02

## 2024-08-02 NOTE — PROGRESS NOTES
Ambulatory Visit  Name: Esha Oh      : 1960      MRN: [id number]  Encounter Provider: Shirley Almonte MD  Encounter Date: 2024   Encounter department: [facility]    Assessment & Plan   1. Medicare welcome exam  Assessment & Plan:  Advice and education were given regarding nutrition, aerobic exercises, weight-bearing exercises, cardiovascular risk reduction, fall risk reduction, and age-appropriate supplements.     The patient was counseled regarding instructions for management, risk factor reductions, prognosis, risks and benefits of treatment options, patient and family education, and importance of compliance with treatment.  Patient is due for 20 discussed with patient and she agree  Orders:  -     POCT ECG  2. Left hip pain  Assessment & Plan:  Acute on chronic symptomatic no recent fall or trauma no limited range of motion possible radiating from her lumbar spine recommend x-ray of the left hip Tylenol for the pain  Orders:  -     XR hip/pelv 2-3 vws left if performed; Future; Expected date: 2024  3. Chronic left-sided low back pain without sciatica  Assessment & Plan:  Acute on chronic symptomatic no recent fall or trauma recommend x-ray of the lumbar spine Tylenol for the pain discussed important lose weight and proper possible  Orders:  -     Urinalysis with microscopic; Future  -     Urine culture; Future  -     Urinalysis with microscopic  -     Urine culture  4. Overweight (BMI 25.0-29.9)  Assessment & Plan:  Chronic BMI today 29.3 discussed with patient portion control low-carb low-fat diet increase physical activity  5. Acquired hypothyroidism  -     levothyroxine 112 mcg tablet; Take 1 tablet (112 mcg total) by mouth daily in the early morning  6. Type 2 diabetes mellitus without complication, without long-term current use of insulin (HCC)  7. Encounter for immunization  -     Pneumococcal Conjugate Vaccine 20-valent (Pcv20)  8. Need for  pneumococcal 20-valent conjugate vaccination  -     Pneumococcal Conjugate Vaccine 20-valent (Pcv20)      Depression Screening and Follow-up Plan: Patient was screened for depression during today's encounter. They screened negative with a PHQ-2 score of 0.      Preventive health issues were discussed with patient, and age appropriate screening tests were ordered as noted in patient's After Visit Summary. Personalized health advice and appropriate referrals for health education or preventive services given if needed, as noted in patient's After Visit Summary.    History of Present Illness     Patient here welcome to Medicare exam and she is concerned about low back pain she described it as achy radiated to bilateral lower extremity and go below her knee no drop on the foot no muscle weakness no rash no lose control of the stool no recent fall or trauma no fever or weight change also concerned about left hip pain she described it as achy when she go up and down steps is worse no swelling no redness       Patient Care Team:  Shirley Almonte MD as PCP - General  Jose Simmons DO    Review of Systems   Constitutional:  Negative for chills and fever.   HENT:  Negative for ear pain and sore throat.    Eyes:  Negative for pain and visual disturbance.   Respiratory:  Negative for cough and shortness of breath.    Cardiovascular:  Negative for chest pain and palpitations.   Gastrointestinal:  Negative for abdominal pain, constipation, diarrhea and vomiting.   Genitourinary:  Negative for dysuria and hematuria.   Musculoskeletal:  Positive for arthralgias and back pain.   Skin:  Negative for color change and rash.   Neurological:  Negative for seizures and syncope.   Hematological:  Does not bruise/bleed easily.   Psychiatric/Behavioral:  Negative for behavioral problems.    All other systems reviewed and are negative.    Medical History Reviewed by provider this encounter:  Tobacco  Allergies  Meds  Problems  Med Hx   Surg Hx  Fam Hx       Annual Wellness Visit Questionnaire   Esha is here for her Welcome to Medicare visit.     Health Risk Assessment:   Patient rates overall health as very good. Patient feels that their physical health rating is slightly better. Patient is satisfied with their life. Eyesight was rated as same. Hearing was rated as same. Patient feels that their emotional and mental health rating is same. Patients states they are never, rarely angry. Patient states they are sometimes unusually tired/fatigued. Pain experienced in the last 7 days has been some. Patient's pain rating has been 5/10. Back pain    Depression Screening:   PHQ-2 Score: 0      Fall Risk Screening:   In the past year, patient has experienced: no history of falling in past year      Urinary Incontinence Screening:   Patient has not leaked urine accidently in the last six months.     Home Safety:  Patient does not have trouble with stairs inside or outside of their home. Patient has working smoke alarms and has working carbon monoxide detector. Home safety hazards include: none.     Nutrition:   Current diet is Regular.     Medications:   Patient is currently taking over-the-counter supplements. OTC medications include: see medication list. Patient is able to manage medications.     Activities of Daily Living (ADLs)/Instrumental Activities of Daily Living (IADLs):   Walk and transfer into and out of bed and chair?: Yes  Dress and groom yourself?: Yes    Bathe or shower yourself?: Yes    Feed yourself? Yes  Do your laundry/housekeeping?: Yes  Manage your money, pay your bills and track your expenses?: Yes  Make your own meals?: Yes    Do your own shopping?: Yes    Previous Hospitalizations:   Any hospitalizations or ED visits within the last 12 months?: Yes    How many hospitalizations have you had in the last year?: 1-2    Advance Care Planning:   Living will: No    Durable POA for healthcare: No    Five wishes given: No      Cognitive  Screening:   Provider or family/friend/caregiver concerned regarding cognition?: No    PREVENTIVE SCREENINGS      Cardiovascular Screening:    General: Screening Not Indicated and History Lipid Disorder      Diabetes Screening:     General: Screening Not Indicated and History Diabetes      Colorectal Cancer Screening:     General: Screening Current      Breast Cancer Screening:     General: Screening Current      Cervical Cancer Screening:    General: Screening Not Indicated      Osteoporosis Screening:    General: Screening Current      Abdominal Aortic Aneurysm (AAA) Screening:        General: Screening Not Indicated      Lung Cancer Screening:     General: Screening Not Indicated      Hepatitis C Screening:    General: Screening Current    Screening, Brief Intervention, and Referral to Treatment (SBIRT)    Screening  Typical number of drinks in a day: 0  Typical number of drinks in a week: 0  Interpretation: Low risk drinking behavior.    AUDIT-C Screenin) How often did you have a drink containing alcohol in the past year? never  2) How many drinks did you have on a typical day when you were drinking in the past year? 0  3) How often did you have 6 or more drinks on one occasion in the past year? never    AUDIT-C Score: 0  Interpretation: Score 0-2 (female): Negative screen for alcohol misuse    Single Item Drug Screening:  How often have you used an illegal drug (including marijuana) or a prescription medication for non-medical reasons in the past year? never    Single Item Drug Screen Score: 0  Interpretation: Negative screen for possible drug use disorder    Brief Intervention  Alcohol & drug use screenings were reviewed. No concerns regarding substance use disorder identified.     Social Determinants of Health     Financial Resource Strain: Low Risk  (12/15/2021)    Overall Financial Resource Strain (CARDIA)     Difficulty of Paying Living Expenses: Not hard at all   Food Insecurity: No Food Insecurity  "(8/2/2024)    Hunger Vital Sign     Worried About Running Out of Food in the Last Year: Never true     Ran Out of Food in the Last Year: Never true   Transportation Needs: No Transportation Needs (8/2/2024)    PRAPARE - Transportation     Lack of Transportation (Medical): No     Lack of Transportation (Non-Medical): No   Housing Stability: Low Risk  (8/2/2024)    Housing Stability Vital Sign     Unable to Pay for Housing in the Last Year: No     Number of Times Moved in the Last Year: 1     Homeless in the Last Year: No   Utilities: Not At Risk (8/2/2024)    [facility] Utilities     Threatened with loss of utilities: No     Hearing Screening    500Hz 1000Hz 2000Hz 4000Hz   Right ear 25 20 25 25   Left ear 40 40 40 0     Vision Screening    Right eye Left eye Both eyes   Without correction      With correction 20/30 20/15 20/15       Objective     /68 (BP Location: Left arm, Patient Position: Sitting)   Pulse 79   Temp 97.7 °F (36.5 °C) (Tympanic)   Ht 5' 3.5\" (1.613 m)   Wt 76.4 kg (168 lb 6.4 oz)   SpO2 95%   BMI 29.36 kg/m²     Physical Exam  Vitals and nursing note reviewed.   Constitutional:       General: She is not in acute distress.     Appearance: She is well-developed. She is not diaphoretic.   HENT:      Head: Normocephalic.      Right Ear: Tympanic membrane and external ear normal.      Left Ear: Tympanic membrane and external ear normal.      Nose: No rhinorrhea.      Mouth/Throat:      Pharynx: No posterior oropharyngeal erythema.   Eyes:      General:         Right eye: No discharge.         Left eye: No discharge.      Conjunctiva/sclera: Conjunctivae normal.   Neck:      Vascular: No JVD.   Cardiovascular:      Rate and Rhythm: Normal rate and regular rhythm.      Heart sounds: Normal heart sounds. No murmur heard.     No gallop.   Pulmonary:      Effort: Pulmonary effort is normal. No respiratory distress.      Breath sounds: Normal breath sounds. No stridor. No wheezing or rales.   Chest:      " Chest wall: No tenderness.   Abdominal:      General: There is no distension.      Palpations: Abdomen is soft. There is no mass.      Tenderness: There is no abdominal tenderness. There is no rebound.   Musculoskeletal:      Cervical back: Normal range of motion and neck supple.      Lumbar back: Tenderness present. No signs of trauma or bony tenderness. Positive right straight leg raise test and positive left straight leg raise test.      Left hip: Tenderness present. No crepitus. Normal range of motion.   Lymphadenopathy:      Cervical: No cervical adenopathy.   Skin:     General: Skin is warm.      Findings: No erythema or rash.   Neurological:      Mental Status: She is alert and oriented to person, place, and time.

## 2024-08-02 NOTE — PATIENT INSTRUCTIONS
Medicare Preventive Visit Patient Instructions  Thank you for completing your Welcome to Medicare Visit or Medicare Annual Wellness Visit today. Your next wellness visit will be due in one year (8/3/2025).  The screening/preventive services that you may require over the next 5-10 years are detailed below. Some tests may not apply to you based off risk factors and/or age. Screening tests ordered at today's visit but not completed yet may show as past due. Also, please note that scanned in results may not display below.  Preventive Screenings:  Service Recommendations Previous Testing/Comments   Colorectal Cancer Screening  * Colonoscopy    * Fecal Occult Blood Test (FOBT)/Fecal Immunochemical Test (FIT)  * Fecal DNA/Cologuard Test  * Flexible Sigmoidoscopy Age: 45-75 years old   Colonoscopy: every 10 years (may be performed more frequently if at higher risk)  OR  FOBT/FIT: every 1 year  OR  Cologuard: every 3 years  OR  Sigmoidoscopy: every 5 years  Screening may be recommended earlier than age 45 if at higher risk for colorectal cancer. Also, an individualized decision between you and your healthcare provider will decide whether screening between the ages of 76-85 would be appropriate. Colonoscopy: 07/10/2015  FOBT/FIT: Not on file  Cologuard: Not on file  Sigmoidoscopy: Not on file    Screening Current     Breast Cancer Screening Age: 40+ years old  Frequency: every 1-2 years  Not required if history of left and right mastectomy Mammogram: 02/22/2024    Screening Current   Cervical Cancer Screening Between the ages of 21-29, pap smear recommended once every 3 years.   Between the ages of 30-65, can perform pap smear with HPV co-testing every 5 years.   Recommendations may differ for women with a history of total hysterectomy, cervical cancer, or abnormal pap smears in past. Pap Smear: Not on file    Screening Not Indicated   Hepatitis C Screening Once for adults born between 1945 and 1965  More frequently in  patients at high risk for Hepatitis C Hep C Antibody: 07/29/2022    Screening Current   Diabetes Screening 1-2 times per year if you're at risk for diabetes or have pre-diabetes Fasting glucose: 121 mg/dL (6/21/2024)  A1C: 6.8 % (6/21/2024)  Screening Not Indicated  History Diabetes   Cholesterol Screening Once every 5 years if you don't have a lipid disorder. May order more often based on risk factors. Lipid panel: 06/21/2024    Screening Not Indicated  History Lipid Disorder     Other Preventive Screenings Covered by Medicare:  Abdominal Aortic Aneurysm (AAA) Screening: covered once if your at risk. You're considered to be at risk if you have a family history of AAA.  Lung Cancer Screening: covers low dose CT scan once per year if you meet all of the following conditions: (1) Age 55-77; (2) No signs or symptoms of lung cancer; (3) Current smoker or have quit smoking within the last 15 years; (4) You have a tobacco smoking history of at least 20 pack years (packs per day multiplied by number of years you smoked); (5) You get a written order from a healthcare provider.  Glaucoma Screening: covered annually if you're considered high risk: (1) You have diabetes OR (2) Family history of glaucoma OR (3)  aged 50 and older OR (4)  American aged 65 and older  Osteoporosis Screening: covered every 2 years if you meet one of the following conditions: (1) You're estrogen deficient and at risk for osteoporosis based off medical history and other findings; (2) Have a vertebral abnormality; (3) On glucocorticoid therapy for more than 3 months; (4) Have primary hyperparathyroidism; (5) On osteoporosis medications and need to assess response to drug therapy.   Last bone density test (DXA Scan): 05/10/2024.  HIV Screening: covered annually if you're between the age of 15-65. Also covered annually if you are younger than 15 and older than 65 with risk factors for HIV infection. For pregnant patients, it is  covered up to 3 times per pregnancy.    Immunizations:  Immunization Recommendations   Influenza Vaccine Annual influenza vaccination during flu season is recommended for all persons aged >= 6 months who do not have contraindications   Pneumococcal Vaccine   * Pneumococcal conjugate vaccine = PCV13 (Prevnar 13), PCV15 (Vaxneuvance), PCV20 (Prevnar 20)  * Pneumococcal polysaccharide vaccine = PPSV23 (Pneumovax) Adults 19-65 yo with certain risk factors or if 65+ yo  If never received any pneumonia vaccine: recommend Prevnar 20 (PCV20)  Give PCV20 if previously received 1 dose of PCV13 or PPSV23   Hepatitis B Vaccine 3 dose series if at intermediate or high risk (ex: diabetes, end stage renal disease, liver disease)   Respiratory syncytial virus (RSV) Vaccine - COVERED BY MEDICARE PART D  * RSVPreF3 (Arexvy) CDC recommends that adults 60 years of age and older may receive a single dose of RSV vaccine using shared clinical decision-making (SCDM)   Tetanus (Td) Vaccine - COST NOT COVERED BY MEDICARE PART B Following completion of primary series, a booster dose should be given every 10 years to maintain immunity against tetanus. Td may also be given as tetanus wound prophylaxis.   Tdap Vaccine - COST NOT COVERED BY MEDICARE PART B Recommended at least once for all adults. For pregnant patients, recommended with each pregnancy.   Shingles Vaccine (Shingrix) - COST NOT COVERED BY MEDICARE PART B  2 shot series recommended in those 19 years and older who have or will have weakened immune systems or those 50 years and older     Health Maintenance Due:      Topic Date Due   • Breast Cancer Screening: Mammogram  02/22/2025   • Colorectal Cancer Screening  07/10/2025   • HIV Screening  Completed   • Hepatitis C Screening  Completed     Immunizations Due:      Topic Date Due   • Pneumococcal Vaccine: 65+ Years (2 of 2 - PCV) 02/04/2020   • COVID-19 Vaccine (4 - 2023-24 season) 09/01/2023   • Influenza Vaccine (1) 09/01/2024      Advance Directives   What are advance directives?  Advance directives are legal documents that state your wishes and plans for medical care. These plans are made ahead of time in case you lose your ability to make decisions for yourself. Advance directives can apply to any medical decision, such as the treatments you want, and if you want to donate organs.   What are the types of advance directives?  There are many types of advance directives, and each state has rules about how to use them. You may choose a combination of any of the following:  Living will:  This is a written record of the treatment you want. You can also choose which treatments you do not want, which to limit, and which to stop at a certain time. This includes surgery, medicine, IV fluid, and tube feedings.   Durable power of  for healthcare (DPAHC):  This is a written record that states who you want to make healthcare choices for you when you are unable to make them for yourself. This person, called a proxy, is usually a family member or a friend. You may choose more than 1 proxy.  Do not resuscitate (DNR) order:  A DNR order is used in case your heart stops beating or you stop breathing. It is a request not to have certain forms of treatment, such as CPR. A DNR order may be included in other types of advance directives.  Medical directive:  This covers the care that you want if you are in a coma, near death, or unable to make decisions for yourself. You can list the treatments you want for each condition. Treatment may include pain medicine, surgery, blood transfusions, dialysis, IV or tube feedings, and a ventilator (breathing machine).  Values history:  This document has questions about your views, beliefs, and how you feel and think about life. This information can help others choose the care that you would choose.  Why are advance directives important?  An advance directive helps you control your care. Although spoken wishes may  be used, it is better to have your wishes written down. Spoken wishes can be misunderstood, or not followed. Treatments may be given even if you do not want them. An advance directive may make it easier for your family to make difficult choices about your care.   Weight Management   Why it is important to manage your weight:  Being overweight increases your risk of health conditions such as heart disease, high blood pressure, type 2 diabetes, and certain types of cancer. It can also increase your risk for osteoarthritis, sleep apnea, and other respiratory problems. Aim for a slow, steady weight loss. Even a small amount of weight loss can lower your risk of health problems.  How to lose weight safely:  A safe and healthy way to lose weight is to eat fewer calories and get regular exercise. You can lose up about 1 pound a week by decreasing the number of calories you eat by 500 calories each day.   Healthy meal plan for weight management:  A healthy meal plan includes a variety of foods, contains fewer calories, and helps you stay healthy. A healthy meal plan includes the following:  Eat whole-grain foods more often.  A healthy meal plan should contain fiber. Fiber is the part of grains, fruits, and vegetables that is not broken down by your body. Whole-grain foods are healthy and provide extra fiber in your diet. Some examples of whole-grain foods are whole-wheat breads and pastas, oatmeal, brown rice, and bulgur.  Eat a variety of vegetables every day.  Include dark, leafy greens such as spinach, kale, pamela greens, and mustard greens. Eat yellow and orange vegetables such as carrots, sweet potatoes, and winter squash.   Eat a variety of fruits every day.  Choose fresh or canned fruit (canned in its own juice or light syrup) instead of juice. Fruit juice has very little or no fiber.  Eat low-fat dairy foods.  Drink fat-free (skim) milk or 1% milk. Eat fat-free yogurt and low-fat cottage cheese. Try low-fat  cheeses such as mozzarella and other reduced-fat cheeses.  Choose meat and other protein foods that are low in fat.  Choose beans or other legumes such as split peas or lentils. Choose fish, skinless poultry (chicken or turkey), or lean cuts of red meat (beef or pork). Before you cook meat or poultry, cut off any visible fat.   Use less fat and oil.  Try baking foods instead of frying them. Add less fat, such as margarine, sour cream, regular salad dressing and mayonnaise to foods. Eat fewer high-fat foods. Some examples of high-fat foods include french fries, doughnuts, ice cream, and cakes.  Eat fewer sweets.  Limit foods and drinks that are high in sugar. This includes candy, cookies, regular soda, and sweetened drinks.  Exercise:  Exercise at least 30 minutes per day on most days of the week. Some examples of exercise include walking, biking, dancing, and swimming. You can also fit in more physical activity by taking the stairs instead of the elevator or parking farther away from stores. Ask your healthcare provider about the best exercise plan for you.      © Copyright [employer] / [employer] 2018 Information is for End User's use only and may not be sold, redistributed or otherwise used for commercial purposes. All illustrations and images included in CareNotes® are the copyrighted property of [employer]M., Inc[employer] or [employer]o

## 2024-08-03 LAB
BACTERIA UR CULT: NORMAL
BACTERIA UR QL AUTO: ABNORMAL /HPF
BILIRUB UR QL STRIP: NEGATIVE
CLARITY UR: CLEAR
COLOR UR: YELLOW
GLUCOSE UR STRIP-MCNC: ABNORMAL MG/DL
HGB UR QL STRIP.AUTO: NEGATIVE
HYALINE CASTS #/AREA URNS LPF: ABNORMAL /LPF
KETONES UR STRIP-MCNC: NEGATIVE MG/DL
LEUKOCYTE ESTERASE UR QL STRIP: NEGATIVE
MUCOUS THREADS UR QL AUTO: ABNORMAL
NITRITE UR QL STRIP: NEGATIVE
NON-SQ EPI CELLS URNS QL MICRO: ABNORMAL /HPF
PH UR STRIP.AUTO: 5.5 [PH]
PROT UR STRIP-MCNC: ABNORMAL MG/DL
RBC #/AREA URNS AUTO: ABNORMAL /HPF
SP GR UR STRIP.AUTO: 1.03 (ref 1–1.03)
UROBILINOGEN UR STRIP-ACNC: <2 MG/DL
WBC #/AREA URNS AUTO: ABNORMAL /HPF

## 2024-08-05 PROBLEM — Z00.01 ENCOUNTER FOR WELL ADULT EXAM WITH ABNORMAL FINDINGS: Status: RESOLVED | Noted: 2018-12-10 | Resolved: 2024-08-05

## 2024-08-05 PROBLEM — M54.50 CHRONIC LEFT-SIDED LOW BACK PAIN WITHOUT SCIATICA: Status: ACTIVE | Noted: 2024-03-22

## 2024-08-05 PROBLEM — Z00.00 MEDICARE WELCOME EXAM: Status: ACTIVE | Noted: 2024-08-05

## 2024-08-05 NOTE — ASSESSMENT & PLAN NOTE
Advice and education were given regarding nutrition, aerobic exercises, weight-bearing exercises, cardiovascular risk reduction, fall risk reduction, and age-appropriate supplements.     The patient was counseled regarding instructions for management, risk factor reductions, prognosis, risks and benefits of treatment options, patient and family education, and importance of compliance with treatment.  Patient is due for 20 discussed with patient and she agree

## 2024-08-05 NOTE — ASSESSMENT & PLAN NOTE
Acute on chronic symptomatic no recent fall or trauma recommend x-ray of the lumbar spine Tylenol for the pain discussed important lose weight and proper possible

## 2024-08-05 NOTE — ASSESSMENT & PLAN NOTE
Acute on chronic symptomatic no recent fall or trauma no limited range of motion possible radiating from her lumbar spine recommend x-ray of the left hip Tylenol for the pain

## 2024-08-05 NOTE — ASSESSMENT & PLAN NOTE
Chronic BMI today 29.3 discussed with patient portion control low-carb low-fat diet increase physical activity

## 2024-08-08 ENCOUNTER — HOSPITAL ENCOUNTER (OUTPATIENT)
Dept: RADIOLOGY | Facility: HOSPITAL | Age: 64
Discharge: HOME | End: 2024-08-08
Payer: MEDICARE

## 2024-08-08 DIAGNOSIS — M25.552 LEFT HIP PAIN: ICD-10-CM

## 2024-08-08 PROCEDURE — 73502 X-RAY EXAM HIP UNI 2-3 VIEWS: CPT

## 2024-08-09 ENCOUNTER — TELEPHONE (OUTPATIENT)
Dept: FAMILY MEDICINE CLINIC | Facility: CLINIC | Age: 64
End: 2024-08-09

## 2024-08-09 DIAGNOSIS — M47.816 OSTEOARTHRITIS OF LUMBAR SPINE, UNSPECIFIED SPINAL OSTEOARTHRITIS COMPLICATION STATUS: Primary | ICD-10-CM

## 2024-08-09 NOTE — TELEPHONE ENCOUNTER
----- Message from Shirley Almonte MD sent at 8/9/2024 11:50 AM EDT -----  Osteoarthritis of lumbar spine   Refer to PT

## 2024-08-16 PROBLEM — I47.10 SVT (SUPRAVENTRICULAR TACHYCARDIA): Status: ACTIVE | Noted: 2024-08-16

## 2024-08-16 NOTE — ASSESSMENT & PLAN NOTE
Lab Results   Component Value Date    CHOLESTEROL 142 06/21/2024     Lab Results   Component Value Date    TRIG 106 06/21/2024     Lab Results   Component Value Date    HDL 48 (L) 06/21/2024     Lab Results   Component Value Date    LDLCALC 73 06/21/2024     Atorvastatin 20 mg daily

## 2024-08-16 NOTE — ASSESSMENT & PLAN NOTE
Patient has symptoms of palpitations mainly when she lays down at night although sometimes it also occurs during the day.  Her heart feels like it is pounding.  She does have some shortness of breath with these palpitations but it may be secondary to anxiety.  She is concerned that she might have a heart attack or even die.  These episodes last for 5 or 10 minutes and then giuliano    She had a 48 hr holter monitor performed which demonstrates a HR ranged from 43 to 136 bpm with 12 episodes of nonsustained supraventricular tachycardia with the longest run lasting for 19 beats at a heart rate of 150 bpm.  Treating this SVT with a heart rate at times as low as 43 bpm represents a problem.  I do do not feel that her rhythm as it presently is warrant a pacemaker to treat the supraventricular tachycardia.    Treating this rhythm with a heart rate at times as low as 43 bpm represents a problem. Treatment would require either a beta-blocker or calcium channel blocker which would make her heart rate even slower. I am reluctant to do this because it could bring on syncope. I do do not feel that her rhythm as it presently is warrant a pacemaker to treat the supraventricular tachycardia

## 2024-08-17 DIAGNOSIS — E11.9 TYPE 2 DIABETES MELLITUS WITHOUT COMPLICATION, WITHOUT LONG-TERM CURRENT USE OF INSULIN (HCC): ICD-10-CM

## 2024-08-18 RX ORDER — METFORMIN HCL 500 MG
500 TABLET, EXTENDED RELEASE 24 HR ORAL
Qty: 90 TABLET | Refills: 1 | Status: SHIPPED | OUTPATIENT
Start: 2024-08-18

## 2024-08-20 DIAGNOSIS — E78.2 MIXED HYPERLIPIDEMIA: ICD-10-CM

## 2024-08-20 DIAGNOSIS — G47.09 OTHER INSOMNIA: ICD-10-CM

## 2024-08-20 RX ORDER — ATORVASTATIN CALCIUM 20 MG/1
TABLET, FILM COATED ORAL
Qty: 90 TABLET | Refills: 1 | Status: SHIPPED | OUTPATIENT
Start: 2024-08-20

## 2024-08-20 RX ORDER — TRAZODONE HYDROCHLORIDE 50 MG/1
50 TABLET, FILM COATED ORAL
Qty: 90 TABLET | Refills: 1 | Status: SHIPPED | OUTPATIENT
Start: 2024-08-20

## 2024-08-26 ENCOUNTER — OFFICE VISIT (OUTPATIENT)
Dept: CARDIOLOGY CLINIC | Facility: CLINIC | Age: 64
End: 2024-08-26
Payer: MEDICARE

## 2024-08-26 VITALS
SYSTOLIC BLOOD PRESSURE: 120 MMHG | BODY MASS INDEX: 29.64 KG/M2 | WEIGHT: 170 LBS | DIASTOLIC BLOOD PRESSURE: 70 MMHG | HEART RATE: 76 BPM

## 2024-08-26 DIAGNOSIS — I47.10 SVT (SUPRAVENTRICULAR TACHYCARDIA): Primary | ICD-10-CM

## 2024-08-26 DIAGNOSIS — I10 ESSENTIAL HYPERTENSION: ICD-10-CM

## 2024-08-26 DIAGNOSIS — E78.00 PURE HYPERCHOLESTEROLEMIA: ICD-10-CM

## 2024-08-26 DIAGNOSIS — E11.9 TYPE 2 DIABETES MELLITUS WITHOUT COMPLICATION, WITHOUT LONG-TERM CURRENT USE OF INSULIN (HCC): ICD-10-CM

## 2024-08-26 DIAGNOSIS — E11.22 TYPE 2 DIABETES MELLITUS WITH STAGE 2 CHRONIC KIDNEY DISEASE, WITHOUT LONG-TERM CURRENT USE OF INSULIN  (HCC): ICD-10-CM

## 2024-08-26 DIAGNOSIS — N18.2 TYPE 2 DIABETES MELLITUS WITH STAGE 2 CHRONIC KIDNEY DISEASE, WITHOUT LONG-TERM CURRENT USE OF INSULIN  (HCC): ICD-10-CM

## 2024-08-26 PROCEDURE — 99214 OFFICE O/P EST MOD 30 MIN: CPT | Performed by: INTERNAL MEDICINE

## 2024-08-26 NOTE — PROGRESS NOTES
[facility]  [address] [place] [address]  Phone#  [phone]   Fax#  [phone]  *-*-*-*-*-*-*-*-*-*-*-*-*-*-*-*-*-*-*-*-*-*-*-*-*-*-*-*-*-*-*-*-*-*-*-*-*-*-*-*-*-*-*-*-*-*-*-*-*-*-*-*-*-*                                   Cardiology Follow Up      ENCOUNTER DATE: 24 9:44 AM  PATIENT NAME: Esha Oh   : 1960    MRN: [id number]  AGE:64 y.o.      SEX: female  ENCOUNTER PROVIDER:Harry Quach MD     PRIMARY CARE PHYSICIAN: Shirley Almonte MD    CARDIOLOGY SPECIALTY COMMENTS  Patient referred to cardiology by  Shirley Almonte MD for palpitations and shortness of breath.     Patient has symptoms of palpitations mainly when she lays down at night although sometimes it also occurs during the day.  Her heart feels like it is pounding.  She does have some shortness of breath with these palpitations but it may be secondary to anxiety.  She is concerned that she might have a heart attack or even die.  These episodes last for 5 or 10 minutes and then giuliano.     She had a 48 hr holter monitor performed which demonstrates a HR ranged from 43 to 136 bpm with 12 episodes of nonsustained supraventricular tachycardia with the longest run lasting for 19 beats at a heart rate of 150 bpm.  Treating this SVT with a heart rate at times as low as 43 bpm represents a problem.  I do do not feel that her rhythm as it presently is warrant a pacemaker to treat the supraventricular tachycardia.    2023 stress echo: Nondiagnostic for ischemia due to failure of patient to reach target heart rate.  Maximum heart rate was 78% of MPHR.  Resting EF 60% with grade 1 diastolic dysfunction.  Severely impaired exercise capacity due to fatigue and shortness of breath.  Stage 2 hypertension at rest.  Normal ECG.    2023 48-hour Holter monitor: Heart rate ranged from 43 to 136 bpm and average 71 bpm.  48 ventricular ectopic beats.  177 supraventricular ectopic beats.  12 nonsustained atrial  runs longest lasting 19 beats at a heart rate of 150 bpm.  Longest R to R interval 1.5 seconds.  No significant bradycardia or heart block.    ACTIVE PROBLEM LIST  Patient Active Problem List   Diagnosis    Mild intermittent asthma without complication    Cervical myofascial pain syndrome    Cervical stenosis of spinal canal    Degeneration of lumbar intervertebral disc    Encounter for monitoring chronic NSAID therapy    Essential hypertension    Hypothyroidism    Gastritis due to nonsteroidal anti-inflammatory drug    Kidney stone    Pure hypercholesterolemia    Neuralgia    Osteoarthritis    Primary osteoarthritis of left knee    Vitamin D deficiency    Immunization refused    Overweight (BMI 25.0-29.9)    Weakness of right arm    LUIS positive    Type 2 diabetes mellitus with stage 2 chronic kidney disease, without long-term current use of insulin  (HCC)    Snoring    Other insomnia    Anxiety    Herpes zoster without complication    Vertigo    Other microscopic hematuria    Varicose veins of left lower extremity with pain    Chronic pain of right knee    Breast pain, left    Pain of left lower extremity    LUQ pain    First degree burn    Generalized abdominal pain    Pre-op examination    Hepatic steatosis    Cataracta    Situational anxiety    Left hip pain    Leiomyoma of uterus    Chronic pain of left knee    Palpitation    SOB (shortness of breath)    Blood in stool    Discogenic cervical pain    Acute diverticulitis    Post-menopause    Abnormal mammogram    Chronic left-sided low back pain without sciatica    Medicare welcome exam    SVT (supraventricular tachycardia)       ACTIVE DIAGNOSIS AND PLAN    1. SVT (supraventricular tachycardia)  Assessment & Plan:  Patient has symptoms of palpitations mainly when she lays down at night although sometimes it also occurs during the day.  Her heart feels like it is pounding.  She does have some shortness of breath with these palpitations but it may be secondary  to anxiety.  She is concerned that she might have a heart attack or even die.  These episodes last for 5 or 10 minutes and then giuliano    She had a 48 hr holter monitor performed which demonstrates a HR ranged from 43 to 136 bpm with 12 episodes of nonsustained supraventricular tachycardia with the longest run lasting for 19 beats at a heart rate of 150 bpm.  Treating this SVT with a heart rate at times as low as 43 bpm represents a problem.  I do do not feel that her rhythm as it presently is warrant a pacemaker to treat the supraventricular tachycardia.    Treating this rhythm with a heart rate at times as low as 43 bpm represents a problem. Treatment would require either a beta-blocker or calcium channel blocker which would make her heart rate even slower. I am reluctant to do this because it could bring on syncope. I do do not feel that her rhythm as it presently is warrant a pacemaker to treat the supraventricular tachycardia   Orders:  -     AMB extended holter monitor; Future; Expected date: 09/02/2024  2. Pure hypercholesterolemia  Assessment & Plan:  Lab Results   Component Value Date    CHOLESTEROL 142 06/21/2024     Lab Results   Component Value Date    TRIG 106 06/21/2024     Lab Results   Component Value Date    HDL 48 (L) 06/21/2024     Lab Results   Component Value Date    LDLCALC 73 06/21/2024     Atorvastatin 20 mg daily  3. Essential hypertension  Assessment & Plan:  3/22/2024  120/74  6/25/2024 110/70  8/2/2024 130/68    Controlled on no medications    4. Type 2 diabetes mellitus without complication, without long-term current use of insulin ([facility])  Assessment & Plan:    Lab Results   Component Value Date    HGBA1C 6.8 (H) 06/21/2024     Defer management to PCP  5. Type 2 diabetes mellitus with stage 2 chronic kidney disease, without long-term current use of insulin  ([facility])  Assessment & Plan:    Lab Results   Component Value Date    HGBA1C 6.8 (H) 06/21/2024     Defer management to PCP     INTERVAL  HISTORY:        Patient with known SVT has palpitations which is of a concern to her.  She feels her heart racing for short periods of time and sometimes she needs to take a deep breath.  Tried to explain to her that these arrhythmias are benign and to just reassure her and that nothing is going to happen.  It is not going to cause a heart attack.  To treat the arrhythmias, her heart may go too slow and she might need a pacemaker.  I would prefer that she just get used to the arrhythmias and not worry about them.  She does not get dizzy or lightheaded.  Her only symptom is sometimes she has to take a deep breath.    DISCUSSION/PLAN:          Zio patch monitor for 2 weeks  Return in 2 months    No results found for this visit on 08/26/24.      Lab Studies:    Lab Results   Component Value Date    CHOLESTEROL 142 06/21/2024    CHOLESTEROL 131 02/19/2024    CHOLESTEROL 150 10/17/2023     Lab Results   Component Value Date    TRIG 106 06/21/2024    TRIG 96 02/19/2024    TRIG 115 10/17/2023     Lab Results   Component Value Date    HDL 48 (L) 06/21/2024    HDL 50 02/19/2024    HDL 50 10/17/2023     Lab Results   Component Value Date    LDLCALC 73 06/21/2024    LDLCALC 62 02/19/2024    LDLCALC 77 10/17/2023         Lab Results   Component Value Date    HGBA1C 6.8 (H) 06/21/2024    HGBA1C 7.0 (H) 02/19/2024    HGBA1C 7.0 (H) 10/17/2023      Lab Results   Component Value Date    EGFR 80 06/21/2024    EGFR 88 02/19/2024    EGFR 92 08/01/2023    SODIUM 141 06/21/2024    SODIUM 140 02/19/2024    SODIUM 138 08/01/2023    K 4.2 06/21/2024    K 3.9 02/19/2024    K 4.0 08/01/2023     06/21/2024     02/19/2024     08/01/2023    CO2 28 06/21/2024    CO2 27 02/19/2024    CO2 27 08/01/2023    ANIONGAP 6 05/30/2015    ANIONGAP 8 01/17/2015    ANIONGAP 8 10/18/2014    BUN 20 06/21/2024    BUN 12 02/19/2024    BUN 14 08/01/2023    CREATININE 0.78 06/21/2024    CREATININE 0.72 02/19/2024    CREATININE 0.69 08/01/2023     MG 2.1 07/26/2019     Lab Results   Component Value Date    WBC 7.02 06/21/2024    WBC 6.13 02/19/2024    WBC 6.80 11/22/2023    HGB 14.6 06/21/2024    HGB 14.4 02/19/2024    HGB 14.2 11/22/2023    HCT 42.5 06/21/2024    HCT 43.0 02/19/2024    HCT 41.9 11/22/2023    MCV 88 06/21/2024    MCV 87 02/19/2024    MCV 87 11/22/2023    MCH 30.2 06/21/2024    MCH 29.1 02/19/2024    MCH 29.5 11/22/2023    MCHC 34.4 06/21/2024    MCHC 33.5 02/19/2024    MCHC 33.9 11/22/2023     06/21/2024     02/19/2024     11/22/2023      Lab Results   Component Value Date    GLUCOSE 107 05/30/2015    GLUCOSE 107 01/17/2015    GLUCOSE 111 10/18/2014    CALCIUM 9.7 06/21/2024    CALCIUM 9.6 02/19/2024    CALCIUM 9.6 08/01/2023    ALB 4.4 06/21/2024    ALB 4.1 02/19/2024    ALB 4.1 03/25/2023    TP 7.3 06/21/2024    TP 7.1 02/19/2024    TP 6.9 03/25/2023    AST 19 06/21/2024    AST 21 02/19/2024    AST 20 03/25/2023    ALT 17 06/21/2024    ALT 25 02/19/2024    ALT 18 03/25/2023    ALKPHOS 86 06/21/2024    ALKPHOS 87 02/19/2024    ALKPHOS 78 03/25/2023    BILITOT 0.4 05/30/2015    BILITOT 0.7 01/17/2015    BILITOT 0.4 10/18/2014       Lab Results   Component Value Date    FREET4 1.09 03/25/2023    FREET4 1.69 (H) 07/10/2020    FREET4 1.27 10/03/2015       Lab Results   Component Value Date    CRP 22.2 (H) 10/18/2014       Current Outpatient Medications:     atorvastatin (LIPITOR) 20 mg tablet, TAKE 1 TABLET BY MOUTH EVERY DAY, Disp: 90 tablet, Rfl: 1    glucose blood test strip, Use as instructed to test blood sugar once a day, Disp: 100 strip, Rfl: 2    Lancets (OneTouch Delica Plus [id number]) MISC, TEST BLOOD SUGAR 2 TIMES DAILY, Disp: 100 each, Rfl: 3    levothyroxine 112 mcg tablet, Take 1 tablet (112 mcg total) by mouth daily in the early morning, Disp: 90 tablet, Rfl: 0    metFORMIN (GLUCOPHAGE-XR) 500 mg 24 hr tablet, TAKE 1 TABLET BY MOUTH EVERY DAY WITH DINNER, Disp: 90 tablet, Rfl: 1    omeprazole (PriLOSEC) 40 MG  capsule, TAKE 1 CAPSULE (40 MG TOTAL) BY MOUTH DAILY., Disp: 90 capsule, Rfl: 1    traZODone (DESYREL) 50 mg tablet, TAKE 1 TABLET BY MOUTH EVERYDAY AT BEDTIME, Disp: 90 tablet, Rfl: 1  No Known Allergies    Past Medical History:   Diagnosis Date    Anxiety 03/29/2019    Asthma     Cataract     viral    Chronic kidney disease     Class 1 obesity due to excess calories with serious comorbidity and body mass index (BMI) of 30.0 to 30.9 in adult 12/10/2018    Encounter for well adult exam with abnormal findings 12/10/2018    Herpes zoster without complication 03/06/2020    Hyperlipidemia     Hypertension     Hypothyroid     IFG (impaired fasting glucose) 01/14/2019    Incisional hernia     Insomnia     Kidney stone     in past    Left upper quadrant pain 07/22/2019    Lower abdominal pain 08/24/2020    Osteoarthritis     RA (rheumatoid arthritis) ([facility]) 02/24/2015    pt unsure     Type 2 diabetes mellitus without complication, without long-term current use of insulin ([facility]) 02/04/2019    NIDDM    Uses Ethiopian as primary spoken language     Varicose veins of both lower extremities     Wears dentures      upper partials    Wears glasses      Social History     Socioeconomic History    Marital status: /Civil Union     Spouse name: Not on file    Number of children: Not on file    Years of education: Not on file    Highest education level: Not on file   Occupational History    Not on file   Tobacco Use    Smoking status: Never     Passive exposure: Never    Smokeless tobacco: Never    Tobacco comments:     no passive smoke exposure   Vaping Use    Vaping status: Never Used   Substance and Sexual Activity    Alcohol use: Not Currently     Comment: liquor    Drug use: No    Sexual activity: Not Currently     Partners: Male   Other Topics Concern    Not on file   Social History Narrative    Not on file     Social Determinants of Health     Financial Resource Strain: Low Risk  (12/15/2021)    Overall Financial Resource  Strain (CARDIA)     Difficulty of Paying Living Expenses: Not hard at all   Food Insecurity: No Food Insecurity (8/2/2024)    Hunger Vital Sign     Worried About Running Out of Food in the Last Year: Never true     Ran Out of Food in the Last Year: Never true   Transportation Needs: No Transportation Needs (8/2/2024)    PRAPARE - Transportation     Lack of Transportation (Medical): No     Lack of Transportation (Non-Medical): No   Physical Activity: Sufficiently Active (12/15/2021)    Exercise Vital Sign     Days of Exercise per Week: 3 days     Minutes of Exercise per Session: 60 min   Stress: No Stress Concern Present (12/15/2021)    [employer] [place] [employer] - Occupational Stress Questionnaire     Feeling of Stress : Not at all   Social Connections: Moderately Integrated (12/15/2021)    Social Connection and Isolation Panel [NHANES]     Frequency of Communication with Friends and Family: More than three times a week     Frequency of Social Gatherings with Friends and Family: More than three times a week     Attends Sikhism Services: More than 4 times per year     Active Member of Clubs or Organizations: No     Attends Club or Organization Meetings: Never     Marital Status:    Intimate Partner Violence: Not At Risk (12/15/2021)    Humiliation, Afraid, Rape, and Kick questionnaire     Fear of Current or Ex-Partner: No     Emotionally Abused: No     Physically Abused: No     Sexually Abused: No   Housing Stability: Low Risk  (8/2/2024)    Housing Stability Vital Sign     Unable to Pay for Housing in the Last Year: No     Number of Times Moved in the Last Year: 1     Homeless in the Last Year: No      Family History   Problem Relation Age of Onset    Heart disease Mother     Emphysema Mother     COPD Father     Heart disease Father     No Known Problems Sister     No Known Problems Sister     No Known Problems Sister     No Known Problems Sister     No Known Problems Daughter     No Known  Problems Maternal Grandmother     No Known Problems Maternal Grandfather     No Known Problems Paternal Grandmother     No Known Problems Paternal Grandfather     No Known Problems Maternal Aunt     No Known Problems Maternal Aunt     No Known Problems Paternal Aunt     Breast cancer Neg Hx      Past Surgical History:   Procedure Laterality Date    APPENDECTOMY      BREAST BIOPSY Right 1992    negative     SECTION      x 3    CHOLECYSTECTOMY      COLONOSCOPY      EYE SURGERY      HERNIA REPAIR      HYSTERECTOMY      TX LAP RPR HRNA XCPT INCAL/INGUN NCRC8/STRANGULATED N/A 2019    Procedure: REPAIR HERNIA INCISIONAL LAPAROSCOPIC W/ ROBOTICS;  Surgeon: Sadia Sanchez MD;  Location: [facility];  Service: General    UPPER GASTROINTESTINAL ENDOSCOPY         PREVIOUS WEIGHTS:   Wt Readings from Last 10 Encounters:   24 77.1 kg (170 lb)   24 76.4 kg (168 lb 6.4 oz)   24 76.2 kg (168 lb)   24 76.2 kg (168 lb)   24 76.8 kg (169 lb 6.4 oz)   24 76.7 kg (169 lb)   24 76.7 kg (169 lb 3.2 oz)   24 77.2 kg (170 lb 3.2 oz)   23 75.9 kg (167 lb 6.4 oz)   23 77.5 kg (170 lb 12.8 oz)        Review of Systems:  Review of Systems   Respiratory:  Negative for cough, choking, chest tightness, shortness of breath and wheezing.    Cardiovascular:  Negative for chest pain, palpitations and leg swelling.   Musculoskeletal:  Negative for gait problem.   Skin:  Negative for rash.   Neurological:  Negative for dizziness, tremors, syncope, weakness, light-headedness, numbness and headaches.   Psychiatric/Behavioral:  Negative for agitation and behavioral problems. The patient is not hyperactive.        Physical Exam:  /70 (BP Location: Left arm, Patient Position: Sitting, Cuff Size: Adult)   Pulse 76   Wt 77.1 kg (170 lb)   BMI 29.64 kg/m²     Physical Exam  Constitutional:       General: She is not in acute distress.     Appearance: She is well-developed.  "  HENT:      Head: Normocephalic and atraumatic.   Neck:      Thyroid: No thyromegaly.      Vascular: No carotid bruit or JVD.      Trachea: No tracheal deviation.   Cardiovascular:      Rate and Rhythm: Normal rate and regular rhythm.      Pulses: Normal pulses.      Heart sounds: Normal heart sounds. No murmur heard.     No friction rub. No gallop.   Pulmonary:      Effort: Pulmonary effort is normal. No respiratory distress.      Breath sounds: Normal breath sounds. No wheezing, rhonchi or rales.   Chest:      Chest wall: No tenderness.   Musculoskeletal:         General: Normal range of motion.      Cervical back: Normal range of motion and neck supple.      Right lower leg: No edema.      Left lower leg: No edema.   Skin:     General: Skin is warm and dry.   Neurological:      General: No focal deficit present.      Mental Status: She is alert and oriented to person, place, and time.      Gait: Gait normal.   Psychiatric:         Mood and Affect: Mood normal.         Behavior: Behavior normal.         Thought Content: Thought content normal.         Judgment: Judgment normal.       ======================================================  Imaging:   I have personally reviewed pertinent reports.      Portions of the record may have been created with voice recognition software. Occasional wrong word or \"sound a like\" substitutions may have occurred due to the inherent limitations of voice recognition software. Read the chart carefully and recognize, using context, where substitutions have occurred.    SIGNATURES:   Harry Quach MD   "

## 2024-09-04 PROBLEM — Z00.00 MEDICARE WELCOME EXAM: Status: RESOLVED | Noted: 2024-08-05 | Resolved: 2024-09-04

## 2024-09-23 ENCOUNTER — APPOINTMENT (OUTPATIENT)
Dept: LAB | Facility: HOSPITAL | Age: 64
End: 2024-09-23
Payer: MEDICARE

## 2024-09-23 DIAGNOSIS — E78.2 MIXED HYPERLIPIDEMIA: ICD-10-CM

## 2024-09-23 DIAGNOSIS — E11.9 TYPE 2 DIABETES MELLITUS WITHOUT COMPLICATION, WITHOUT LONG-TERM CURRENT USE OF INSULIN (HCC): ICD-10-CM

## 2024-09-23 DIAGNOSIS — I10 ESSENTIAL HYPERTENSION: ICD-10-CM

## 2024-09-23 DIAGNOSIS — E03.9 ACQUIRED HYPOTHYROIDISM: ICD-10-CM

## 2024-09-23 LAB
ANION GAP SERPL CALCULATED.3IONS-SCNC: 5 MMOL/L (ref 4–13)
BASOPHILS # BLD AUTO: 0.04 THOUSANDS/ΜL (ref 0–0.1)
BASOPHILS NFR BLD AUTO: 1 % (ref 0–1)
BUN SERPL-MCNC: 13 MG/DL (ref 5–25)
CALCIUM SERPL-MCNC: 9.5 MG/DL (ref 8.4–10.2)
CHLORIDE SERPL-SCNC: 106 MMOL/L (ref 96–108)
CHOLEST SERPL-MCNC: 150 MG/DL
CO2 SERPL-SCNC: 29 MMOL/L (ref 21–32)
CREAT SERPL-MCNC: 0.69 MG/DL (ref 0.6–1.3)
EOSINOPHIL # BLD AUTO: 0.3 THOUSAND/ΜL (ref 0–0.61)
EOSINOPHIL NFR BLD AUTO: 4 % (ref 0–6)
ERYTHROCYTE [DISTWIDTH] IN BLOOD BY AUTOMATED COUNT: 12.1 % (ref 11.6–15.1)
EST. AVERAGE GLUCOSE BLD GHB EST-MCNC: 154 MG/DL
GFR SERPL CREATININE-BSD FRML MDRD: 91 ML/MIN/1.73SQ M
GLUCOSE P FAST SERPL-MCNC: 137 MG/DL (ref 65–99)
HBA1C MFR BLD: 7 %
HCT VFR BLD AUTO: 41.9 % (ref 34.8–46.1)
HDLC SERPL-MCNC: 47 MG/DL
HGB BLD-MCNC: 14.1 G/DL (ref 11.5–15.4)
IMM GRANULOCYTES # BLD AUTO: 0.01 THOUSAND/UL (ref 0–0.2)
IMM GRANULOCYTES NFR BLD AUTO: 0 % (ref 0–2)
LDLC SERPL CALC-MCNC: 82 MG/DL (ref 0–100)
LYMPHOCYTES # BLD AUTO: 3.07 THOUSANDS/ΜL (ref 0.6–4.47)
LYMPHOCYTES NFR BLD AUTO: 42 % (ref 14–44)
MCH RBC QN AUTO: 30.1 PG (ref 26.8–34.3)
MCHC RBC AUTO-ENTMCNC: 33.7 G/DL (ref 31.4–37.4)
MCV RBC AUTO: 90 FL (ref 82–98)
MONOCYTES # BLD AUTO: 0.46 THOUSAND/ΜL (ref 0.17–1.22)
MONOCYTES NFR BLD AUTO: 6 % (ref 4–12)
NEUTROPHILS # BLD AUTO: 3.42 THOUSANDS/ΜL (ref 1.85–7.62)
NEUTS SEG NFR BLD AUTO: 47 % (ref 43–75)
NRBC BLD AUTO-RTO: 0 /100 WBCS
PLATELET # BLD AUTO: 230 THOUSANDS/UL (ref 149–390)
PMV BLD AUTO: 11.3 FL (ref 8.9–12.7)
POTASSIUM SERPL-SCNC: 3.9 MMOL/L (ref 3.5–5.3)
RBC # BLD AUTO: 4.68 MILLION/UL (ref 3.81–5.12)
SODIUM SERPL-SCNC: 140 MMOL/L (ref 135–147)
TRIGL SERPL-MCNC: 105 MG/DL
TSH SERPL DL<=0.05 MIU/L-ACNC: 1.97 UIU/ML (ref 0.45–4.5)
WBC # BLD AUTO: 7.3 THOUSAND/UL (ref 4.31–10.16)

## 2024-09-23 PROCEDURE — 36415 COLL VENOUS BLD VENIPUNCTURE: CPT

## 2024-09-23 PROCEDURE — 84443 ASSAY THYROID STIM HORMONE: CPT

## 2024-09-23 PROCEDURE — 80061 LIPID PANEL: CPT

## 2024-09-23 PROCEDURE — 85025 COMPLETE CBC W/AUTO DIFF WBC: CPT

## 2024-09-23 PROCEDURE — 80048 BASIC METABOLIC PNL TOTAL CA: CPT

## 2024-09-23 PROCEDURE — 83036 HEMOGLOBIN GLYCOSYLATED A1C: CPT

## 2024-09-25 ENCOUNTER — OFFICE VISIT (OUTPATIENT)
Dept: FAMILY MEDICINE CLINIC | Facility: CLINIC | Age: 64
End: 2024-09-25
Payer: MEDICARE

## 2024-09-25 ENCOUNTER — CLINICAL SUPPORT (OUTPATIENT)
Dept: CARDIOLOGY CLINIC | Facility: CLINIC | Age: 64
End: 2024-09-25

## 2024-09-25 VITALS
BODY MASS INDEX: 28.65 KG/M2 | HEART RATE: 81 BPM | TEMPERATURE: 96.5 F | OXYGEN SATURATION: 98 % | WEIGHT: 167.8 LBS | HEIGHT: 64 IN | SYSTOLIC BLOOD PRESSURE: 110 MMHG | DIASTOLIC BLOOD PRESSURE: 78 MMHG

## 2024-09-25 DIAGNOSIS — E11.9 TYPE 2 DIABETES MELLITUS WITHOUT COMPLICATION, WITHOUT LONG-TERM CURRENT USE OF INSULIN (HCC): ICD-10-CM

## 2024-09-25 DIAGNOSIS — N18.2 TYPE 2 DIABETES MELLITUS WITH STAGE 2 CHRONIC KIDNEY DISEASE, WITHOUT LONG-TERM CURRENT USE OF INSULIN  (HCC): ICD-10-CM

## 2024-09-25 DIAGNOSIS — E11.22 TYPE 2 DIABETES MELLITUS WITH STAGE 2 CHRONIC KIDNEY DISEASE, WITHOUT LONG-TERM CURRENT USE OF INSULIN  (HCC): ICD-10-CM

## 2024-09-25 DIAGNOSIS — E11.22 TYPE 2 DIABETES MELLITUS WITH STAGE 2 CHRONIC KIDNEY DISEASE, WITHOUT LONG-TERM CURRENT USE OF INSULIN  (HCC): Primary | ICD-10-CM

## 2024-09-25 DIAGNOSIS — I47.10 SVT (SUPRAVENTRICULAR TACHYCARDIA) (HCC): ICD-10-CM

## 2024-09-25 DIAGNOSIS — E78.00 PURE HYPERCHOLESTEROLEMIA: ICD-10-CM

## 2024-09-25 DIAGNOSIS — E03.9 ACQUIRED HYPOTHYROIDISM: ICD-10-CM

## 2024-09-25 DIAGNOSIS — J45.20 MILD INTERMITTENT ASTHMA WITHOUT COMPLICATION: ICD-10-CM

## 2024-09-25 DIAGNOSIS — N18.2 TYPE 2 DIABETES MELLITUS WITH STAGE 2 CHRONIC KIDNEY DISEASE, WITHOUT LONG-TERM CURRENT USE OF INSULIN  (HCC): Primary | ICD-10-CM

## 2024-09-25 DIAGNOSIS — G47.09 OTHER INSOMNIA: ICD-10-CM

## 2024-09-25 DIAGNOSIS — Z13.220 SCREENING, LIPID: ICD-10-CM

## 2024-09-25 PROBLEM — R10.12 LUQ PAIN: Status: RESOLVED | Noted: 2022-07-18 | Resolved: 2024-09-25

## 2024-09-25 PROCEDURE — 99214 OFFICE O/P EST MOD 30 MIN: CPT | Performed by: FAMILY MEDICINE

## 2024-09-25 PROCEDURE — G2211 COMPLEX E/M VISIT ADD ON: HCPCS | Performed by: FAMILY MEDICINE

## 2024-09-25 RX ORDER — ALBUTEROL SULFATE 90 UG/1
2 INHALANT RESPIRATORY (INHALATION) EVERY 6 HOURS PRN
Qty: 18 G | Refills: 1 | Status: SHIPPED | OUTPATIENT
Start: 2024-09-25

## 2024-09-26 RX ORDER — BLOOD SUGAR DIAGNOSTIC
STRIP MISCELLANEOUS
Qty: 100 STRIP | Refills: 2 | Status: SHIPPED | OUTPATIENT
Start: 2024-09-26

## 2024-09-26 NOTE — ASSESSMENT & PLAN NOTE
Lab Results   Component Value Date    HGBA1C 7.0 (H) 09/23/2024   Chronic increase in the hemoglobin A1c compared with before patient supposed to be on metformin extended release 2 tablets daily she has been taking just 1 tablet a day discussed important compliant with the medication and to take 500 mg 2 tablet daily low-carb diet review    Orders:    CBC and differential; Future    Comprehensive metabolic panel; Future    Hemoglobin A1C; Future    Albumin / creatinine urine ratio; Future    glucose blood test strip; Use as instructed

## 2024-09-26 NOTE — ASSESSMENT & PLAN NOTE
Chronic symptomatic patient on trazodone 50 mg once a day and feels is not helping make her drowsy secondary recommend to take it sooner and do not wait until late night recommend to be evaluated by sleep specialist    Orders:    CBC and differential; Future    Comprehensive metabolic panel; Future

## 2024-09-26 NOTE — ASSESSMENT & PLAN NOTE
Chronic asymptomatic no recent exacerbation or hospitalization refill and albuterol inhaler discussed with the patient    Orders:    albuterol (Ventolin HFA) 90 mcg/act inhaler; Inhale 2 puffs every 6 (six) hours as needed for wheezing    CBC and differential; Future    Comprehensive metabolic panel; Future

## 2024-09-26 NOTE — ASSESSMENT & PLAN NOTE
Orders:    CBC and differential; Future    Comprehensive metabolic panel; Future     70M pmhx DM who presents for evaluation of right foot pain and fall with posterior head injury and possible LOC. Pt denies vomiting, chest pain, sob or preceding dizziness. States he lost balance while coming down the last step and reaching for object. Reports pain with ambulation. States felt right foot twist before falling. No blood thinner use  On exam tenderness right foot pain, ttp occipital area  CT head rule out ICH, xray foot /ankle rule out fracture.

## 2024-09-26 NOTE — PROGRESS NOTES
Ambulatory Visit  Name: Esha Oh      : 1960      MRN: [id number]  Encounter Provider: Shirley Almonte MD  Encounter Date: 2024   Encounter department: [facility]      Assessment & Plan  Type 2 diabetes mellitus without complication, without long-term current use of insulin ([facility])    Lab Results   Component Value Date    HGBA1C 7.0 (H) 2024       Orders:    CBC and differential; Future    Comprehensive metabolic panel; Future    Type 2 diabetes mellitus with stage 2 chronic kidney disease, without long-term current use of insulin  (HCC)    Lab Results   Component Value Date    HGBA1C 7.0 (H) 2024   Chronic increase in the hemoglobin A1c compared with before patient supposed to be on metformin extended release 2 tablets daily she has been taking just 1 tablet a day discussed important compliant with the medication and to take 500 mg 2 tablet daily low-carb diet review    Orders:    CBC and differential; Future    Comprehensive metabolic panel; Future    Hemoglobin A1C; Future    Albumin / creatinine urine ratio; Future    glucose blood test strip; Use as instructed    Other insomnia  Chronic symptomatic patient on trazodone 50 mg once a day and feels is not helping make her drowsy secondary recommend to take it sooner and do not wait until late night recommend to be evaluated by sleep specialist    Orders:    CBC and differential; Future    Comprehensive metabolic panel; Future    Mild intermittent asthma without complication  Chronic asymptomatic no recent exacerbation or hospitalization refill and albuterol inhaler discussed with the patient    Orders:    albuterol (Ventolin HFA) 90 mcg/act inhaler; Inhale 2 puffs every 6 (six) hours as needed for wheezing    CBC and differential; Future    Comprehensive metabolic panel; Future    Acquired hypothyroidism  Chronic asymptomatic fair control continue the levothyroxine 112 mcg once a day proper use  "and possible side effect review    Orders:    CBC and differential; Future    Comprehensive metabolic panel; Future    TSH, 3rd generation with Free T4 reflex; Future    Pure hypercholesterolemia    Orders:    CBC and differential; Future    Comprehensive metabolic panel; Future    Screening, lipid    Orders:    Lipid Panel with Direct LDL reflex; Future         History of Present Illness     Patient here follow-up with a chronic condition patient history of non-insulin-dependent diabetic on metformin extended release 500 mg once a day recent blood work showed increase in the hemoglobin A1c compared with before when you review medication with the patient she has not been taking it just 1 tablet daily instead of 2 tablets a day as prescribed.  Patient not compliant with a low-carb diet patient who known to have a safe insomnia she been taking the trazodone 50 mg duple patient did make her tired and drowsy  secondary patient history of asthma will use albuterol inhaler no cough no wheezing no hematosis  recent blood work review      Review of Systems   Constitutional:  Negative for chills and fever.   HENT:  Negative for ear pain and sore throat.    Eyes:  Negative for pain and visual disturbance.   Respiratory:  Negative for cough and shortness of breath.    Cardiovascular:  Negative for chest pain and palpitations.   Gastrointestinal:  Negative for abdominal pain, constipation, diarrhea and vomiting.   Genitourinary:  Negative for dysuria and hematuria.   Musculoskeletal:  Negative for arthralgias and back pain.   Skin:  Negative for color change and rash.   Neurological:  Negative for seizures and syncope.   Psychiatric/Behavioral:  Positive for sleep disturbance.    All other systems reviewed and are negative.    Objective     /78 (BP Location: Left arm, Patient Position: Sitting)   Pulse 81   Temp (!) 96.5 °F (35.8 °C) (Tympanic)   Ht 5' 3.5\" (1.613 m)   Wt 76.1 kg (167 lb 12.8 oz)   SpO2 98%   BMI " 29.26 kg/m²     Physical Exam  Vitals and nursing note reviewed.   Constitutional:       General: She is not in acute distress.     Appearance: She is well-developed. She is not diaphoretic.   HENT:      Head: Normocephalic.      Right Ear: Tympanic membrane and external ear normal.      Left Ear: Tympanic membrane and external ear normal.      Nose: No rhinorrhea.      Mouth/Throat:      Pharynx: No posterior oropharyngeal erythema.   Eyes:      General:         Right eye: No discharge.         Left eye: No discharge.      Conjunctiva/sclera: Conjunctivae normal.   Neck:      Vascular: No JVD.   Cardiovascular:      Rate and Rhythm: Normal rate and regular rhythm.      Heart sounds: Normal heart sounds. No murmur heard.     No gallop.   Pulmonary:      Effort: Pulmonary effort is normal. No respiratory distress.      Breath sounds: Normal breath sounds. No wheezing.   Abdominal:      General: There is no distension.      Palpations: Abdomen is soft.      Tenderness: There is no abdominal tenderness. There is no rebound.   Musculoskeletal:         General: No tenderness.      Cervical back: Normal range of motion and neck supple.   Lymphadenopathy:      Cervical: No cervical adenopathy.   Skin:     General: Skin is warm.      Findings: No rash.   Neurological:      Mental Status: She is alert and oriented to person, place, and time.         Shirley Almonte MD

## 2024-09-26 NOTE — ASSESSMENT & PLAN NOTE
Chronic asymptomatic fair control continue the levothyroxine 112 mcg once a day proper use and possible side effect review    Orders:    CBC and differential; Future    Comprehensive metabolic panel; Future    TSH, 3rd generation with Free T4 reflex; Future

## 2024-10-26 NOTE — ASSESSMENT & PLAN NOTE
3/22/2024  120/74  6/25/2024 110/70  8/2/2024 130/68  8/26/24  120/70  9/25/2024 110/78      Controlled on no medications

## 2024-10-26 NOTE — ASSESSMENT & PLAN NOTE
Lab Results   Component Value Date    HGBA1C 7.0 (H) 09/23/2024     Defer management to PCP.  GFR has improved to 91 and he no longer has stage II CKD

## 2024-10-26 NOTE — ASSESSMENT & PLAN NOTE
Patient has symptoms of palpitations mainly when she lays down at night although sometimes it also occurs during the day.  Her heart feels like it is pounding.  She does have some shortness of breath with these palpitations but it may be secondary to anxiety.  She is concerned that she might have a heart attack or even die.  These episodes last for 5 or 10 minutes and then giuliano    She had a 48 hr holter monitor performed which demonstrates a HR ranged from 43 to 136 bpm with 12 episodes of nonsustained supraventricular tachycardia with the longest run lasting for 19 beats at a heart rate of 150 bpm.  Treating this SVT with a heart rate at times as low as 43 bpm represents a problem.  I do do not feel that her rhythm as it presently is warrant a pacemaker to treat the supraventricular tachycardia.    9/25/2024 Ziopatch monitor:  --Predominant underlying rhythm was Sinus Rhythm at a min HR of 46 bpm, max HR of 119 bpm, and avg HR of 74 bpm..   --1 run of Ventricular Tachycardia occurred lasting 5 beats with a max rate of 197 bpm (avg 184 bpm). -- 60 Supraventricular Tachycardia runs occurred, the run with the fastest interval lasting 11.0 secs with a max rate of 203 bpm (avg 165 bpm); the run with the fastest interval was also the longest.

## 2024-10-26 NOTE — ASSESSMENT & PLAN NOTE
Lab Results   Component Value Date    CHOLESTEROL 150 09/23/2024     Lab Results   Component Value Date    TRIG 105 09/23/2024     Lab Results   Component Value Date    HDL 47 (L) 09/23/2024     Lab Results   Component Value Date    LDLCALC 82 09/23/2024     Atorvastatin 20 mg daily

## 2024-10-29 ENCOUNTER — OFFICE VISIT (OUTPATIENT)
Dept: PHYSICAL THERAPY | Facility: CLINIC | Age: 64
End: 2024-10-29
Payer: MEDICARE

## 2024-10-29 DIAGNOSIS — M54.2 ACUTE NECK PAIN: Primary | ICD-10-CM

## 2024-10-29 PROCEDURE — 97112 NEUROMUSCULAR REEDUCATION: CPT | Performed by: PHYSICAL THERAPIST

## 2024-10-29 PROCEDURE — 97010 HOT OR COLD PACKS THERAPY: CPT | Performed by: PHYSICAL THERAPIST

## 2024-10-29 PROCEDURE — 97161 PT EVAL LOW COMPLEX 20 MIN: CPT | Performed by: PHYSICAL THERAPIST

## 2024-10-29 NOTE — PROGRESS NOTES
PT Evaluation     Today's date: 10/29/2024  Patient name: Esha Oh  : 1960  MRN: [id number]  Referring provider: Shirley Almonte MD  Dx:   Encounter Diagnosis     ICD-10-CM    1. Acute neck pain  M54.2           Start Time: 905  Stop Time: 956  Total time in clinic (min): 51 minutes    Assessment  Impairments: abnormal coordination, abnormal muscle tone, abnormal or restricted ROM, abnormal movement, activity intolerance, impaired physical strength, lacks appropriate home exercise program, pain with function, poor posture , unable to perform ADL, participation limitations, activity limitations and endurance  Symptom irritability: high    Assessment details: Pt is a 64 y.o. year old female presenting to physical therapy via direct access for acute neck pain. She presents with the following impairments: high irritability, pain with cervical movement, hypomobile upper cervical spine, suboccipital hypertonicity and TTP, poor posture, and DNF endurance limitations affecting their function with looking over shoulder, looking up, sleeping, and driving.  Pt will benefit from skilled physical therapy to address functional limitations noted in evaluation and meet patient goals.    Evaluation completed by SUJATHA Sanchez under supervision from Fito Rodriguez DPT.   Understanding of Dx/Px/POC: good     Prognosis: good    Goals  STG  1.Patient will demonstrate independence with HEP  2.Patient will improve cervical ROM to WFL  3.Patient will improve pain levels to 2/10 at worst  LTG  1.Patient will meet expected FOTO score  2.Patient will be able to look over shoulder without pain  3. Patient will be able to sleep for 6 hours without increase in pain    Plan  Patient would benefit from: PT eval and skilled physical therapy  Planned modality interventions: thermotherapy: hydrocollator packs, TENS and cryotherapy    Planned therapy interventions: joint mobilization, manual therapy, flexibility, functional ROM  exercises, home exercise program, therapeutic exercise, therapeutic activities, stretching, strengthening, postural training, patient/caregiver education and neuromuscular re-education    Frequency: 1x week  Duration in weeks: 1  Plan details: Patient reports travelling from  to , plan to schedule once this week prior to travelling. Re-evaluate after trip if appropriate.        Subjective Evaluation    History of Present Illness  Mechanism of injury: Patient reports intense pain that started this morning after waking up. Pain worsens when looking up/down and rotation side to side. Patient reports sleeping in all different positions. She has not tried taking pain medication or using modalities to reduce pain. She reports pain local to R neck without radiating symptoms into arms. Patient reports no previous injury to neck, denies hx of head or neck trauma. Patient denies headaches, denies migraines, denies visual changes, denies visual changes, denies episodes of syncope.  Pain  Current pain ratin  Relieving factors: relaxation, rest, ice and heat          Objective     Concurrent Complaints  Negative for faints, headaches and visual change    Postural Observations  Seated posture: poor  Standing posture: poor      Palpation   Left   No palpable tenderness to the deltoid, sternocleidomastoid and upper trapezius.   Hypertonic in the suboccipitals and upper trapezius.   Tenderness of the suboccipitals.     Right   No palpable tenderness to the deltoid, sternocleidomastoid and upper trapezius.   Hypertonic in the suboccipitals and upper trapezius.   Tenderness of the suboccipitals.     Additional Palpation Details  Unable to further assess palpation due to high irritability     Tenderness   Cervical Spine   No tenderness in the spinous process.     Active Range of Motion   Cervical/Thoracic Spine       Cervical    Flexion:  WFL and with pain  Extension:  with pain Restriction level:  "moderate  Left lateral flexion:  with pain Restriction level: moderate  Right lateral flexion:  with pain Restriction level moderate  Left rotation:  with pain Restriction level: minimal  Right rotation:  with pain Restriction level: moderate    Passive Range of Motion   Cervical/Thoracic Spine   Cervical     Flexion (degrees):  WFL and with pain  Left lateral flexion (degrees):  Restriction level: minimal  Right lateral flexion (degrees):  Restriction level: minimal  Left rotation (degrees):  with pain Restriction level: moderate  Right rotation (degrees):  with pain Restriction level: minimal    Joint Play   Joints within functional limits: C4, C5, C6 and C7     Hypomobile: C1, C2 and C3     Strength/Myotome Testing     Left Shoulder     Planes of Motion   Flexion: 4-   Abduction: 4-   External rotation at 0°: 4   Internal rotation at 0°: 4+     Right Shoulder     Planes of Motion   Flexion: 3+   Abduction: 4-   External rotation at 0°: 4   Internal rotation at 0°: 4+     Left Elbow   Flexion: 4+  Extension: 4+    Right Elbow   Flexion: 4+  Extension: 4+    Tests   Cervical   Negative cervical distraction and neck flexor muscle endurance test.     Left   Positive Spurling's Test A.     Right   Positive Spurling's Test A.     Left Shoulder   Positive ULTT1.     Right Shoulder   Negative ULTT1.     Additional Tests Details  DNF endurance: 40 sec  Neuro Exam:     Headaches   Patient reports headaches: No.       Flowsheet Rows      Flowsheet Row Most Recent Value   PT/OT G-Codes    Current Score 38   Projected Score 66               Precautions: N/A      Date 10/29            Visit # IE            FOTO IE             Re-eval IE                 Manuals 10/29            Cervical PROM ND pn! Into L rot            Cervical upglides             UT/LS str and STM ND UT                         Neuro Re-Ed 10/29            Chin tuck 5x5\"            Scap retract 10x5\"            DNF endurance 5x15\"            No moneys         "     Cervical Isometrics             Snags                          Ther Ex 10/29            UT/LS str             Pec str                                                                                           Ther Activity 10/29                                      Gait Training 10/29                                      Modalities

## 2024-10-30 ENCOUNTER — OFFICE VISIT (OUTPATIENT)
Dept: CARDIOLOGY CLINIC | Facility: CLINIC | Age: 64
End: 2024-10-30
Payer: MEDICARE

## 2024-10-30 VITALS
HEART RATE: 72 BPM | BODY MASS INDEX: 28.27 KG/M2 | WEIGHT: 165.6 LBS | DIASTOLIC BLOOD PRESSURE: 62 MMHG | HEIGHT: 64 IN | SYSTOLIC BLOOD PRESSURE: 112 MMHG | OXYGEN SATURATION: 100 %

## 2024-10-30 DIAGNOSIS — I47.10 SVT (SUPRAVENTRICULAR TACHYCARDIA) (HCC): Primary | ICD-10-CM

## 2024-10-30 DIAGNOSIS — I10 ESSENTIAL HYPERTENSION: ICD-10-CM

## 2024-10-30 DIAGNOSIS — E11.9 TYPE 2 DIABETES MELLITUS WITHOUT COMPLICATION, WITHOUT LONG-TERM CURRENT USE OF INSULIN (HCC): ICD-10-CM

## 2024-10-30 DIAGNOSIS — E78.00 PURE HYPERCHOLESTEROLEMIA: ICD-10-CM

## 2024-10-30 PROCEDURE — 99214 OFFICE O/P EST MOD 30 MIN: CPT | Performed by: INTERNAL MEDICINE

## 2024-10-30 RX ORDER — METOPROLOL SUCCINATE 25 MG/1
25 TABLET, EXTENDED RELEASE ORAL DAILY
Qty: 30 TABLET | Refills: 5 | Status: SHIPPED | OUTPATIENT
Start: 2024-10-30

## 2024-10-30 NOTE — PROGRESS NOTES
[facility]  [address] [place] [address]  Phone#  [phone]   Fax#  [phone]  *-*-*-*-*-*-*-*-*-*-*-*-*-*-*-*-*-*-*-*-*-*-*-*-*-*-*-*-*-*-*-*-*-*-*-*-*-*-*-*-*-*-*-*-*-*-*-*-*-*-*-*-*-*                                   Cardiology Follow Up      ENCOUNTER DATE: 10/30/24 8:23 AM  PATIENT NAME: Esha Oh   : 1960    MRN: [id number]  AGE:64 y.o.      SEX: female  ENCOUNTER PROVIDER:Harry Quach MD     PRIMARY CARE PHYSICIAN: Shirley Almonte MD    CARDIOLOGY SPECIALTY COMMENTS  Patient referred to cardiology by  Shirley Almonte MD for palpitations and shortness of breath.     Patient has symptoms of palpitations mainly when she lays down at night although sometimes it also occurs during the day.  Her heart feels like it is pounding.  She does have some shortness of breath with these palpitations but it may be secondary to anxiety.  She is concerned that she might have a heart attack or even die.  These episodes last for 5 or 10 minutes and then giuliano.     She had a 48 hr holter monitor performed which demonstrates a HR ranged from 43 to 136 bpm with 12 episodes of nonsustained supraventricular tachycardia with the longest run lasting for 19 beats at a heart rate of 150 bpm.  Treating this SVT with a heart rate at times as low as 43 bpm represents a problem.  I do do not feel that her rhythm as it presently is warrant a pacemaker to treat the supraventricular tachycardia.    2023 stress echo: Nondiagnostic for ischemia due to failure of patient to reach target heart rate.  Maximum heart rate was 78% of MPHR.  Resting EF 60% with grade 1 diastolic dysfunction.  Severely impaired exercise capacity due to fatigue and shortness of breath.  Stage 2 hypertension at rest.  Normal ECG.    2023 48-hour Holter monitor: Heart rate ranged from 43 to 136 bpm and average 71 bpm.  48 ventricular ectopic beats.  177 supraventricular ectopic beats.  12 nonsustained atrial  runs longest lasting 19 beats at a heart rate of 150 bpm.  Longest R to R interval 1.5 seconds.  No significant bradycardia or heart block.    9/25/2024 Ziopatch monitor:  --Predominant underlying rhythm was Sinus Rhythm at a min HR of 46 bpm, max HR of 119 bpm, and avg HR of 74 bpm..   --1 run of Ventricular Tachycardia occurred lasting 5 beats with a max rate of 197 bpm (avg 184 bpm). -- 60 Supraventricular Tachycardia runs occurred, the run with the fastest interval lasting 11.0 secs with a max rate of 203 bpm (avg 165 bpm); the run with the fastest interval was also the longest.     ACTIVE PROBLEM LIST  Patient Active Problem List   Diagnosis    Mild intermittent asthma without complication    Cervical myofascial pain syndrome    Cervical stenosis of spinal canal    Degeneration of lumbar intervertebral disc    Encounter for monitoring chronic NSAID therapy    Essential hypertension    Hypothyroidism    Gastritis due to nonsteroidal anti-inflammatory drug    Kidney stone    Pure hypercholesterolemia    Neuralgia    Osteoarthritis    Primary osteoarthritis of left knee    Vitamin D deficiency    Immunization refused    Overweight (BMI 25.0-29.9)    Weakness of right arm    LUIS positive    Type 2 diabetes mellitus without complication, without long-term current use of insulin (HCC)    Snoring    Other insomnia    Anxiety    Herpes zoster without complication    Vertigo    Other microscopic hematuria    Varicose veins of left lower extremity with pain    Chronic pain of right knee    Breast pain, left    Pain of left lower extremity    First degree burn    Generalized abdominal pain    Pre-op examination    Hepatic steatosis    Cataracta    Situational anxiety    Left hip pain    Leiomyoma of uterus    Chronic pain of left knee    Palpitation    SOB (shortness of breath)    Blood in stool    Discogenic cervical pain    Acute diverticulitis    Post-menopause    Abnormal mammogram    Chronic left-sided low back pain  without sciatica    SVT (supraventricular tachycardia) (HCC)       ACTIVE DIAGNOSIS AND PLAN    1. SVT (supraventricular tachycardia) ([facility])  Assessment & Plan:  Patient has symptoms of palpitations mainly when she lays down at night although sometimes it also occurs during the day.  Her heart feels like it is pounding.  She does have some shortness of breath with these palpitations but it may be secondary to anxiety.  She is concerned that she might have a heart attack or even die.  These episodes last for 5 or 10 minutes and then giuliano    She had a 48 hr holter monitor performed which demonstrates a HR ranged from 43 to 136 bpm with 12 episodes of nonsustained supraventricular tachycardia with the longest run lasting for 19 beats at a heart rate of 150 bpm.  Treating this SVT with a heart rate at times as low as 43 bpm represents a problem.  I do do not feel that her rhythm as it presently is warrant a pacemaker to treat the supraventricular tachycardia.    9/25/2024 Ziopatch monitor:  --Predominant underlying rhythm was Sinus Rhythm at a min HR of 46 bpm, max HR of 119 bpm, and avg HR of 74 bpm..   --1 run of Ventricular Tachycardia occurred lasting 5 beats with a max rate of 197 bpm (avg 184 bpm). -- 60 Supraventricular Tachycardia runs occurred, the run with the fastest interval lasting 11.0 secs with a max rate of 203 bpm (avg 165 bpm); the run with the fastest interval was also the longest.   Orders:  -     metoprolol succinate (TOPROL-XL) 25 mg 24 hr tablet; Take 1 tablet (25 mg total) by mouth daily  2. Pure hypercholesterolemia  Assessment & Plan:  Lab Results   Component Value Date    CHOLESTEROL 150 09/23/2024     Lab Results   Component Value Date    TRIG 105 09/23/2024     Lab Results   Component Value Date    HDL 47 (L) 09/23/2024     Lab Results   Component Value Date    LDLCALC 82 09/23/2024     Atorvastatin 20 mg daily  3. Essential hypertension  Assessment & Plan:  3/22/2024   120/74  6/25/2024 110/70  8/2/2024 130/68  8/26/24  120/70  9/25/2024 110/78      Controlled on no medications    4. Type 2 diabetes mellitus without complication, without long-term current use of insulin ([facility])  Assessment & Plan:    Lab Results   Component Value Date    HGBA1C 7.0 (H) 09/23/2024     Defer management to PCP.  GFR has improved to 91 and he no longer has stage II CKD       INTERVAL HISTORY:        Patient has palpitations mostly at night.  They often wake her up from her sleep.  She does not feel them during the day.  The Zio patch monitor demonstrated 60 runs of supraventricular tachycardia with the longest run lasting for 11 seconds.  The average heart rate was 165 bpm and the maximum rate was 203 bpm.  She had 1 run of ventricular tachycardia for only 5 beats at an average rate of 184 bpm.    Explained to her that these palpitations are not serious but with the medication we can maybe make them less frequent.  Told her there is nothing that she needs to worry about it is just a nuisance.    Her blood pressure was excellent at 112/62.  Her last lipid profile was excellent.  She has no other cardiac symptoms. She denies chest discomfort or shortness of breath.  She denies symptoms of dizziness, lightheadedness or near-syncope/syncope.  She denies leg edema.  She denies symptoms of orthopnea or paroxysmal nocturnal dyspnea.      DISCUSSION/PLAN:          Begin metoprolol succinate 25 mg daily at bedtime  Return in 3 months  EKG on return    No results found for this visit on 10/30/24.      Lab Studies:    Lab Results   Component Value Date    CHOLESTEROL 150 09/23/2024    CHOLESTEROL 142 06/21/2024    CHOLESTEROL 131 02/19/2024     Lab Results   Component Value Date    TRIG 105 09/23/2024    TRIG 106 06/21/2024    TRIG 96 02/19/2024     Lab Results   Component Value Date    HDL 47 (L) 09/23/2024    HDL 48 (L) 06/21/2024    HDL 50 02/19/2024     Lab Results   Component Value Date    LDLCALC 82  09/23/2024    LDLCALC 73 06/21/2024    LDLCALC 62 02/19/2024         Lab Results   Component Value Date    HGBA1C 7.0 (H) 09/23/2024    HGBA1C 6.8 (H) 06/21/2024    HGBA1C 7.0 (H) 02/19/2024      Lab Results   Component Value Date    EGFR 91 09/23/2024    EGFR 80 06/21/2024    EGFR 88 02/19/2024    SODIUM 140 09/23/2024    SODIUM 141 06/21/2024    SODIUM 140 02/19/2024    K 3.9 09/23/2024    K 4.2 06/21/2024    K 3.9 02/19/2024     09/23/2024     06/21/2024     02/19/2024    CO2 29 09/23/2024    CO2 28 06/21/2024    CO2 27 02/19/2024    ANIONGAP 6 05/30/2015    ANIONGAP 8 01/17/2015    ANIONGAP 8 10/18/2014    BUN 13 09/23/2024    BUN 20 06/21/2024    BUN 12 02/19/2024    CREATININE 0.69 09/23/2024    CREATININE 0.78 06/21/2024    CREATININE 0.72 02/19/2024    MG 2.1 07/26/2019     Lab Results   Component Value Date    WBC 7.30 09/23/2024    WBC 7.02 06/21/2024    WBC 6.13 02/19/2024    HGB 14.1 09/23/2024    HGB 14.6 06/21/2024    HGB 14.4 02/19/2024    HCT 41.9 09/23/2024    HCT 42.5 06/21/2024    HCT 43.0 02/19/2024    MCV 90 09/23/2024    MCV 88 06/21/2024    MCV 87 02/19/2024    MCH 30.1 09/23/2024    MCH 30.2 06/21/2024    MCH 29.1 02/19/2024    MCHC 33.7 09/23/2024    MCHC 34.4 06/21/2024    MCHC 33.5 02/19/2024     09/23/2024     06/21/2024     02/19/2024      Lab Results   Component Value Date    GLUCOSE 107 05/30/2015    GLUCOSE 107 01/17/2015    GLUCOSE 111 10/18/2014    CALCIUM 9.5 09/23/2024    CALCIUM 9.7 06/21/2024    CALCIUM 9.6 02/19/2024    ALB 4.4 06/21/2024    ALB 4.1 02/19/2024    ALB 4.1 03/25/2023    TP 7.3 06/21/2024    TP 7.1 02/19/2024    TP 6.9 03/25/2023    AST 19 06/21/2024    AST 21 02/19/2024    AST 20 03/25/2023    ALT 17 06/21/2024    ALT 25 02/19/2024    ALT 18 03/25/2023    ALKPHOS 86 06/21/2024    ALKPHOS 87 02/19/2024    ALKPHOS 78 03/25/2023    BILITOT 0.4 05/30/2015    BILITOT 0.7 01/17/2015    BILITOT 0.4 10/18/2014       Lab Results    Component Value Date    FREET4 1.09 03/25/2023    FREET4 1.69 (H) 07/10/2020    FREET4 1.27 10/03/2015       Lab Results   Component Value Date    CRP 22.2 (H) 10/18/2014         Current Outpatient Medications:     albuterol (Ventolin HFA) 90 mcg/act inhaler, Inhale 2 puffs every 6 (six) hours as needed for wheezing, Disp: 18 g, Rfl: 1    atorvastatin (LIPITOR) 20 mg tablet, TAKE 1 TABLET BY MOUTH EVERY DAY, Disp: 90 tablet, Rfl: 1    glucose blood (OneTouch Verio) test strip, USE AS INSTRUCTED TO TEST BLOOD SUGAR ONCE A DAY, Disp: 100 strip, Rfl: 2    glucose blood test strip, Use as instructed, Disp: 100 strip, Rfl: 2    Lancets (OneTouch Delica Plus [id number]) MISC, TEST BLOOD SUGAR 2 TIMES DAILY, Disp: 100 each, Rfl: 3    levothyroxine 112 mcg tablet, Take 1 tablet (112 mcg total) by mouth daily in the early morning, Disp: 90 tablet, Rfl: 0    metFORMIN (GLUCOPHAGE-XR) 500 mg 24 hr tablet, TAKE 1 TABLET BY MOUTH EVERY DAY WITH DINNER, Disp: 90 tablet, Rfl: 1    metoprolol succinate (TOPROL-XL) 25 mg 24 hr tablet, Take 1 tablet (25 mg total) by mouth daily, Disp: 30 tablet, Rfl: 5    omeprazole (PriLOSEC) 40 MG capsule, TAKE 1 CAPSULE (40 MG TOTAL) BY MOUTH DAILY., Disp: 90 capsule, Rfl: 1    traZODone (DESYREL) 50 mg tablet, TAKE 1 TABLET BY MOUTH EVERYDAY AT BEDTIME, Disp: 90 tablet, Rfl: 1  No Known Allergies    Past Medical History:   Diagnosis Date    Anxiety 03/29/2019    Asthma     Cataract     viral    Chronic kidney disease     Class 1 obesity due to excess calories with serious comorbidity and body mass index (BMI) of 30.0 to 30.9 in adult 12/10/2018    Encounter for well adult exam with abnormal findings 12/10/2018    Herpes zoster without complication 03/06/2020    Hyperlipidemia     Hypertension     Hypothyroid     IFG (impaired fasting glucose) 01/14/2019    Incisional hernia     Insomnia     Kidney stone     in past    Left upper quadrant pain 07/22/2019    Lower abdominal pain 08/24/2020    LUQ  pain 07/18/2022    Osteoarthritis     RA (rheumatoid arthritis) ([facility]) 02/24/2015    pt unsure     Type 2 diabetes mellitus without complication, without long-term current use of insulin ([facility]) 02/04/2019    NIDDM    Uses [place] as primary spoken language     Varicose veins of both lower extremities     Wears dentures      upper partials    Wears glasses      Social History     Socioeconomic History    Marital status: /Civil Union     Spouse name: Not on file    Number of children: Not on file    Years of education: Not on file    Highest education level: Not on file   Occupational History    Not on file   Tobacco Use    Smoking status: Never     Passive exposure: Never    Smokeless tobacco: Never    Tobacco comments:     no passive smoke exposure   Vaping Use    Vaping status: Never Used   Substance and Sexual Activity    Alcohol use: Not Currently     Comment: liquor    Drug use: No    Sexual activity: Not Currently     Partners: Male   Other Topics Concern    Not on file   Social History Narrative    Not on file     Social Determinants of Health     Financial Resource Strain: Low Risk  (12/15/2021)    Overall Financial Resource Strain (CARDIA)     Difficulty of Paying Living Expenses: Not hard at all   Food Insecurity: No Food Insecurity (8/2/2024)    Nursing - Inadequate Food Risk Classification     Worried About Running Out of Food in the Last Year: Never true     Ran Out of Food in the Last Year: Never true     Ran Out of Food in the Last Year: Not on file   Transportation Needs: No Transportation Needs (8/2/2024)    PRAPARE - Transportation     Lack of Transportation (Medical): No     Lack of Transportation (Non-Medical): No   Physical Activity: Sufficiently Active (12/15/2021)    Exercise Vital Sign     Days of Exercise per Week: 3 days     Minutes of Exercise per Session: 60 min   Stress: No Stress Concern Present (12/15/2021)    [place] [employer] [place] [employer] - Occupational Stress  Questionnaire     Feeling of Stress : Not at all   Social Connections: Moderately Integrated (12/15/2021)    Social Connection and Isolation Panel [NHANES]     Frequency of Communication with Friends and Family: More than three times a week     Frequency of Social Gatherings with Friends and Family: More than three times a week     Attends [employer] Services: More than 4 times per year     Active Member of Clubs or Organizations: No     Attends Club or Organization Meetings: Never     Marital Status:    Intimate Partner Violence: Not At Risk (12/15/2021)    Humiliation, Afraid, Rape, and Kick questionnaire     Fear of Current or Ex-Partner: No     Emotionally Abused: No     Physically Abused: No     Sexually Abused: No   Housing Stability: Low Risk  (2024)    Housing Stability Vital Sign     Unable to Pay for Housing in the Last Year: No     Number of Times Moved in the Last Year: 1     Homeless in the Last Year: No      Family History   Problem Relation Age of Onset    Heart disease Mother     Emphysema Mother     COPD Father     Heart disease Father     No Known Problems Sister     No Known Problems Sister     No Known Problems Sister     No Known Problems Sister     No Known Problems Daughter     No Known Problems Maternal Grandmother     No Known Problems Maternal Grandfather     No Known Problems Paternal Grandmother     No Known Problems Paternal Grandfather     No Known Problems Maternal Aunt     No Known Problems Maternal Aunt     No Known Problems Paternal Aunt     Breast cancer Neg Hx      Past Surgical History:   Procedure Laterality Date    APPENDECTOMY      BREAST BIOPSY Right 1992    negative     SECTION      x 3    CHOLECYSTECTOMY      COLONOSCOPY      EYE SURGERY      HERNIA REPAIR      HYSTERECTOMY      MI LAP RPR HRNA XCPT INCAL/INGUN NCRC8/STRANGULATED N/A 2019    Procedure: REPAIR HERNIA INCISIONAL LAPAROSCOPIC W/ ROBOTICS;  Surgeon: Sadia Sanchez MD;  Location: [facility]  "Main OR;  Service: General    UPPER GASTROINTESTINAL ENDOSCOPY         PREVIOUS WEIGHTS:   Wt Readings from Last 10 Encounters:   10/30/24 75.1 kg (165 lb 9.6 oz)   09/25/24 76.1 kg (167 lb 12.8 oz)   08/26/24 77.1 kg (170 lb)   08/02/24 76.4 kg (168 lb 6.4 oz)   06/25/24 76.2 kg (168 lb)   03/22/24 76.2 kg (168 lb)   02/23/24 76.8 kg (169 lb 6.4 oz)   02/22/24 76.7 kg (169 lb)   02/02/24 76.7 kg (169 lb 3.2 oz)   01/23/24 77.2 kg (170 lb 3.2 oz)        Review of Systems:  Review of Systems   Respiratory:  Negative for cough, choking, chest tightness, shortness of breath and wheezing.    Cardiovascular:  Negative for chest pain, palpitations and leg swelling.   Musculoskeletal:  Negative for gait problem.   Skin:  Negative for rash.   Neurological:  Negative for dizziness, tremors, syncope, weakness, light-headedness, numbness and headaches.   Psychiatric/Behavioral:  Negative for agitation and behavioral problems. The patient is not hyperactive.        Physical Exam:  /62   Pulse 72   Ht 5' 3.5\" (1.613 m)   Wt 75.1 kg (165 lb 9.6 oz)   SpO2 100%   BMI 28.87 kg/m²     Physical Exam  Constitutional:       General: She is not in acute distress.     Appearance: She is well-developed.   HENT:      Head: Normocephalic and atraumatic.   Neck:      Thyroid: No thyromegaly.      Vascular: No carotid bruit or JVD.      Trachea: No tracheal deviation.   Cardiovascular:      Rate and Rhythm: Normal rate and regular rhythm.      Pulses: Normal pulses.      Heart sounds: Normal heart sounds. No murmur heard.     No friction rub. No gallop.   Pulmonary:      Effort: Pulmonary effort is normal. No respiratory distress.      Breath sounds: Normal breath sounds. No wheezing, rhonchi or rales.   Chest:      Chest wall: No tenderness.   Musculoskeletal:         General: Normal range of motion.      Cervical back: Normal range of motion and neck supple.      Right lower leg: No edema.      Left lower leg: No edema.   Skin:   " "  General: Skin is warm and dry.   Neurological:      General: No focal deficit present.      Mental Status: She is alert and oriented to person, place, and time.   Psychiatric:         Mood and Affect: Mood normal.         Behavior: Behavior normal.         Thought Content: Thought content normal.         Judgment: Judgment normal.     ======================================================  Imaging:   Results Review Statement: No pertinent imaging studies reviewed.    Portions of the record may have been created with voice recognition software. Occasional wrong word or \"sound a like\" substitutions may have occurred due to the inherent limitations of voice recognition software. Read the chart carefully and recognize, using context, where substitutions have occurred.    SIGNATURES:   Harry Quach MD   "

## 2024-10-31 ENCOUNTER — OFFICE VISIT (OUTPATIENT)
Dept: PHYSICAL THERAPY | Facility: CLINIC | Age: 64
End: 2024-10-31
Payer: MEDICARE

## 2024-10-31 DIAGNOSIS — M54.2 ACUTE NECK PAIN: Primary | ICD-10-CM

## 2024-10-31 PROCEDURE — 97112 NEUROMUSCULAR REEDUCATION: CPT | Performed by: PHYSICAL THERAPIST

## 2024-10-31 PROCEDURE — 97140 MANUAL THERAPY 1/> REGIONS: CPT | Performed by: PHYSICAL THERAPIST

## 2024-10-31 PROCEDURE — 97110 THERAPEUTIC EXERCISES: CPT | Performed by: PHYSICAL THERAPIST

## 2024-10-31 NOTE — PROGRESS NOTES
"Daily Note     Today's date: 10/31/2024  Patient name: Esha Oh  : 1960  MRN: [id number]  Referring provider: Shirley Almonte MD  Dx:   Encounter Diagnosis     ICD-10-CM    1. Acute neck pain  M54.2           Start Time: 932  Stop Time: 1010  Total time in clinic (min): 38 minutes    Subjective: Patient reports some improvement in neck pain today at 6/10. She tried completing the exercises at home and continues to have the most difficulty with rotation motions.      Objective: See treatment diary below      Assessment: Patient started no moneys and exhibits good scap retraction post TC. Patient exhibited good form for the first few chin tucks with increased cervical flexion with repetitions, improved form post VC. Patient started UT and LS str with improved neck ROM post. Patient tolerated UT str and STM well with b/l UT tenderness and hypertonicity, UT/LS str and no moneys added to HEP. Patient demonstrated fatigue post treatment, exhibited good technique with therapeutic exercises, and would benefit from continued PT.      Plan: Continue per plan of care.  Progress treatment as tolerated.  Patient is leaving for [place] until , plan for follow up after returning.     Session completed by SUJATHA Sanchez under supervision from Fito Rodriguez DPT.      Precautions: N/A      Date 10/29 10/31           Visit # IE 2           FOTO IE             Re-eval IE                 Manuals 10/29 10/31           Cervical PROM ND pn! Into L rot ND           Cervical upglides             UT/LS str and STM ND UT ND UT                        Neuro Re-Ed 10/29 10/31           Chin tuck 5x5\" 15x5\"           Scap retract 10x5\" 15x5\"           DNF endurance 5x15\" 5x10\"           No moneys  2x10 PTB           Cervical Isometrics             Snags                          Ther Ex 10/29 10/31           UT/LS str  5x30\" ea           Pec str                                                                     "                       Ther Activity 10/29 10/31                                     Gait Training 10/29 10/31                                     Modalities 10/29 10/31           [facility] 10' 10' pre

## 2024-11-09 DIAGNOSIS — R10.13 DYSPEPSIA: ICD-10-CM

## 2024-11-09 DIAGNOSIS — K29.50 CHRONIC GASTRITIS WITHOUT BLEEDING, UNSPECIFIED GASTRITIS TYPE: ICD-10-CM

## 2024-11-09 DIAGNOSIS — K20.90 ESOPHAGITIS: ICD-10-CM

## 2024-11-11 RX ORDER — OMEPRAZOLE 40 MG/1
40 CAPSULE, DELAYED RELEASE ORAL DAILY
Qty: 90 CAPSULE | Refills: 1 | Status: SHIPPED | OUTPATIENT
Start: 2024-11-11 | End: 2025-05-10

## 2024-11-20 ENCOUNTER — OFFICE VISIT (OUTPATIENT)
Dept: FAMILY MEDICINE CLINIC | Facility: CLINIC | Age: 64
End: 2024-11-20
Payer: MEDICARE

## 2024-11-20 VITALS
DIASTOLIC BLOOD PRESSURE: 80 MMHG | WEIGHT: 169.4 LBS | BODY MASS INDEX: 28.92 KG/M2 | SYSTOLIC BLOOD PRESSURE: 118 MMHG | TEMPERATURE: 97.6 F | OXYGEN SATURATION: 96 % | HEIGHT: 64 IN | HEART RATE: 79 BPM

## 2024-11-20 DIAGNOSIS — R10.31 RLQ ABDOMINAL PAIN: Primary | ICD-10-CM

## 2024-11-20 LAB
BACTERIA UR QL AUTO: ABNORMAL /HPF
BILIRUB UR QL STRIP: NEGATIVE
CLARITY UR: CLEAR
COLOR UR: YELLOW
GLUCOSE UR STRIP-MCNC: NEGATIVE MG/DL
HGB UR QL STRIP.AUTO: NEGATIVE
KETONES UR STRIP-MCNC: NEGATIVE MG/DL
LEUKOCYTE ESTERASE UR QL STRIP: NEGATIVE
MUCOUS THREADS UR QL AUTO: ABNORMAL
NITRITE UR QL STRIP: NEGATIVE
NON-SQ EPI CELLS URNS QL MICRO: ABNORMAL /HPF
PH UR STRIP.AUTO: 5.5 [PH]
PROT UR STRIP-MCNC: ABNORMAL MG/DL
RBC #/AREA URNS AUTO: ABNORMAL /HPF
SP GR UR STRIP.AUTO: 1.02 (ref 1–1.03)
UROBILINOGEN UR STRIP-ACNC: <2 MG/DL
WBC #/AREA URNS AUTO: ABNORMAL /HPF

## 2024-11-20 PROCEDURE — 99214 OFFICE O/P EST MOD 30 MIN: CPT | Performed by: FAMILY MEDICINE

## 2024-11-20 PROCEDURE — 81001 URINALYSIS AUTO W/SCOPE: CPT | Performed by: FAMILY MEDICINE

## 2024-11-20 PROCEDURE — G2211 COMPLEX E/M VISIT ADD ON: HCPCS | Performed by: FAMILY MEDICINE

## 2024-11-20 PROCEDURE — 87086 URINE CULTURE/COLONY COUNT: CPT | Performed by: FAMILY MEDICINE

## 2024-11-20 NOTE — PROGRESS NOTES
Name: Esha Oh      : 1960      MRN: [id number]  Encounter Provider: Shirley Almonte MD  Encounter Date: 2024   Encounter department: [facility]      Assessment & Plan  RLQ abdominal pain  Symptomatic patient already evaluated by her well OB specialist and that she had ultrasound and pelvic exam no abnormal finding patient had history of surgical abdomen multiple surgery possible adhesion recommended to do CAT scan of the abdomen and pelvic without contrast will follow-up with the result accordingly we send the urine for UA and CS discussed workup with the patient and she agree in case symptoms get worse to go to the emergency room    Orders:    CT abdomen pelvis wo contrast; Future    Urinalysis with microscopic; Future    Urine culture; Future         History of Present Illness     Patient today concerned about pain in her right lower quadrant her and this has been going on for almost 1 months and that sometimes worse than the other no vaginal bleeding or discharge no constipation or diarrhea patient was seen recently by her OB/GYN specialist pelvic exam was done and transvaginal ultrasound was done no abnormal finding patient persistent to have the pain no fever no chills no change in the diet and no recent traveling patient had multiple surgeries in the abdomen cholecystectomy appendectomy and she had multiple       Review of Systems   Constitutional:  Negative for chills and fever.   HENT:  Negative for ear pain and sore throat.    Eyes:  Negative for pain and visual disturbance.   Respiratory:  Negative for cough and shortness of breath.    Cardiovascular:  Negative for chest pain and palpitations.   Gastrointestinal:  Positive for abdominal pain. Negative for constipation, diarrhea and vomiting.   Genitourinary:  Negative for dysuria and hematuria.   Musculoskeletal:  Negative for gait problem.   Skin:  Negative for color change and rash.  "  Neurological:  Negative for seizures and syncope.   All other systems reviewed and are negative.    Objective     /80 (BP Location: Left arm, Patient Position: Sitting)   Pulse 79   Temp 97.6 °F (36.4 °C) (Tympanic)   Ht 5' 3.5\" (1.613 m)   Wt 76.8 kg (169 lb 6.4 oz)   SpO2 96%   BMI 29.54 kg/m²     Physical Exam  Vitals and nursing note reviewed.   Constitutional:       General: She is not in acute distress.     Appearance: She is well-developed. She is not diaphoretic.   HENT:      Head: Normocephalic.      Right Ear: Tympanic membrane and external ear normal.      Left Ear: Tympanic membrane and external ear normal.      Nose: No rhinorrhea.      Mouth/Throat:      Pharynx: No posterior oropharyngeal erythema.   Eyes:      General:         Right eye: No discharge.         Left eye: No discharge.      Conjunctiva/sclera: Conjunctivae normal.   Neck:      Vascular: No JVD.   Cardiovascular:      Rate and Rhythm: Normal rate and regular rhythm.      Heart sounds: Normal heart sounds. No murmur heard.     No gallop.   Pulmonary:      Effort: Pulmonary effort is normal. No respiratory distress.      Breath sounds: Normal breath sounds. No wheezing.   Abdominal:      General: There is no distension.      Palpations: Abdomen is soft.      Tenderness: There is abdominal tenderness in the right lower quadrant. There is no rebound.   Musculoskeletal:         General: No tenderness.      Cervical back: Normal range of motion and neck supple.   Lymphadenopathy:      Cervical: No cervical adenopathy.   Skin:     General: Skin is warm.      Findings: No rash.   Neurological:      Mental Status: She is alert and oriented to person, place, and time.         Shirley Almonte MD     "

## 2024-11-20 NOTE — ASSESSMENT & PLAN NOTE
Symptomatic patient already evaluated by her well OB specialist and that she had ultrasound and pelvic exam no abnormal finding patient had history of surgical abdomen multiple surgery possible adhesion recommended to do CAT scan of the abdomen and pelvic without contrast will follow-up with the result accordingly we send the urine for UA and CS discussed workup with the patient and she agree in case symptoms get worse to go to the emergency room    Orders:    CT abdomen pelvis wo contrast; Future    Urinalysis with microscopic; Future    Urine culture; Future

## 2024-11-21 LAB — BACTERIA UR CULT: NORMAL

## 2024-11-22 DIAGNOSIS — I47.10 SVT (SUPRAVENTRICULAR TACHYCARDIA) (HCC): ICD-10-CM

## 2024-11-22 RX ORDER — METOPROLOL SUCCINATE 25 MG/1
25 TABLET, EXTENDED RELEASE ORAL DAILY
Qty: 90 TABLET | Refills: 1 | Status: SHIPPED | OUTPATIENT
Start: 2024-11-22

## 2024-11-27 ENCOUNTER — HOSPITAL ENCOUNTER (OUTPATIENT)
Dept: CT IMAGING | Facility: HOSPITAL | Age: 64
Discharge: HOME | End: 2024-11-27
Payer: MEDICARE

## 2024-11-27 DIAGNOSIS — R10.31 RLQ ABDOMINAL PAIN: ICD-10-CM

## 2024-11-27 PROCEDURE — 74176 CT ABD & PELVIS W/O CONTRAST: CPT

## 2024-11-29 ENCOUNTER — RESULTS FOLLOW-UP (OUTPATIENT)
Dept: FAMILY MEDICINE CLINIC | Facility: CLINIC | Age: 64
End: 2024-11-29

## 2024-11-29 NOTE — RESULT ENCOUNTER NOTE
Result of the CAT scan abdomen and pelvic and no acute finding patient had fatty liver coming in diabetic patient with history of overweight will discuss management next appointment

## 2024-12-05 ENCOUNTER — RESULTS FOLLOW-UP (OUTPATIENT)
Dept: FAMILY MEDICINE CLINIC | Facility: CLINIC | Age: 64
End: 2024-12-05

## 2025-01-20 NOTE — ASSESSMENT & PLAN NOTE
3/22/2024  120/74  6/25/2024 110/70  8/2/2024 130/68  8/26/24  120/70  9/25/2024 110/78  10/30/2024 112/62  11/20/2024 118/80    Controlled on no medications

## 2025-01-20 NOTE — ASSESSMENT & PLAN NOTE
Patient has symptoms of palpitations mainly when she lays down at night although sometimes it also occurs during the day.  Her heart feels like it is pounding.  She does have some shortness of breath with these palpitations but it may be secondary to anxiety.  She is concerned that she might have a heart attack or even die.  These episodes last for 5 or 10 minutes and then giuliano    She had a 48 hr holter monitor performed which demonstrates a HR ranged from 43 to 136 bpm with 12 episodes of nonsustained supraventricular tachycardia with the longest run lasting for 19 beats at a heart rate of 150 bpm.  Treating this SVT with a heart rate at times as low as 43 bpm represents a problem.  I do do not feel that her rhythm as it presently is warrant a pacemaker to treat the supraventricular tachycardia.    9/25/2024 [employer] monitor:  --Predominant underlying rhythm was Sinus Rhythm at a min HR of 46 bpm, max HR of 119 bpm, and avg HR of 74 bpm..   --1 run of Ventricular Tachycardia occurred lasting 5 beats with a max rate of 197 bpm (avg 184 bpm).   -- 60 Supraventricular Tachycardia runs occurred, the run with the fastest interval lasting 11.0 secs with a max rate of 203 bpm (avg 165 bpm); the run with the fastest interval was also the longest.     Metoprolol succinate 25 mg daily

## 2025-01-24 ENCOUNTER — OFFICE VISIT (OUTPATIENT)
Dept: CARDIOLOGY CLINIC | Facility: CLINIC | Age: 65
End: 2025-01-24
Payer: MEDICARE

## 2025-01-24 VITALS
DIASTOLIC BLOOD PRESSURE: 70 MMHG | WEIGHT: 169.8 LBS | SYSTOLIC BLOOD PRESSURE: 128 MMHG | BODY MASS INDEX: 29.61 KG/M2 | HEART RATE: 74 BPM

## 2025-01-24 DIAGNOSIS — E78.00 PURE HYPERCHOLESTEROLEMIA: ICD-10-CM

## 2025-01-24 DIAGNOSIS — I47.10 SVT (SUPRAVENTRICULAR TACHYCARDIA) (HCC): Primary | ICD-10-CM

## 2025-01-24 DIAGNOSIS — I10 ESSENTIAL HYPERTENSION: ICD-10-CM

## 2025-01-24 DIAGNOSIS — E11.9 TYPE 2 DIABETES MELLITUS WITHOUT COMPLICATION, WITHOUT LONG-TERM CURRENT USE OF INSULIN (HCC): ICD-10-CM

## 2025-01-24 PROCEDURE — 99214 OFFICE O/P EST MOD 30 MIN: CPT | Performed by: INTERNAL MEDICINE

## 2025-01-24 PROCEDURE — 93000 ELECTROCARDIOGRAM COMPLETE: CPT | Performed by: INTERNAL MEDICINE

## 2025-01-24 NOTE — PROGRESS NOTES
[facility]  [address] [place] [address]  Phone#  [phone]   Fax#  [phone]  *-*-*-*-*-*-*-*-*-*-*-*-*-*-*-*-*-*-*-*-*-*-*-*-*-*-*-*-*-*-*-*-*-*-*-*-*-*-*-*-*-*-*-*-*-*-*-*-*-*-*-*-*-*                                   Cardiology Follow Up      ENCOUNTER DATE: 25 4:00 PM  PATIENT NAME: Esha Oh   : 1960    MRN: [id number]  AGE:64 y.o.      SEX: female  ENCOUNTER PROVIDER:Harry Quach MD     PRIMARY CARE PHYSICIAN: Shirley Almonte MD    ACTIVE DIAGNOSIS AND PLAN    1. SVT (supraventricular tachycardia) (HCC)  Assessment & Plan:  Patient has symptoms of palpitations mainly when she lays down at night although sometimes it also occurs during the day.  Her heart feels like it is pounding.  She does have some shortness of breath with these palpitations but it may be secondary to anxiety.  She is concerned that she might have a heart attack or even die.  These episodes last for 5 or 10 minutes and then giuliano    She had a 48 hr holter monitor performed which demonstrates a HR ranged from 43 to 136 bpm with 12 episodes of nonsustained supraventricular tachycardia with the longest run lasting for 19 beats at a heart rate of 150 bpm.  Treating this SVT with a heart rate at times as low as 43 bpm represents a problem.  I do do not feel that her rhythm as it presently is warrant a pacemaker to treat the supraventricular tachycardia.    2024 Ziop[employer] monitor:  --Predominant underlying rhythm was Sinus Rhythm at a min HR of 46 bpm, max HR of 119 bpm, and avg HR of 74 bpm..   --1 run of Ventricular Tachycardia occurred lasting 5 beats with a max rate of 197 bpm (avg 184 bpm).   -- 60 Supraventricular Tachycardia runs occurred, the run with the fastest interval lasting 11.0 secs with a max rate of 203 bpm (avg 165 bpm); the run with the fastest interval was also the longest.     Metoprolol succinate 25 mg daily  Orders:  -     POCT ECG  2. Pure  hypercholesterolemia  Assessment & Plan:  Good lipid panel  Continue atorvastatin 20 mg daily  3. Essential hypertension  Assessment & Plan:  3/22/2024  120/74  6/25/2024 110/70  8/2/2024 130/68  8/26/24  120/70  9/25/2024 110/78  10/30/2024 112/62  11/20/2024 118/80    Controlled on no medications    Orders:  -     POCT ECG  4. Type 2 diabetes mellitus without complication, without long-term current use of insulin ([facility])  Assessment & Plan:    Lab Results   Component Value Date    HGBA1C 7.0 (H) 09/23/2024     Defer management to PCP     INTERVAL HISTORY:        Patient with palpitations and documented supraventricular tachycardia with the longest episode being 11 seconds but a total of 60 episodes of SVT in a 2-week.  At time.  Unfortunately patient's slowest heartbeat on the 2-week monitor was 46 and on a 48-hour Holter monitor was 43.  I am concerned that if we begin metoprolol to control the SVT, her minimum heart rate will be lower.  Since the SVT is not associated with other symptoms other than the palpitations, I do not feel that the SVT warrants placement of a pacemaker so it can be treated.  If she began having associated symptoms such as dizziness and lightheadedness, near-syncope/syncope, or shortness of breath, I feel she will qualify for a pacemaker to treat the SVT.  In essence she has tachycardia-bradycardia syndrome.    Discussed with patient and she feels since the palpitations are not every day, she can tolerate the symptoms.    DISCUSSION/PLAN:          Will see patient in 6 months but patient to call if the SVT becomes worse or associated with other symptoms  EKG on return.    CARDIOLOGY HISTORY AND TESTING  Patient referred to cardiology by  Shirley Almonte MD for palpitations and shortness of breath.     Patient has symptoms of palpitations mainly when she lays down at night although sometimes it also occurs during the day.  Her heart feels like it is pounding.  She does have some shortness of  breath with these palpitations but it may be secondary to anxiety.  She is concerned that she might have a heart attack or even die.  These episodes last for 5 or 10 minutes and then giuliano.     She had a 48 hr holter monitor performed which demonstrates a HR ranged from 43 to 136 bpm with 12 episodes of nonsustained supraventricular tachycardia with the longest run lasting for 19 beats at a heart rate of 150 bpm.  Treating this SVT with a heart rate at times as low as 43 bpm represents a problem.  I do do not feel that her rhythm as it presently is warrant a pacemaker to treat the supraventricular tachycardia.    9/6/2023 stress echo: Nondiagnostic for ischemia due to failure of patient to reach target heart rate.  Maximum heart rate was 78% of MPHR.  Resting EF 60% with grade 1 diastolic dysfunction.  Severely impaired exercise capacity due to fatigue and shortness of breath.  Stage 2 hypertension at rest.  Normal ECG.    9/6/2023 48-hour Holter monitor: Heart rate ranged from 43 to 136 bpm and average 71 bpm.  48 ventricular ectopic beats.  177 supraventricular ectopic beats.  12 nonsustained atrial runs longest lasting 19 beats at a heart rate of 150 bpm.  Longest R to R interval 1.5 seconds.  No significant bradycardia or heart block.    9/25/2024 Ziopatch monitor:  --Predominant underlying rhythm was Sinus Rhythm at a min HR of 46 bpm, max HR of 119 bpm, and avg HR of 74 bpm..   --1 run of Ventricular Tachycardia occurred lasting 5 beats with a max rate of 197 bpm (avg 184 bpm). -- 60 Supraventricular Tachycardia runs occurred, the run with the fastest interval lasting 11.0 secs with a max rate of 203 bpm (avg 165 bpm); the run with the fastest interval was also the longest.     ACTIVE PROBLEM LIST  Patient Active Problem List   Diagnosis    Mild intermittent asthma without complication    Cervical myofascial pain syndrome    Cervical stenosis of spinal canal    Degeneration of lumbar intervertebral disc     Encounter for monitoring chronic NSAID therapy    Essential hypertension    Hypothyroidism    Gastritis due to nonsteroidal anti-inflammatory drug    Kidney stone    Pure hypercholesterolemia    Neuralgia    Osteoarthritis    Primary osteoarthritis of left knee    Vitamin D deficiency    Immunization refused    Overweight (BMI 25.0-29.9)    Weakness of right arm    LUIS positive    Type 2 diabetes mellitus without complication, without long-term current use of insulin (HCC)    Snoring    Other insomnia    Anxiety    Herpes zoster without complication    Vertigo    Other microscopic hematuria    Varicose veins of left lower extremity with pain    Chronic pain of right knee    Breast pain, left    Pain of left lower extremity    First degree burn    Generalized abdominal pain    Pre-op examination    Hepatic steatosis    Cataracta    Situational anxiety    Left hip pain    Leiomyoma of uterus    Chronic pain of left knee    Palpitation    SOB (shortness of breath)    Blood in stool    Discogenic cervical pain    Acute diverticulitis    Post-menopause    Abnormal mammogram    Chronic left-sided low back pain without sciatica    SVT (supraventricular tachycardia) (HCC)    RLQ abdominal pain       TODAY'S EKG  Results for orders placed or performed in visit on 01/24/25   POCT ECG    Narrative    Normal sinus rhythm at a rate of 74 bpm.  Left axis deviation.  Abnormal EKG.         Lab Studies:    Lab Results   Component Value Date    CHOLESTEROL 150 09/23/2024    CHOLESTEROL 142 06/21/2024    CHOLESTEROL 131 02/19/2024     Lab Results   Component Value Date    TRIG 105 09/23/2024    TRIG 106 06/21/2024    TRIG 96 02/19/2024     Lab Results   Component Value Date    HDL 47 (L) 09/23/2024    HDL 48 (L) 06/21/2024    HDL 50 02/19/2024     Lab Results   Component Value Date    LDLCALC 82 09/23/2024    LDLCALC 73 06/21/2024    LDLCALC 62 02/19/2024       Lab Results   Component Value Date    HGBA1C 7.0 (H) 09/23/2024    HGBA1C  6.8 (H) 06/21/2024    HGBA1C 7.0 (H) 02/19/2024      Lab Results   Component Value Date    EGFR 91 09/23/2024    EGFR 80 06/21/2024    EGFR 88 02/19/2024    SODIUM 140 09/23/2024    SODIUM 141 06/21/2024    SODIUM 140 02/19/2024    K 3.9 09/23/2024    K 4.2 06/21/2024    K 3.9 02/19/2024     09/23/2024     06/21/2024     02/19/2024    CO2 29 09/23/2024    CO2 28 06/21/2024    CO2 27 02/19/2024    ANIONGAP 6 05/30/2015    ANIONGAP 8 01/17/2015    ANIONGAP 8 10/18/2014    BUN 13 09/23/2024    BUN 20 06/21/2024    BUN 12 02/19/2024    CREATININE 0.69 09/23/2024    CREATININE 0.78 06/21/2024    CREATININE 0.72 02/19/2024    MG 2.1 07/26/2019     Lab Results   Component Value Date    WBC 7.30 09/23/2024    WBC 7.02 06/21/2024    WBC 6.13 02/19/2024    HGB 14.1 09/23/2024    HGB 14.6 06/21/2024    HGB 14.4 02/19/2024    HCT 41.9 09/23/2024    HCT 42.5 06/21/2024    HCT 43.0 02/19/2024    MCV 90 09/23/2024    MCV 88 06/21/2024    MCV 87 02/19/2024    MCH 30.1 09/23/2024    MCH 30.2 06/21/2024    MCH 29.1 02/19/2024    MCHC 33.7 09/23/2024    MCHC 34.4 06/21/2024    MCHC 33.5 02/19/2024     09/23/2024     06/21/2024     02/19/2024      Lab Results   Component Value Date    GLUCOSE 107 05/30/2015    GLUCOSE 107 01/17/2015    GLUCOSE 111 10/18/2014    CALCIUM 9.5 09/23/2024    CALCIUM 9.7 06/21/2024    CALCIUM 9.6 02/19/2024    ALB 4.4 06/21/2024    ALB 4.1 02/19/2024    ALB 4.1 03/25/2023    TP 7.3 06/21/2024    TP 7.1 02/19/2024    TP 6.9 03/25/2023    AST 19 06/21/2024    AST 21 02/19/2024    AST 20 03/25/2023    ALT 17 06/21/2024    ALT 25 02/19/2024    ALT 18 03/25/2023    ALKPHOS 86 06/21/2024    ALKPHOS 87 02/19/2024    ALKPHOS 78 03/25/2023    BILITOT 0.4 05/30/2015    BILITOT 0.7 01/17/2015    BILITOT 0.4 10/18/2014       Lab Results   Component Value Date    FREET4 1.09 03/25/2023    FREET4 1.69 (H) 07/10/2020    FREET4 1.27 10/03/2015       Lab Results   Component Value Date     CRP 22.2 (H) 10/18/2014         Current Outpatient Medications:     albuterol (Ventolin HFA) 90 mcg/act inhaler, Inhale 2 puffs every 6 (six) hours as needed for wheezing, Disp: 18 g, Rfl: 1    atorvastatin (LIPITOR) 20 mg tablet, TAKE 1 TABLET BY MOUTH EVERY DAY, Disp: 90 tablet, Rfl: 1    glucose blood (OneTouch Verio) test strip, USE AS INSTRUCTED TO TEST BLOOD SUGAR ONCE A DAY, Disp: 100 strip, Rfl: 2    glucose blood test strip, Use as instructed, Disp: 100 strip, Rfl: 2    Lancets (OneTouch Delica Plus [id number]) MISC, TEST BLOOD SUGAR 2 TIMES DAILY, Disp: 100 each, Rfl: 3    levothyroxine 112 mcg tablet, Take 1 tablet (112 mcg total) by mouth daily in the early morning, Disp: 90 tablet, Rfl: 0    metFORMIN (GLUCOPHAGE-XR) 500 mg 24 hr tablet, TAKE 1 TABLET BY MOUTH EVERY DAY WITH DINNER, Disp: 90 tablet, Rfl: 1    metoprolol succinate (TOPROL-XL) 25 mg 24 hr tablet, TAKE 1 TABLET (25 MG TOTAL) BY MOUTH DAILY., Disp: 90 tablet, Rfl: 1    omeprazole (PriLOSEC) 40 MG capsule, TAKE 1 CAPSULE (40 MG TOTAL) BY MOUTH DAILY., Disp: 90 capsule, Rfl: 1  No Known Allergies    Past Medical History:   Diagnosis Date    Anxiety 03/29/2019    Asthma     Cataract     viral    Chronic kidney disease     Class 1 obesity due to excess calories with serious comorbidity and body mass index (BMI) of 30.0 to 30.9 in adult 12/10/2018    Encounter for well adult exam with abnormal findings 12/10/2018    Herpes zoster without complication 03/06/2020    Hyperlipidemia     Hypertension     Hypothyroid     IFG (impaired fasting glucose) 01/14/2019    Incisional hernia     Insomnia     Kidney stone     in past    Left upper quadrant pain 07/22/2019    Lower abdominal pain 08/24/2020    LUQ pain 07/18/2022    Osteoarthritis     RA (rheumatoid arthritis) ([facility]) 02/24/2015    pt unsure     Type 2 diabetes mellitus without complication, without long-term current use of insulin ([facility]) 02/04/2019    NIDDM    Uses Belgian as primary spoken  language     Varicose veins of both lower extremities     Wears dentures      upper partials    Wears glasses      Social History     Socioeconomic History    Marital status: /Civil Union     Spouse name: Not on file    Number of children: Not on file    Years of education: Not on file    Highest education level: Not on file   Occupational History    Not on file   Tobacco Use    Smoking status: Never     Passive exposure: Never    Smokeless tobacco: Never    Tobacco comments:     no passive smoke exposure   Vaping Use    Vaping status: Never Used   Substance and Sexual Activity    Alcohol use: Not Currently     Comment: liquor    Drug use: No    Sexual activity: Not Currently     Partners: Male   Other Topics Concern    Not on file   Social History Narrative    Not on file     Social Drivers of Health     Financial Resource Strain: Low Risk  (12/15/2021)    Overall Financial Resource Strain (CARDIA)     Difficulty of Paying Living Expenses: Not hard at all   Food Insecurity: No Food Insecurity (8/2/2024)    Nursing - Inadequate Food Risk Classification     Worried About Running Out of Food in the Last Year: Never true     Ran Out of Food in the Last Year: Never true     Ran Out of Food in the Last Year: Not on file   Transportation Needs: No Transportation Needs (8/2/2024)    PRAPARE - Transportation     Lack of Transportation (Medical): No     Lack of Transportation (Non-Medical): No   Physical Activity: Sufficiently Active (12/15/2021)    Exercise Vital Sign     Days of Exercise per Week: 3 days     Minutes of Exercise per Session: 60 min   Stress: No Stress Concern Present (12/15/2021)    [employer] [place] [employer] - Occupational Stress Questionnaire     Feeling of Stress : Not at all   Social Connections: Moderately Integrated (12/15/2021)    Social Connection and Isolation Panel [NHANES]     Frequency of Communication with Friends and Family: More than three times a week     Frequency of  Social Gatherings with Friends and Family: More than three times a week     Attends Congregation Services: More than 4 times per year     Active Member of Clubs or Organizations: No     Attends Club or Organization Meetings: Never     Marital Status:    Intimate Partner Violence: Not At Risk (12/15/2021)    Humiliation, Afraid, Rape, and Kick questionnaire     Fear of Current or Ex-Partner: No     Emotionally Abused: No     Physically Abused: No     Sexually Abused: No   Housing Stability: Low Risk  (2024)    Housing Stability Vital Sign     Unable to Pay for Housing in the Last Year: No     Number of Times Moved in the Last Year: 1     Homeless in the Last Year: No      Family History   Problem Relation Age of Onset    Heart disease Mother     Emphysema Mother     COPD Father     Heart disease Father     No Known Problems Sister     No Known Problems Sister     No Known Problems Sister     No Known Problems Sister     No Known Problems Daughter     No Known Problems Maternal Grandmother     No Known Problems Maternal Grandfather     No Known Problems Paternal Grandmother     No Known Problems Paternal Grandfather     No Known Problems Maternal Aunt     No Known Problems Maternal Aunt     No Known Problems Paternal Aunt     Breast cancer Neg Hx      Past Surgical History:   Procedure Laterality Date    APPENDECTOMY      BREAST BIOPSY Right 1992    negative     SECTION      x 3    CHOLECYSTECTOMY      COLONOSCOPY      EYE SURGERY      HERNIA REPAIR      HYSTERECTOMY      MN LAP RPR HRNA XCPT INCAL/INGUN NCRC8/STRANGULATED N/A 2019    Procedure: REPAIR HERNIA INCISIONAL LAPAROSCOPIC W/ ROBOTICS;  Surgeon: Sadia Sanchez MD;  Location: [facility];  Service: General    UPPER GASTROINTESTINAL ENDOSCOPY         PREVIOUS WEIGHTS:   Wt Readings from Last 10 Encounters:   25 77 kg (169 lb 12.8 oz)   24 76.8 kg (169 lb 6.4 oz)   10/30/24 75.1 kg (165 lb 9.6 oz)   24 76.1 kg (167 lb  12.8 oz)   08/26/24 77.1 kg (170 lb)   08/02/24 76.4 kg (168 lb 6.4 oz)   06/25/24 76.2 kg (168 lb)   03/22/24 76.2 kg (168 lb)   02/23/24 76.8 kg (169 lb 6.4 oz)   02/22/24 76.7 kg (169 lb)        Review of Systems:  Review of Systems   Respiratory:  Negative for cough, choking, chest tightness, shortness of breath and wheezing.    Cardiovascular:  Negative for chest pain, palpitations and leg swelling.   Musculoskeletal:  Negative for gait problem.   Skin:  Negative for rash.   Neurological:  Negative for dizziness, tremors, syncope, weakness, light-headedness, numbness and headaches.   Psychiatric/Behavioral:  Negative for agitation and behavioral problems. The patient is not hyperactive.        Physical Exam:  /70 (BP Location: Left arm, Patient Position: Sitting, Cuff Size: Adult)   Pulse 74   Wt 77 kg (169 lb 12.8 oz)   BMI 29.61 kg/m²     Physical Exam  Constitutional:       General: She is not in acute distress.     Appearance: She is well-developed.   HENT:      Head: Normocephalic and atraumatic.   Neck:      Thyroid: No thyromegaly.      Vascular: No carotid bruit or JVD.      Trachea: No tracheal deviation.   Cardiovascular:      Rate and Rhythm: Normal rate and regular rhythm.      Pulses: Normal pulses.      Heart sounds: Normal heart sounds. No murmur heard.     No friction rub. No gallop.   Pulmonary:      Effort: Pulmonary effort is normal. No respiratory distress.      Breath sounds: Normal breath sounds. No wheezing, rhonchi or rales.   Chest:      Chest wall: No tenderness.   Musculoskeletal:         General: Normal range of motion.      Cervical back: Normal range of motion and neck supple.      Right lower leg: No edema.      Left lower leg: No edema.   Skin:     General: Skin is warm and dry.   Neurological:      General: No focal deficit present.      Mental Status: She is alert and oriented to person, place, and time.   Psychiatric:         Mood and Affect: Mood normal.          "Behavior: Behavior normal.         Thought Content: Thought content normal.         Judgment: Judgment normal.         ======================================================  Imaging:   Results Review Statement: No pertinent imaging studies reviewed.    Portions of the record may have been created with voice recognition software. Occasional wrong word or \"sound a like\" substitutions may have occurred due to the inherent limitations of voice recognition software. Read the chart carefully and recognize, using context, where substitutions have occurred.    SIGNATURES:   Harry Quach MD   "

## 2025-02-01 ENCOUNTER — APPOINTMENT (OUTPATIENT)
Dept: LAB | Facility: HOSPITAL | Age: 65
End: 2025-02-01
Payer: MEDICARE

## 2025-02-01 DIAGNOSIS — G47.09 OTHER INSOMNIA: ICD-10-CM

## 2025-02-01 DIAGNOSIS — J45.20 MILD INTERMITTENT ASTHMA WITHOUT COMPLICATION: ICD-10-CM

## 2025-02-01 DIAGNOSIS — E78.00 PURE HYPERCHOLESTEROLEMIA: ICD-10-CM

## 2025-02-01 DIAGNOSIS — N18.2 TYPE 2 DIABETES MELLITUS WITH STAGE 2 CHRONIC KIDNEY DISEASE, WITHOUT LONG-TERM CURRENT USE OF INSULIN  (HCC): ICD-10-CM

## 2025-02-01 DIAGNOSIS — E03.9 ACQUIRED HYPOTHYROIDISM: ICD-10-CM

## 2025-02-01 DIAGNOSIS — E11.22 TYPE 2 DIABETES MELLITUS WITH STAGE 2 CHRONIC KIDNEY DISEASE, WITHOUT LONG-TERM CURRENT USE OF INSULIN  (HCC): ICD-10-CM

## 2025-02-01 DIAGNOSIS — Z13.220 SCREENING, LIPID: ICD-10-CM

## 2025-02-01 DIAGNOSIS — E11.9 TYPE 2 DIABETES MELLITUS WITHOUT COMPLICATION, WITHOUT LONG-TERM CURRENT USE OF INSULIN (HCC): ICD-10-CM

## 2025-02-01 LAB
ALBUMIN SERPL BCG-MCNC: 4.4 G/DL (ref 3.5–5)
ALP SERPL-CCNC: 87 U/L (ref 34–104)
ALT SERPL W P-5'-P-CCNC: 22 U/L (ref 7–52)
ANION GAP SERPL CALCULATED.3IONS-SCNC: 4 MMOL/L (ref 4–13)
AST SERPL W P-5'-P-CCNC: 21 U/L (ref 13–39)
BASOPHILS # BLD AUTO: 0.03 THOUSANDS/ΜL (ref 0–0.1)
BASOPHILS NFR BLD AUTO: 0 % (ref 0–1)
BILIRUB SERPL-MCNC: 0.76 MG/DL (ref 0.2–1)
BUN SERPL-MCNC: 15 MG/DL (ref 5–25)
CALCIUM SERPL-MCNC: 9.5 MG/DL (ref 8.4–10.2)
CHLORIDE SERPL-SCNC: 107 MMOL/L (ref 96–108)
CHOLEST SERPL-MCNC: 152 MG/DL (ref ?–200)
CO2 SERPL-SCNC: 30 MMOL/L (ref 21–32)
CREAT SERPL-MCNC: 0.74 MG/DL (ref 0.6–1.3)
CREAT UR-MCNC: 210.6 MG/DL
EOSINOPHIL # BLD AUTO: 0.26 THOUSAND/ΜL (ref 0–0.61)
EOSINOPHIL NFR BLD AUTO: 4 % (ref 0–6)
ERYTHROCYTE [DISTWIDTH] IN BLOOD BY AUTOMATED COUNT: 12.1 % (ref 11.6–15.1)
EST. AVERAGE GLUCOSE BLD GHB EST-MCNC: 160 MG/DL
GFR SERPL CREATININE-BSD FRML MDRD: 85 ML/MIN/1.73SQ M
GLUCOSE P FAST SERPL-MCNC: 133 MG/DL (ref 65–99)
HBA1C MFR BLD: 7.2 %
HCT VFR BLD AUTO: 43.3 % (ref 34.8–46.1)
HDLC SERPL-MCNC: 48 MG/DL
HGB BLD-MCNC: 14.5 G/DL (ref 11.5–15.4)
IMM GRANULOCYTES # BLD AUTO: 0 THOUSAND/UL (ref 0–0.2)
IMM GRANULOCYTES NFR BLD AUTO: 0 % (ref 0–2)
LDLC SERPL CALC-MCNC: 84 MG/DL (ref 0–100)
LYMPHOCYTES # BLD AUTO: 2.96 THOUSANDS/ΜL (ref 0.6–4.47)
LYMPHOCYTES NFR BLD AUTO: 44 % (ref 14–44)
MCH RBC QN AUTO: 29.5 PG (ref 26.8–34.3)
MCHC RBC AUTO-ENTMCNC: 33.5 G/DL (ref 31.4–37.4)
MCV RBC AUTO: 88 FL (ref 82–98)
MICROALBUMIN UR-MCNC: 12 MG/L
MICROALBUMIN/CREAT 24H UR: 6 MG/G CREATININE (ref 0–30)
MONOCYTES # BLD AUTO: 0.44 THOUSAND/ΜL (ref 0.17–1.22)
MONOCYTES NFR BLD AUTO: 7 % (ref 4–12)
NEUTROPHILS # BLD AUTO: 3.04 THOUSANDS/ΜL (ref 1.85–7.62)
NEUTS SEG NFR BLD AUTO: 45 % (ref 43–75)
NRBC BLD AUTO-RTO: 0 /100 WBCS
PLATELET # BLD AUTO: 218 THOUSANDS/UL (ref 149–390)
PMV BLD AUTO: 10.8 FL (ref 8.9–12.7)
POTASSIUM SERPL-SCNC: 3.9 MMOL/L (ref 3.5–5.3)
PROT SERPL-MCNC: 7.1 G/DL (ref 6.4–8.4)
RBC # BLD AUTO: 4.91 MILLION/UL (ref 3.81–5.12)
SODIUM SERPL-SCNC: 141 MMOL/L (ref 135–147)
TRIGL SERPL-MCNC: 98 MG/DL (ref ?–150)
TSH SERPL DL<=0.05 MIU/L-ACNC: 0.82 UIU/ML (ref 0.45–4.5)
WBC # BLD AUTO: 6.73 THOUSAND/UL (ref 4.31–10.16)

## 2025-02-01 PROCEDURE — 83036 HEMOGLOBIN GLYCOSYLATED A1C: CPT

## 2025-02-01 PROCEDURE — 82043 UR ALBUMIN QUANTITATIVE: CPT

## 2025-02-01 PROCEDURE — 85025 COMPLETE CBC W/AUTO DIFF WBC: CPT

## 2025-02-01 PROCEDURE — 80053 COMPREHEN METABOLIC PANEL: CPT

## 2025-02-01 PROCEDURE — 80061 LIPID PANEL: CPT

## 2025-02-01 PROCEDURE — 82570 ASSAY OF URINE CREATININE: CPT

## 2025-02-01 PROCEDURE — 84443 ASSAY THYROID STIM HORMONE: CPT

## 2025-02-01 PROCEDURE — 36415 COLL VENOUS BLD VENIPUNCTURE: CPT

## 2025-02-04 ENCOUNTER — OFFICE VISIT (OUTPATIENT)
Dept: FAMILY MEDICINE CLINIC | Facility: CLINIC | Age: 65
End: 2025-02-04
Payer: MEDICARE

## 2025-02-04 VITALS
BODY MASS INDEX: 29.4 KG/M2 | WEIGHT: 172.2 LBS | HEIGHT: 64 IN | HEART RATE: 68 BPM | SYSTOLIC BLOOD PRESSURE: 138 MMHG | TEMPERATURE: 96.5 F | DIASTOLIC BLOOD PRESSURE: 82 MMHG | OXYGEN SATURATION: 97 %

## 2025-02-04 DIAGNOSIS — E03.9 ACQUIRED HYPOTHYROIDISM: ICD-10-CM

## 2025-02-04 DIAGNOSIS — N18.2 TYPE 2 DIABETES MELLITUS WITH STAGE 2 CHRONIC KIDNEY DISEASE, WITHOUT LONG-TERM CURRENT USE OF INSULIN  (HCC): ICD-10-CM

## 2025-02-04 DIAGNOSIS — E11.22 TYPE 2 DIABETES MELLITUS WITH STAGE 2 CHRONIC KIDNEY DISEASE, WITHOUT LONG-TERM CURRENT USE OF INSULIN  (HCC): ICD-10-CM

## 2025-02-04 DIAGNOSIS — I83.812 VARICOSE VEINS OF LEFT LOWER EXTREMITY WITH PAIN: ICD-10-CM

## 2025-02-04 DIAGNOSIS — E78.2 MIXED HYPERLIPIDEMIA: Primary | ICD-10-CM

## 2025-02-04 PROCEDURE — G2211 COMPLEX E/M VISIT ADD ON: HCPCS | Performed by: FAMILY MEDICINE

## 2025-02-04 PROCEDURE — 99214 OFFICE O/P EST MOD 30 MIN: CPT | Performed by: FAMILY MEDICINE

## 2025-02-04 RX ORDER — LEVOTHYROXINE SODIUM 112 UG/1
112 TABLET ORAL
Qty: 90 TABLET | Refills: 0 | Status: SHIPPED | OUTPATIENT
Start: 2025-02-04

## 2025-02-04 RX ORDER — ATORVASTATIN CALCIUM 20 MG/1
20 TABLET, FILM COATED ORAL DAILY
Qty: 90 TABLET | Refills: 1 | Status: CANCELLED | OUTPATIENT
Start: 2025-02-04

## 2025-02-04 RX ORDER — METFORMIN HYDROCHLORIDE 500 MG/1
500 TABLET, EXTENDED RELEASE ORAL
Qty: 90 TABLET | Refills: 1 | Status: SHIPPED | OUTPATIENT
Start: 2025-02-04

## 2025-02-04 RX ORDER — ATORVASTATIN CALCIUM 40 MG/1
40 TABLET, FILM COATED ORAL DAILY
Qty: 30 TABLET | Refills: 5 | Status: SHIPPED | OUTPATIENT
Start: 2025-02-04

## 2025-02-04 NOTE — ASSESSMENT & PLAN NOTE
Chronic patient already evaluated by vascular surgeon before declined any procedure discussed with patient the risk of a clot risk of bleeding recommend to wear compression stocking  Orders:  •  CBC and differential; Future  •  Comprehensive metabolic panel; Future  •  Lipid panel; Future  •  TSH, 3rd generation with Free T4 reflex; Future  •  Hemoglobin A1C; Future

## 2025-02-04 NOTE — ASSESSMENT & PLAN NOTE
Chronic slight increase in the hemoglobin A1c compared with before patient compliant with the medication but has not been compliant with the low carb diet discussed the patient compliant with the low-carb diet important lose weight if persistent to be uncontrolled we will add another medication  Lab Results   Component Value Date    HGBA1C 7.2 (H) 02/01/2025     Orders:  •  metFORMIN (GLUCOPHAGE-XR) 500 mg 24 hr tablet; Take 1 tablet (500 mg total) by mouth daily with dinner  •  CBC and differential; Future  •  Comprehensive metabolic panel; Future  •  Lipid panel; Future  •  TSH, 3rd generation with Free T4 reflex; Future  •  Hemoglobin A1C; Future

## 2025-02-04 NOTE — PROGRESS NOTES
Diabetic Foot Exam    Patient's shoes and socks removed.    Right Foot/Ankle   Right Foot Inspection  Skin Exam: skin intact. No warmth and no pre-ulcer.     Toe Exam: No swelling and erythema    Sensory   Monofilament testing: intact    Vascular  Capillary refills: < 3 seconds  The right DP pulse is 2+.     Left Foot/Ankle  Left Foot Inspection  Skin Exam: skin intact. No warmth and no pre-ulcer.     Toe Exam: No swelling and no erythema.     Sensory   Monofilament testing: intact    Vascular  Capillary refills: < 3 seconds  The left DP pulse is 2+.     Assign Risk Category  No deformity present  No loss of protective sensation  No weak pulses  Risk: 0  Name: Esha Oh      : 1960      MRN: [id number]  Encounter Provider: Shirley Almonte MD  Encounter Date: 2025   Encounter department: [facility]  :  Assessment & Plan  Mixed hyperlipidemia  Chronic asymptomatic LDL subtherapeutic in diabetic patient recommend to increase atorvastatin to 40 mg once a day  Orders:  •  atorvastatin (LIPITOR) 40 mg tablet; Take 1 tablet (40 mg total) by mouth daily  •  CBC and differential; Future  •  Comprehensive metabolic panel; Future  •  Lipid panel; Future  •  TSH, 3rd generation with Free T4 reflex; Future  •  Hemoglobin A1C; Future    Acquired hypothyroidism  Chronic asymptomatic fair control continue the levothyroxine 112 mcg once a day  Orders:  •  levothyroxine 112 mcg tablet; Take 1 tablet (112 mcg total) by mouth daily in the early morning  •  CBC and differential; Future  •  Comprehensive metabolic panel; Future  •  Lipid panel; Future  •  TSH, 3rd generation with Free T4 reflex; Future  •  Hemoglobin A1C; Future    Type 2 diabetes mellitus with stage 2 chronic kidney disease, without long-term current use of insulin  (HCC)  Chronic slight increase in the hemoglobin A1c compared with before patient compliant with the medication but has not been compliant with the low  carb diet discussed the patient compliant with the low-carb diet important lose weight if persistent to be uncontrolled we will add another medication  Lab Results   Component Value Date    HGBA1C 7.2 (H) 02/01/2025     Orders:  •  metFORMIN (GLUCOPHAGE-XR) 500 mg 24 hr tablet; Take 1 tablet (500 mg total) by mouth daily with dinner  •  CBC and differential; Future  •  Comprehensive metabolic panel; Future  •  Lipid panel; Future  •  TSH, 3rd generation with Free T4 reflex; Future  •  Hemoglobin A1C; Future    Varicose veins of left lower extremity with pain  Chronic patient already evaluated by vascular surgeon before declined any procedure discussed with patient the risk of a clot risk of bleeding recommend to wear compression stocking  Orders:  •  CBC and differential; Future  •  Comprehensive metabolic panel; Future  •  Lipid panel; Future  •  TSH, 3rd generation with Free T4 reflex; Future  •  Hemoglobin A1C; Future          Depression Screening and Follow-up Plan: Patient was screened for depression during today's encounter. They screened negative with a PHQ-2 score of 0.      History of Present Illness   Patient here follow-up with a chronic condition compliant with the medication tolerated well without side effect no new concern recent blood work discussed with patient      Review of Systems   Constitutional:  Negative for chills and fever.   HENT:  Negative for ear pain and sore throat.    Eyes:  Negative for pain and visual disturbance.   Respiratory:  Negative for cough and shortness of breath.    Cardiovascular:  Negative for chest pain and palpitations.   Gastrointestinal:  Negative for abdominal pain, constipation, diarrhea and vomiting.   Genitourinary:  Negative for dysuria and hematuria.   Musculoskeletal:  Negative for gait problem.   Skin:  Negative for color change and rash.   Neurological:  Negative for seizures and syncope.   All other systems reviewed and are negative.      Objective   BP  "138/82 (BP Location: Left arm, Patient Position: Sitting, Cuff Size: Standard)   Pulse 68   Temp (!) 96.5 °F (35.8 °C) (Tympanic)   Ht 5' 3.5\" (1.613 m)   Wt 78.1 kg (172 lb 3.2 oz)   SpO2 97%   BMI 30.03 kg/m²      Physical Exam  Vitals and nursing note reviewed.   Constitutional:       General: She is not in acute distress.     Appearance: She is well-developed. She is not diaphoretic.   HENT:      Head: Normocephalic.      Right Ear: Tympanic membrane and external ear normal.      Left Ear: Tympanic membrane and external ear normal.      Nose: No rhinorrhea.      Mouth/Throat:      Pharynx: No posterior oropharyngeal erythema.   Eyes:      General:         Right eye: No discharge.         Left eye: No discharge.      Conjunctiva/sclera: Conjunctivae normal.   Neck:      Vascular: No JVD.   Cardiovascular:      Rate and Rhythm: Normal rate and regular rhythm.      Pulses: no weak pulses.           Dorsalis pedis pulses are 2+ on the right side and 2+ on the left side.      Heart sounds: Normal heart sounds. No murmur heard.     No gallop.      Comments: Varicose vein and left lower extremity  Pulmonary:      Effort: Pulmonary effort is normal. No respiratory distress.      Breath sounds: Normal breath sounds. No wheezing.   Abdominal:      General: There is no distension.      Palpations: Abdomen is soft.      Tenderness: There is no abdominal tenderness. There is no rebound.   Musculoskeletal:         General: No tenderness.      Cervical back: Normal range of motion and neck supple.   Feet:      Right foot:      Skin integrity: No warmth.      Left foot:      Skin integrity: No warmth.   Lymphadenopathy:      Cervical: No cervical adenopathy.   Skin:     General: Skin is warm.      Findings: No rash.   Neurological:      Mental Status: She is alert and oriented to person, place, and time.         "

## 2025-02-04 NOTE — ASSESSMENT & PLAN NOTE
Lab Results   Component Value Date    HGBA1C 7.2 (H) 02/01/2025     Orders:  •  metFORMIN (GLUCOPHAGE-XR) 500 mg 24 hr tablet; Take 1 tablet (500 mg total) by mouth daily with dinner

## 2025-02-04 NOTE — ASSESSMENT & PLAN NOTE
Chronic asymptomatic fair control continue the levothyroxine 112 mcg once a day  Orders:  •  levothyroxine 112 mcg tablet; Take 1 tablet (112 mcg total) by mouth daily in the early morning  •  CBC and differential; Future  •  Comprehensive metabolic panel; Future  •  Lipid panel; Future  •  TSH, 3rd generation with Free T4 reflex; Future  •  Hemoglobin A1C; Future

## 2025-02-21 ENCOUNTER — EVALUATION (OUTPATIENT)
Dept: PHYSICAL THERAPY | Facility: CLINIC | Age: 65
End: 2025-02-21
Payer: MEDICARE

## 2025-02-21 VITALS — SYSTOLIC BLOOD PRESSURE: 130 MMHG | DIASTOLIC BLOOD PRESSURE: 71 MMHG

## 2025-02-21 DIAGNOSIS — M47.816 OSTEOARTHRITIS OF LUMBAR SPINE, UNSPECIFIED SPINAL OSTEOARTHRITIS COMPLICATION STATUS: ICD-10-CM

## 2025-02-21 PROCEDURE — 97530 THERAPEUTIC ACTIVITIES: CPT | Performed by: PHYSICAL THERAPIST

## 2025-02-21 PROCEDURE — 97010 HOT OR COLD PACKS THERAPY: CPT | Performed by: PHYSICAL THERAPIST

## 2025-02-21 PROCEDURE — 97162 PT EVAL MOD COMPLEX 30 MIN: CPT | Performed by: PHYSICAL THERAPIST

## 2025-02-21 NOTE — PROGRESS NOTES
PT Evaluation     Today's date: 2025  Patient name: Esha Oh  : 1960  MRN: [id number]  Referring provider: Shirley Almonte MD  Dx:   Encounter Diagnosis     ICD-10-CM    1. Osteoarthritis of lumbar spine, unspecified spinal osteoarthritis complication status  M47.816 Ambulatory Referral to Physical Therapy          Start Time: 1338  Stop Time: 1435  Total time in clinic (min): 57 minutes    Assessment  Impairments: abnormal muscle tone, abnormal or restricted ROM, abnormal movement, activity intolerance, impaired physical strength, lacks appropriate home exercise program, pain with function, poor posture , unable to perform ADL and activity limitations  Barriers to therapy: Pt is a 64 y.o. year old female presenting to physical therapy for osteoarthritis of lumbar spine, unspecified spinal osteoarthritis complication status. She presents with the following impairments: lumbar ROM limitations, pain with movement, R paraspinal hypertonicity, thoracic and lumbar hypomobility, TTP: R paraspinals, lumbar SP affecting their function with sitting, twisting, lifting, bending, and walking. R paraspinal pain improved to 5/10 pain intensity post MHP and STM. Pt will benefit from skilled physical therapy with mobility and stability based program to meet patient goals.    Evaluation completed by Alessandro Sanchez DPT.                Understanding of Dx/Px/POC: good     Prognosis: good    Goals  STG  1.Patient will demonstrate independence with HEP  2. Patient will have improved pain to 2/10 at worst within 2 weeks  3. Patient will demonstrate good TA activation  LTG  1.Patient will meet expected FOTO score  2.Patient will be able to sleep for 8 hours without increased pain  3. Patient will be able to sit and watch TV for 1 hour without increase in back pain    Plan  Patient would benefit from: PT eval and skilled physical therapy  Planned modality interventions: TENS, thermotherapy: hydrocollator packs and  cryotherapy    Frequency: 1x week  Duration in weeks: 4        Subjective Evaluation    History of Present Illness  Mechanism of injury: Patient with chronic kidney disease presents with acute onset R mid-back pain, reports going to express care yesterday after R back pain worsened with onset yesterday morning, prescribed methocarbamol and naproxen with no relief. Patient reports 4 hours of relief post injection. Patient reports difficulty sleeping due to pain. Patient reports no relieving factors. Patient reports constant pain, no relief with changes in position. Patient reports chronic kidney disease is well managed. Patient denies radiating symptoms down LE, denies recent changes to medical history, denies falls, denies bowel or bladder changes.   Quality of life: good    Pain  Current pain ratin  At worst pain rating: 10  Quality: sharp  Aggravating factors: running, lifting and walking          Objective     Concurrent Complaints  Positive for night pain and disturbed sleep. Negative for bladder dysfunction, bowel dysfunction and saddle (S4) numbness    Postural Observations  Seated posture: poor  Standing posture: poor      Palpation   Left   No palpable tenderness to the erector spinae, internal abdominal oblique, lumbar paraspinals and transverse abdominus.     Right   No palpable tenderness to the internal abdominal oblique and transverse abdominus.   Hypertonic in the lumbar paraspinals.   Tenderness of the erector spinae and lumbar paraspinals.     Tenderness     Lumbar Spine  Tenderness in the spinous process.     Left Hip   No tenderness in the ASIS and PSIS.     Right Hip   No tenderness in the ASIS and PSIS.     Additional Tenderness Details  Lumbar spine SP TTP    Active Range of Motion     Lumbar   Flexion:  Restriction level: minimal  Extension:  with pain Restriction level: moderate  Left lateral flexion:  with pain Restriction level: minimal  Right lateral flexion:  WFL  Left rotation:   "Restriction level: minimal  Right rotation:  with pain Restriction level: minimal    Joint Play     Hypomobile: T8, T9, T10, T11, T12, L1, L2, L3, L4, L5 and S1     Pain: T12, L1, L2, L3, L4, L5 and S1     Strength/Myotome Testing     Left Hip   Planes of Motion   Flexion: 4+  Extension: 3+  Abduction: 4+  External rotation: 4+  Internal rotation: 4+    Right Hip   Planes of Motion   Flexion: 4+  Extension: 3+  Abduction: 4+  External rotation: 4+  Internal rotation: 4+    Left Knee   Flexion: 4+  Extension: 5    Right Knee   Flexion: 4+  Extension: 5    Left Ankle/Foot   Dorsiflexion: 5    Right Ankle/Foot   Dorsiflexion: 5    Muscle Activation   Patient unable to activate left transverse abdominals, left multifidus, right transverse abdominals and right multifidus.     Additional Muscle Activation Details  Poor TA activation and multifidi activation b/l    Tests     Lumbar   Negative SIJ compression and sacral thrust .     Left   Negative passive SLR and slump test.     Right   Negative passive SLR and slump test.     Left Hip   Negative SOBIA and FADIR.     Right Hip   Negative SOBIA and FADIR.       Flowsheet Rows      Flowsheet Row Most Recent Value   PT/OT G-Codes    Current Score 65   Projected Score 82               Precautions: [place] speaking      Date 2/21            Visit # IE            FOTO IE             Re-eval IE                 Manuals 2/21            R paraspinal STM ND            Lumbar rotation mob nv                                      Neuro Re-Ed 2/21            TA brace 5x5\"             Bridges nv            Paloff press nv            UTR             3 way hip                                       Ther Ex 2/21            Bike nv            QL str 3x10\" ea            SKTC 5x10\"            Leg press             Lucho pose nv            Pt edu ND            LTR nv                         Ther Activity 2/21                                      Gait Training 2/21                                 "      Modalities 2/21            MHP R lumbar and flank 8'

## 2025-02-26 ENCOUNTER — APPOINTMENT (OUTPATIENT)
Dept: PHYSICAL THERAPY | Facility: CLINIC | Age: 65
End: 2025-02-26
Payer: MEDICARE

## 2025-02-27 ENCOUNTER — HOSPITAL ENCOUNTER (OUTPATIENT)
Dept: MAMMOGRAPHY | Facility: CLINIC | Age: 65
End: 2025-02-27
Payer: MEDICARE

## 2025-02-27 VITALS — HEIGHT: 64 IN | BODY MASS INDEX: 28.68 KG/M2 | WEIGHT: 168 LBS

## 2025-02-27 DIAGNOSIS — E78.2 MIXED HYPERLIPIDEMIA: ICD-10-CM

## 2025-02-27 DIAGNOSIS — R92.8 ABNORMAL MAMMOGRAM: ICD-10-CM

## 2025-02-27 PROCEDURE — 76642 ULTRASOUND BREAST LIMITED: CPT

## 2025-02-27 PROCEDURE — G0279 TOMOSYNTHESIS, MAMMO: HCPCS

## 2025-02-27 PROCEDURE — 77066 DX MAMMO INCL CAD BI: CPT

## 2025-02-28 ENCOUNTER — OFFICE VISIT (OUTPATIENT)
Dept: PHYSICAL THERAPY | Facility: CLINIC | Age: 65
End: 2025-02-28
Payer: MEDICARE

## 2025-02-28 ENCOUNTER — RESULTS FOLLOW-UP (OUTPATIENT)
Dept: FAMILY MEDICINE CLINIC | Facility: CLINIC | Age: 65
End: 2025-02-28

## 2025-02-28 DIAGNOSIS — M47.816 OSTEOARTHRITIS OF LUMBAR SPINE, UNSPECIFIED SPINAL OSTEOARTHRITIS COMPLICATION STATUS: Primary | ICD-10-CM

## 2025-02-28 DIAGNOSIS — Z12.31 ENCOUNTER FOR SCREENING MAMMOGRAM FOR BREAST CANCER: Primary | ICD-10-CM

## 2025-02-28 PROCEDURE — 97112 NEUROMUSCULAR REEDUCATION: CPT | Performed by: PHYSICAL THERAPIST

## 2025-02-28 PROCEDURE — 97140 MANUAL THERAPY 1/> REGIONS: CPT | Performed by: PHYSICAL THERAPIST

## 2025-02-28 PROCEDURE — 97110 THERAPEUTIC EXERCISES: CPT | Performed by: PHYSICAL THERAPIST

## 2025-02-28 RX ORDER — ATORVASTATIN CALCIUM 40 MG/1
40 TABLET, FILM COATED ORAL DAILY
Qty: 90 TABLET | Refills: 1 | Status: SHIPPED | OUTPATIENT
Start: 2025-02-28

## 2025-02-28 NOTE — PROGRESS NOTES
"Daily Note     Today's date: 2025  Patient name: Esha Oh  : 1960  MRN: [id number]  Referring provider: Shirley Almonte MD  Dx:   Encounter Diagnosis     ICD-10-CM    1. Osteoarthritis of lumbar spine, unspecified spinal osteoarthritis complication status  M47.816           Start Time: 1000  Stop Time: 1045  Total time in clinic (min): 45 minutes    Subjective: Patient reports 3/10 back pain today.      Objective: See treatment diary below      Assessment: Patient started leg press and exhibited good form without increase in back pain. Patient started bridges and experienced minimal back pain, exercise continued without further increase in pain. Patient reports greater QL str on L compared to R and increased tenderness of R paraspinals compared to L despite increased hypertrophy and hypertonicity of L paraspinals. Patient experienced back pain with standing ext, exhibits poor core stabilization and excessive lumbar lordosis, pt would benefit from continues core strengthening. Patient demonstrated fatigue post treatment, exhibited good technique with therapeutic exercises, and would benefit from continued PT      Plan: Continue per plan of care.  Progress treatment as tolerated.       Precautions: [place] speaking      Date            Visit # IE 2           FOTO IE             Re-eval IE                 Manuals            R paraspinal STM ND ND b/l           Lumbar rotation mob nv ND b/l                                     Neuro Re-Ed            TA brace 5x5\"  2x10 5\"           Bridges nv 2x10 3\"           Paloff press nv Held, shoulder pn!           UTR             3 way hip  2x10 abd, ext held pn!           Core stabilization with kb [place]  nv           Bird dog  nv           Ther Ex            Bike nv            QL str 3x10\" ea 3x20\" ea           SKTC 5x10\" 10x10\"           Leg press             Lucho pose nv Deferred, knee pain           Pt edu ND         " "   LTR nv 10x5\" ea                        Ther Activity 2/21 2/28           Squats  nv                        Gait Training 2/21 2/28                                     Modalities 2/21 2/28           MHP R lumbar and flank 8' deferred                             "

## 2025-02-28 NOTE — TELEPHONE ENCOUNTER
----- Message from Shirley Almonte MD sent at 2/28/2025 11:02 AM EST -----  No evidence of malignancy.  Repeat screen mammogram in 1 year

## 2025-03-05 ENCOUNTER — APPOINTMENT (OUTPATIENT)
Dept: PHYSICAL THERAPY | Facility: CLINIC | Age: 65
End: 2025-03-05
Payer: MEDICARE

## 2025-03-12 ENCOUNTER — OFFICE VISIT (OUTPATIENT)
Dept: PHYSICAL THERAPY | Facility: CLINIC | Age: 65
End: 2025-03-12
Payer: MEDICARE

## 2025-03-12 DIAGNOSIS — M47.816 OSTEOARTHRITIS OF LUMBAR SPINE, UNSPECIFIED SPINAL OSTEOARTHRITIS COMPLICATION STATUS: Primary | ICD-10-CM

## 2025-03-12 PROCEDURE — 97110 THERAPEUTIC EXERCISES: CPT | Performed by: PHYSICAL THERAPIST

## 2025-03-12 PROCEDURE — 97140 MANUAL THERAPY 1/> REGIONS: CPT | Performed by: PHYSICAL THERAPIST

## 2025-03-12 PROCEDURE — 97010 HOT OR COLD PACKS THERAPY: CPT | Performed by: PHYSICAL THERAPIST

## 2025-03-12 PROCEDURE — 97112 NEUROMUSCULAR REEDUCATION: CPT | Performed by: PHYSICAL THERAPIST

## 2025-03-12 NOTE — PROGRESS NOTES
"Daily Note     Today's date: 3/12/2025  Patient name: Esha Oh  : 1960  MRN: [id number]  Referring provider: Shirley Almonte MD  Dx:   Encounter Diagnosis     ICD-10-CM    1. Osteoarthritis of lumbar spine, unspecified spinal osteoarthritis complication status  M47.816           Start Time: 1352  Stop Time: 1445  Total time in clinic (min): 53 minutes    Subjective: Patient reports improvement in R sided thoracic and LBP and cancelled her previous because she was feeling good. Patient reports new onset L glute  pain at 8/10 intensity 3 days ago.      Objective: See treatment diary below      Assessment: Patient experienced improvement in pain levels post session to 5/10. Patient started paloff press and exhibits some hip flexion and reaching, cues provided to improve form. Patient started leg press and core stabilization with KB circles and exhibits appropriate challenge with no increase in pain. Updated HEP to TA brace, bridges, LTR, SKTC, and bird dog LE only, printout provided, educated pt on modification of bridges pending irritability with exercise. Patient sleeps in R s/l, discussed using pillow between legs to maintain neutral hip position. Patient demonstrated fatigue post treatment, exhibited good technique with therapeutic exercises, and would benefit from continued PT      Plan: Continue per plan of care.  Progress treatment as tolerated.       Precautions: [place] speaking      Date 2/21 2/28 3/12          Visit # IE 2 3          FOTO IE             Re-eval IE                 Manuals 2/21 2/28 3/12          R paraspinal STM ND ND b/l           Lumbar rotation mob nv ND b/l           Glute STM   ND w/ roller L glute                       Neuro Re-Ed 2/21 2/28 3/12          TA brace 5x5\"  2x10 5\" 2x10 5\"          Bridges nv 2x10 3\" Pn! held          Paloff press nv Held, shoulder pn! 10x ea 6#          UTR             3 way hip  2x10 abd, ext held pn! 20x abd and ext          Core " "stabilization with kb [place]  nv 20x cw and ccw 15# kb          Bird dog  nv np          Ther Ex 2/21 2/28 3/12          Bike nv            QL str 3x10\" ea 3x20\" ea           SKTC 5x10\" 10x10\" 10x10\"          Leg press   1x10 85#  2x10 95#          Lucho pose nv Deferred, knee pain           Pt edu ND  ND          LTR nv 10x5\" ea 15x5\"          Piriformis str   3x20\" ea          Ther Activity 2/21 2/28 3/12          Squats  nv                        Gait Training 2/21 2/28                                     Modalities 2/21 2/28 3/12          MHP R lumbar and flank 8' deferred 10' L s/l and w/ TE                              "

## 2025-05-02 ENCOUNTER — APPOINTMENT (OUTPATIENT)
Dept: LAB | Facility: HOSPITAL | Age: 65
End: 2025-05-02
Payer: MEDICARE

## 2025-05-02 DIAGNOSIS — N18.2 TYPE 2 DIABETES MELLITUS WITH STAGE 2 CHRONIC KIDNEY DISEASE, WITHOUT LONG-TERM CURRENT USE OF INSULIN  (HCC): ICD-10-CM

## 2025-05-02 DIAGNOSIS — E78.2 MIXED HYPERLIPIDEMIA: ICD-10-CM

## 2025-05-02 DIAGNOSIS — E03.9 ACQUIRED HYPOTHYROIDISM: ICD-10-CM

## 2025-05-02 DIAGNOSIS — I83.812 VARICOSE VEINS OF LEFT LOWER EXTREMITY WITH PAIN: ICD-10-CM

## 2025-05-02 DIAGNOSIS — E11.22 TYPE 2 DIABETES MELLITUS WITH STAGE 2 CHRONIC KIDNEY DISEASE, WITHOUT LONG-TERM CURRENT USE OF INSULIN  (HCC): ICD-10-CM

## 2025-05-02 LAB
ALBUMIN SERPL BCG-MCNC: 4.2 G/DL (ref 3.5–5)
ALP SERPL-CCNC: 86 U/L (ref 34–104)
ALT SERPL W P-5'-P-CCNC: 16 U/L (ref 7–52)
ANION GAP SERPL CALCULATED.3IONS-SCNC: 6 MMOL/L (ref 4–13)
AST SERPL W P-5'-P-CCNC: 18 U/L (ref 13–39)
BASOPHILS # BLD AUTO: 0.02 THOUSANDS/ÂΜL (ref 0–0.1)
BASOPHILS NFR BLD AUTO: 0 % (ref 0–1)
BILIRUB SERPL-MCNC: 0.91 MG/DL (ref 0.2–1)
BUN SERPL-MCNC: 17 MG/DL (ref 5–25)
CALCIUM SERPL-MCNC: 9.7 MG/DL (ref 8.4–10.2)
CHLORIDE SERPL-SCNC: 104 MMOL/L (ref 96–108)
CHOLEST SERPL-MCNC: 139 MG/DL (ref ?–200)
CO2 SERPL-SCNC: 29 MMOL/L (ref 21–32)
CREAT SERPL-MCNC: 0.79 MG/DL (ref 0.6–1.3)
EOSINOPHIL # BLD AUTO: 0.24 THOUSAND/ÂΜL (ref 0–0.61)
EOSINOPHIL NFR BLD AUTO: 3 % (ref 0–6)
ERYTHROCYTE [DISTWIDTH] IN BLOOD BY AUTOMATED COUNT: 12.1 % (ref 11.6–15.1)
EST. AVERAGE GLUCOSE BLD GHB EST-MCNC: 154 MG/DL
GFR SERPL CREATININE-BSD FRML MDRD: 78 ML/MIN/1.73SQ M
GLUCOSE P FAST SERPL-MCNC: 135 MG/DL (ref 65–99)
HBA1C MFR BLD: 7 %
HCT VFR BLD AUTO: 42.1 % (ref 34.8–46.1)
HDLC SERPL-MCNC: 48 MG/DL
HGB BLD-MCNC: 14.2 G/DL (ref 11.5–15.4)
IMM GRANULOCYTES # BLD AUTO: 0.1 THOUSAND/UL (ref 0–0.2)
IMM GRANULOCYTES NFR BLD AUTO: 1 % (ref 0–2)
LDLC SERPL CALC-MCNC: 78 MG/DL (ref 0–100)
LYMPHOCYTES # BLD AUTO: 2.07 THOUSANDS/ÂΜL (ref 0.6–4.47)
LYMPHOCYTES NFR BLD AUTO: 28 % (ref 14–44)
MCH RBC QN AUTO: 29.7 PG (ref 26.8–34.3)
MCHC RBC AUTO-ENTMCNC: 33.7 G/DL (ref 31.4–37.4)
MCV RBC AUTO: 88 FL (ref 82–98)
MONOCYTES # BLD AUTO: 0.52 THOUSAND/ÂΜL (ref 0.17–1.22)
MONOCYTES NFR BLD AUTO: 7 % (ref 4–12)
NEUTROPHILS # BLD AUTO: 4.5 THOUSANDS/ÂΜL (ref 1.85–7.62)
NEUTS SEG NFR BLD AUTO: 61 % (ref 43–75)
NONHDLC SERPL-MCNC: 91 MG/DL
NRBC BLD AUTO-RTO: 0 /100 WBCS
PLATELET # BLD AUTO: 227 THOUSANDS/UL (ref 149–390)
PMV BLD AUTO: 10.9 FL (ref 8.9–12.7)
POTASSIUM SERPL-SCNC: 4.1 MMOL/L (ref 3.5–5.3)
PROT SERPL-MCNC: 6.8 G/DL (ref 6.4–8.4)
RBC # BLD AUTO: 4.78 MILLION/UL (ref 3.81–5.12)
SODIUM SERPL-SCNC: 139 MMOL/L (ref 135–147)
TRIGL SERPL-MCNC: 65 MG/DL (ref ?–150)
TSH SERPL DL<=0.05 MIU/L-ACNC: 2.54 UIU/ML (ref 0.45–4.5)
WBC # BLD AUTO: 7.45 THOUSAND/UL (ref 4.31–10.16)

## 2025-05-02 PROCEDURE — 83036 HEMOGLOBIN GLYCOSYLATED A1C: CPT

## 2025-05-02 PROCEDURE — 84443 ASSAY THYROID STIM HORMONE: CPT

## 2025-05-02 PROCEDURE — 80061 LIPID PANEL: CPT

## 2025-05-02 PROCEDURE — 36415 COLL VENOUS BLD VENIPUNCTURE: CPT

## 2025-05-02 PROCEDURE — 85025 COMPLETE CBC W/AUTO DIFF WBC: CPT

## 2025-05-02 PROCEDURE — 80053 COMPREHEN METABOLIC PANEL: CPT

## 2025-05-04 DIAGNOSIS — E03.9 ACQUIRED HYPOTHYROIDISM: ICD-10-CM

## 2025-05-04 RX ORDER — LEVOTHYROXINE SODIUM 112 UG/1
112 TABLET ORAL
Qty: 90 TABLET | Refills: 1 | Status: SHIPPED | OUTPATIENT
Start: 2025-05-04

## 2025-05-06 ENCOUNTER — OFFICE VISIT (OUTPATIENT)
Dept: FAMILY MEDICINE CLINIC | Facility: CLINIC | Age: 65
End: 2025-05-06
Payer: MEDICARE

## 2025-05-06 VITALS
OXYGEN SATURATION: 97 % | HEART RATE: 73 BPM | SYSTOLIC BLOOD PRESSURE: 120 MMHG | DIASTOLIC BLOOD PRESSURE: 80 MMHG | WEIGHT: 162.4 LBS | BODY MASS INDEX: 27.06 KG/M2 | TEMPERATURE: 98.6 F | HEIGHT: 65 IN

## 2025-05-06 DIAGNOSIS — N18.2 TYPE 2 DIABETES MELLITUS WITH STAGE 2 CHRONIC KIDNEY DISEASE, WITHOUT LONG-TERM CURRENT USE OF INSULIN  (HCC): Primary | ICD-10-CM

## 2025-05-06 DIAGNOSIS — E11.22 TYPE 2 DIABETES MELLITUS WITH STAGE 2 CHRONIC KIDNEY DISEASE, WITHOUT LONG-TERM CURRENT USE OF INSULIN  (HCC): Primary | ICD-10-CM

## 2025-05-06 DIAGNOSIS — E78.2 MIXED HYPERLIPIDEMIA: ICD-10-CM

## 2025-05-06 DIAGNOSIS — I47.10 SVT (SUPRAVENTRICULAR TACHYCARDIA) (HCC): ICD-10-CM

## 2025-05-06 DIAGNOSIS — F32.1 CURRENT MODERATE EPISODE OF MAJOR DEPRESSIVE DISORDER WITHOUT PRIOR EPISODE (HCC): ICD-10-CM

## 2025-05-06 DIAGNOSIS — K76.0 HEPATIC STEATOSIS: ICD-10-CM

## 2025-05-06 PROCEDURE — 99214 OFFICE O/P EST MOD 30 MIN: CPT | Performed by: FAMILY MEDICINE

## 2025-05-06 PROCEDURE — G2211 COMPLEX E/M VISIT ADD ON: HCPCS | Performed by: FAMILY MEDICINE

## 2025-05-06 RX ORDER — ATORVASTATIN CALCIUM 40 MG/1
40 TABLET, FILM COATED ORAL DAILY
Qty: 90 TABLET | Refills: 1 | Status: SHIPPED | OUTPATIENT
Start: 2025-05-06

## 2025-05-06 RX ORDER — FLUOXETINE 10 MG/1
10 CAPSULE ORAL DAILY
Qty: 30 CAPSULE | Refills: 2 | Status: SHIPPED | OUTPATIENT
Start: 2025-05-06

## 2025-05-08 ENCOUNTER — HOSPITAL ENCOUNTER (OUTPATIENT)
Dept: ULTRASOUND IMAGING | Facility: HOSPITAL | Age: 65
Discharge: HOME | End: 2025-05-08
Attending: FAMILY MEDICINE
Payer: MEDICARE

## 2025-05-08 DIAGNOSIS — K76.0 HEPATIC STEATOSIS: ICD-10-CM

## 2025-05-08 PROCEDURE — 76981 USE PARENCHYMA: CPT

## 2025-05-09 PROBLEM — F32.1 CURRENT MODERATE EPISODE OF MAJOR DEPRESSIVE DISORDER WITHOUT PRIOR EPISODE (HCC): Status: ACTIVE | Noted: 2025-05-09

## 2025-05-09 NOTE — PROGRESS NOTES
Name: Esha Oh      : 1960      MRN: [id number]  Encounter Provider: Shirley Almonte MD  Encounter Date: 2025   Encounter department: [facility]  :  Assessment & Plan  Type 2 diabetes mellitus with stage 2 chronic kidney disease, without long-term current use of insulin  (HCC)    Lab Results   Component Value Date    HGBA1C 7.0 (H) 2025   Chronic asymptomatic fair control improving the hemoglobin A1c compared with before continue current management low-carb diet important lose weight reviewed  Orders:  •  glucose blood test strip; Use as instructed    SVT (supraventricular tachycardia) (HCC)  Chronic rate is controlled follow-up with cardiology periodically       Hepatic steatosis  Finding on MRI of the abdomen discussed the patient results recommend control blood sugar important lose weight due for elastic ultrasound she agree  Orders:  •  US elastography/UGAP; Future    Current moderate episode of major depressive disorder without prior episode (HCC)  Depression Screening Follow-up Plan: Patient's depression screening was positive with a PHQ-2 score of 4. Their PHQ-9 score was 13.  New diagnosis symptomatic recommend to start Prozac 10 mg once a day proper use and possible side effects reviewed will reevaluate in 4 weeks        Orders:  •  FLUoxetine (PROzac) 10 mg capsule; Take 1 capsule (10 mg total) by mouth daily    Mixed hyperlipidemia    Orders:  •  atorvastatin (LIPITOR) 40 mg tablet; Take 1 tablet (40 mg total) by mouth daily      Assessment & Plan          Depression Screening and Follow-up Plan: Patient's depression screening was positive with a PHQ-2 score of 4. Their PHQ-9 score was 13.   Patient assessed for underlying major depression. Brief counseling provided and recommend additional follow-up/re-evaluation next office visit.       History of Present Illness   Patient here for follow-up with a chronic condition compliant with the medication  "tolerated well without side effect patient been feeling depressed feeling down and happy for the last couple months secondary to problem with her son affecting her quality of life and her sleeping recent blood work discussed with the patient      Review of Systems   Constitutional:  Negative for chills and fever.   HENT:  Negative for ear pain and sore throat.    Eyes:  Negative for pain and visual disturbance.   Respiratory:  Negative for cough and shortness of breath.    Cardiovascular:  Negative for chest pain and palpitations.   Gastrointestinal:  Negative for abdominal pain, constipation, diarrhea and vomiting.   Genitourinary:  Negative for dysuria and hematuria.   Musculoskeletal:  Negative for gait problem.   Skin:  Negative for color change and rash.   Neurological:  Negative for seizures and syncope.   Psychiatric/Behavioral:  Positive for decreased concentration and sleep disturbance. Negative for self-injury and suicidal ideas.    All other systems reviewed and are negative.      Objective   /80 (BP Location: Left arm, Patient Position: Sitting, Cuff Size: Standard)   Pulse 73   Temp 98.6 °F (37 °C) (Tympanic)   Ht 5' 4.72\" (1.644 m)   Wt 73.7 kg (162 lb 6.4 oz)   SpO2 97%   BMI 27.26 kg/m²      Physical Exam  Vitals and nursing note reviewed.   Constitutional:       General: She is not in acute distress.     Appearance: She is well-developed. She is not diaphoretic.   HENT:      Head: Normocephalic.      Right Ear: Tympanic membrane and external ear normal.      Left Ear: Tympanic membrane and external ear normal.      Nose: No rhinorrhea.      Mouth/Throat:      Pharynx: No posterior oropharyngeal erythema.   Eyes:      General:         Right eye: No discharge.         Left eye: No discharge.      Conjunctiva/sclera: Conjunctivae normal.   Neck:      Vascular: No JVD.   Cardiovascular:      Rate and Rhythm: Normal rate and regular rhythm.      Heart sounds: Normal heart sounds. No murmur " heard.     No gallop.   Pulmonary:      Effort: Pulmonary effort is normal. No respiratory distress.      Breath sounds: Normal breath sounds. No wheezing.   Abdominal:      General: There is no distension.      Palpations: Abdomen is soft.      Tenderness: There is no abdominal tenderness. There is no rebound.   Musculoskeletal:         General: No tenderness.      Cervical back: Normal range of motion and neck supple.   Lymphadenopathy:      Cervical: No cervical adenopathy.   Skin:     General: Skin is warm.      Findings: No rash.   Neurological:      Mental Status: She is alert and oriented to person, place, and time.

## 2025-05-09 NOTE — ASSESSMENT & PLAN NOTE
Lab Results   Component Value Date    HGBA1C 7.0 (H) 05/02/2025   Chronic asymptomatic fair control improving the hemoglobin A1c compared with before continue current management low-carb diet important lose weight reviewed  Orders:  •  glucose blood test strip; Use as instructed  
  Orders:  •  atorvastatin (LIPITOR) 40 mg tablet; Take 1 tablet (40 mg total) by mouth daily  
Chronic rate is controlled follow-up with cardiology periodically     
Depression Screening Follow-up Plan: Patient's depression screening was positive with a PHQ-2 score of 4. Their PHQ-9 score was 13.  New diagnosis symptomatic recommend to start Prozac 10 mg once a day proper use and possible side effects reviewed will reevaluate in 4 weeks        Orders:  •  FLUoxetine (PROzac) 10 mg capsule; Take 1 capsule (10 mg total) by mouth daily  
Finding on MRI of the abdomen discussed the patient results recommend control blood sugar important lose weight due for elastic ultrasound she agree  Orders:  •  US elastography/UGAP; Future  
.

## 2025-05-12 NOTE — ASSESSMENT & PLAN NOTE
Advice and education were given regarding nutrition, aerobic exercises, weight bearing exercises, cardiovascular risk reduction, fall risk reduction, and age appropriate supplements  The patient was counseled regarding instructions for management, risk factor reductions, prognosis, risks and benefits of treatment options, patient and family education, and importance of compliance with treatment       Patient declined flu shot for today Radha's callback: [phone]  Patient preferred [place] location, relayed she will receive a call back with date and time.      Referral for: NINO  Reason: Venous insufficiency   Refer by: KAITLYN Knott     Please assist.     : patient has [employer] insurance, please update when scheduling appt.

## 2025-05-13 DIAGNOSIS — E11.22 TYPE 2 DIABETES MELLITUS WITH STAGE 2 CHRONIC KIDNEY DISEASE, WITHOUT LONG-TERM CURRENT USE OF INSULIN  (HCC): ICD-10-CM

## 2025-05-13 DIAGNOSIS — E11.9 TYPE 2 DIABETES MELLITUS WITHOUT COMPLICATION, WITHOUT LONG-TERM CURRENT USE OF INSULIN (HCC): ICD-10-CM

## 2025-05-13 DIAGNOSIS — N18.2 TYPE 2 DIABETES MELLITUS WITH STAGE 2 CHRONIC KIDNEY DISEASE, WITHOUT LONG-TERM CURRENT USE OF INSULIN  (HCC): ICD-10-CM

## 2025-05-13 DIAGNOSIS — I47.10 SVT (SUPRAVENTRICULAR TACHYCARDIA) (HCC): ICD-10-CM

## 2025-05-13 RX ORDER — METOPROLOL SUCCINATE 25 MG/1
25 TABLET, EXTENDED RELEASE ORAL DAILY
Qty: 90 TABLET | Refills: 1 | Status: SHIPPED | OUTPATIENT
Start: 2025-05-13

## 2025-05-13 NOTE — TELEPHONE ENCOUNTER
Requested medication(s) are due for refill today: Yes  **If antibiotic or given during sick visit, contact patient to discuss current symptoms.   **Confirm prescribing provider    LOV:  5/6/2025  **If longer then 1 year, contact patient to schedule annual PRIOR to refilling. Once scheduled, adjust refill for 30 days, no refills.  **Update CareEverywhere to confirm not being seen elsewhere    NOV:  6/9/2025    Is patient due for annual visit: No  **If future appointment, adjust to annual/follow up.  ** No appointment call to schedule annual/follow up.    Route to PCP, unless PCP no longer here, then physician they are seeing next.

## 2025-05-14 RX ORDER — LANCETS 33 GAUGE
EACH MISCELLANEOUS
Qty: 100 EACH | Refills: 3 | Status: SHIPPED | OUTPATIENT
Start: 2025-05-14

## 2025-06-10 ENCOUNTER — OFFICE VISIT (OUTPATIENT)
Dept: FAMILY MEDICINE CLINIC | Facility: CLINIC | Age: 65
End: 2025-06-10
Payer: MEDICARE

## 2025-06-10 VITALS
OXYGEN SATURATION: 96 % | SYSTOLIC BLOOD PRESSURE: 116 MMHG | WEIGHT: 162 LBS | TEMPERATURE: 97.7 F | HEIGHT: 65 IN | BODY MASS INDEX: 26.99 KG/M2 | DIASTOLIC BLOOD PRESSURE: 72 MMHG | HEART RATE: 74 BPM

## 2025-06-10 DIAGNOSIS — E55.9 VITAMIN D DEFICIENCY: ICD-10-CM

## 2025-06-10 DIAGNOSIS — J45.20 MILD INTERMITTENT ASTHMA WITHOUT COMPLICATION: Primary | ICD-10-CM

## 2025-06-10 DIAGNOSIS — F32.1 CURRENT MODERATE EPISODE OF MAJOR DEPRESSIVE DISORDER WITHOUT PRIOR EPISODE (HCC): ICD-10-CM

## 2025-06-10 DIAGNOSIS — M54.2 NECK PAIN: ICD-10-CM

## 2025-06-10 PROCEDURE — G2211 COMPLEX E/M VISIT ADD ON: HCPCS | Performed by: FAMILY MEDICINE

## 2025-06-10 PROCEDURE — 99214 OFFICE O/P EST MOD 30 MIN: CPT | Performed by: FAMILY MEDICINE

## 2025-06-10 RX ORDER — METHOCARBAMOL 500 MG/1
500 TABLET, FILM COATED ORAL 2 TIMES DAILY
Qty: 20 TABLET | Refills: 0 | Status: SHIPPED | OUTPATIENT
Start: 2025-06-10

## 2025-06-10 RX ORDER — ALBUTEROL SULFATE 90 UG/1
2 INHALANT RESPIRATORY (INHALATION) EVERY 6 HOURS PRN
Qty: 18 G | Refills: 1 | Status: SHIPPED | OUTPATIENT
Start: 2025-06-10

## 2025-06-11 PROBLEM — M54.2 NECK PAIN: Status: ACTIVE | Noted: 2025-06-11

## 2025-06-11 NOTE — PROGRESS NOTES
Name: Esha Oh      : 1960      MRN: [id number]  Encounter Provider: Shirley Almonte MD  Encounter Date: 6/10/2025   Encounter department: [facility] PR[facility]  :  Assessment & Plan  Mild intermittent asthma without complication  Chronic asymptomatic no recent exacerbation or hospitalization refill on the albuterol inhaler has been given proper use review  Orders:  •  albuterol (Ventolin HFA) 90 mcg/act inhaler; Inhale 2 puffs every 6 (six) hours as needed for wheezing    Neck pain  New diagnosis symptomatic secondary to muscle spasm recommend Robaxin 500 to twice a day proper use review  Orders:  •  methocarbamol (ROBAXIN) 500 mg tablet; Take 1 tablet (500 mg total) by mouth 2 (two) times a day    Current moderate episode of major depressive disorder without prior episode (HCC)  Chronic patient stop using Prozac she feels she went to manage it conservatively she does not want to be on medication recommend therapy       Vitamin D deficiency  Chronic asymptomatic encourage patient to continue vitamin D supplement to due for vitamin D level before next appointment         Assessment & Plan           History of Present Illness   Patient here for follow-up she was diagnosed with depression started on Prozac 10 mg patient took it for week and then she decided to stop it she feels she does not need it and she would like to treated conservatively patient developed neck pain pain radiated to bilateral upper shoulder but no numbness no muscle weakness no rash no drop objects patient has history of vitamin D deficiency has been taking vitamin D supplement daily      Review of Systems   Constitutional:  Negative for activity change, appetite change, fatigue and fever.   HENT:  Negative for congestion, ear pain, sinus pressure, sinus pain and sore throat.    Eyes:  Negative for discharge and redness.   Respiratory:  Negative for cough, chest tightness and shortness of breath.    Cardiovascular:   "Negative for chest pain, palpitations and leg swelling.   Gastrointestinal:  Negative for abdominal pain, constipation and diarrhea.   Genitourinary:  Negative for dysuria, flank pain, frequency and hematuria.   Musculoskeletal:  Positive for neck pain. Negative for gait problem.   Skin:  Negative for pallor and rash.   Neurological:  Negative for dizziness, tremors, weakness, numbness and headaches.   Hematological:  Does not bruise/bleed easily.       Objective   /72   Pulse 74   Temp 97.7 °F (36.5 °C) (Tympanic)   Ht 5' 4.96\" (1.65 m)   Wt 73.5 kg (162 lb)   SpO2 96%   BMI 26.99 kg/m²      Physical Exam  Vitals and nursing note reviewed.   Constitutional:       General: She is not in acute distress.     Appearance: She is well-developed. She is not diaphoretic.   HENT:      Head: Normocephalic.      Right Ear: Tympanic membrane and external ear normal.      Left Ear: Tympanic membrane and external ear normal.      Nose: No rhinorrhea.      Mouth/Throat:      Pharynx: No posterior oropharyngeal erythema.     Eyes:      General:         Right eye: No discharge.         Left eye: No discharge.      Conjunctiva/sclera: Conjunctivae normal.     Neck:      Vascular: No JVD.     Cardiovascular:      Rate and Rhythm: Normal rate and regular rhythm.      Heart sounds: Normal heart sounds. No murmur heard.     No gallop.   Pulmonary:      Effort: Pulmonary effort is normal. No respiratory distress.      Breath sounds: Normal breath sounds. No wheezing.   Abdominal:      General: There is no distension.      Palpations: Abdomen is soft.      Tenderness: There is no abdominal tenderness. There is no rebound.     Musculoskeletal:         General: No tenderness.      Cervical back: Normal range of motion and neck supple. Spasms present. No signs of trauma or bony tenderness.   Lymphadenopathy:      Cervical: No cervical adenopathy.     Skin:     General: Skin is warm.      Findings: No rash.     Neurological:      " Mental Status: She is alert and oriented to person, place, and time.

## 2025-06-11 NOTE — ASSESSMENT & PLAN NOTE
Chronic patient stop using Prozac she feels she went to manage it conservatively she does not want to be on medication recommend therapy

## 2025-06-11 NOTE — ASSESSMENT & PLAN NOTE
New diagnosis symptomatic secondary to muscle spasm recommend Robaxin 500 to twice a day proper use review  Orders:  •  methocarbamol (ROBAXIN) 500 mg tablet; Take 1 tablet (500 mg total) by mouth 2 (two) times a day

## 2025-06-11 NOTE — ASSESSMENT & PLAN NOTE
Chronic asymptomatic encourage patient to continue vitamin D supplement to due for vitamin D level before next appointment

## 2025-06-11 NOTE — ASSESSMENT & PLAN NOTE
Chronic asymptomatic no recent exacerbation or hospitalization refill on the albuterol inhaler has been given proper use review  Orders:  •  albuterol (Ventolin HFA) 90 mcg/act inhaler; Inhale 2 puffs every 6 (six) hours as needed for wheezing

## 2025-07-16 DIAGNOSIS — N18.2 TYPE 2 DIABETES MELLITUS WITH STAGE 2 CHRONIC KIDNEY DISEASE, WITHOUT LONG-TERM CURRENT USE OF INSULIN  (HCC): ICD-10-CM

## 2025-07-16 DIAGNOSIS — E11.22 TYPE 2 DIABETES MELLITUS WITH STAGE 2 CHRONIC KIDNEY DISEASE, WITHOUT LONG-TERM CURRENT USE OF INSULIN  (HCC): ICD-10-CM

## 2025-07-17 RX ORDER — METFORMIN HYDROCHLORIDE 500 MG/1
500 TABLET, EXTENDED RELEASE ORAL
Qty: 90 TABLET | Refills: 1 | Status: SHIPPED | OUTPATIENT
Start: 2025-07-17

## 2025-08-07 LAB
LEFT EYE DIABETIC RETINOPATHY: NORMAL
RIGHT EYE DIABETIC RETINOPATHY: NORMAL

## 2025-08-19 ENCOUNTER — OFFICE VISIT (OUTPATIENT)
Dept: FAMILY MEDICINE CLINIC | Facility: CLINIC | Age: 65
End: 2025-08-19
Payer: MEDICARE

## 2025-08-19 VITALS
TEMPERATURE: 96.2 F | HEIGHT: 64 IN | DIASTOLIC BLOOD PRESSURE: 70 MMHG | OXYGEN SATURATION: 97 % | SYSTOLIC BLOOD PRESSURE: 110 MMHG | HEART RATE: 74 BPM | BODY MASS INDEX: 27.49 KG/M2 | WEIGHT: 161 LBS

## 2025-08-19 DIAGNOSIS — Z28.21 IMMUNIZATION REFUSED: ICD-10-CM

## 2025-08-19 DIAGNOSIS — Z00.00 MEDICARE ANNUAL WELLNESS VISIT, SUBSEQUENT: Primary | ICD-10-CM

## 2025-08-19 DIAGNOSIS — R10.13 DYSPEPSIA: ICD-10-CM

## 2025-08-19 DIAGNOSIS — N18.2 TYPE 2 DIABETES MELLITUS WITH STAGE 2 CHRONIC KIDNEY DISEASE, WITHOUT LONG-TERM CURRENT USE OF INSULIN  (HCC): ICD-10-CM

## 2025-08-19 DIAGNOSIS — E11.22 TYPE 2 DIABETES MELLITUS WITH STAGE 2 CHRONIC KIDNEY DISEASE, WITHOUT LONG-TERM CURRENT USE OF INSULIN  (HCC): ICD-10-CM

## 2025-08-19 DIAGNOSIS — E66.3 OVERWEIGHT WITH BODY MASS INDEX (BMI) OF 27 TO 27.9 IN ADULT: ICD-10-CM

## 2025-08-19 DIAGNOSIS — I10 ESSENTIAL HYPERTENSION: ICD-10-CM

## 2025-08-19 DIAGNOSIS — B35.9 TINEA: ICD-10-CM

## 2025-08-19 DIAGNOSIS — R42 VERTIGO: ICD-10-CM

## 2025-08-19 LAB — SL AMB POCT HEMOGLOBIN AIC: 6.9 (ref ?–6.5)

## 2025-08-19 PROCEDURE — 83036 HEMOGLOBIN GLYCOSYLATED A1C: CPT | Performed by: FAMILY MEDICINE

## 2025-08-19 PROCEDURE — G0537 PR RISK ASCVD TST ONCE PR 12 MO: HCPCS | Performed by: FAMILY MEDICINE

## 2025-08-19 PROCEDURE — G0439 PPPS, SUBSEQ VISIT: HCPCS | Performed by: FAMILY MEDICINE

## 2025-08-19 PROCEDURE — 99214 OFFICE O/P EST MOD 30 MIN: CPT | Performed by: FAMILY MEDICINE

## 2025-08-19 PROCEDURE — G2211 COMPLEX E/M VISIT ADD ON: HCPCS | Performed by: FAMILY MEDICINE

## 2025-08-19 RX ORDER — BISACODYL 5 MG
TABLET, DELAYED RELEASE (ENTERIC COATED) ORAL
COMMUNITY
Start: 2025-08-13

## 2025-08-19 RX ORDER — MECLIZINE HYDROCHLORIDE 25 MG/1
25 TABLET ORAL
Qty: 30 TABLET | Refills: 0 | Status: SHIPPED | OUTPATIENT
Start: 2025-08-19

## 2025-08-19 RX ORDER — POLYETHYLENE GLYCOL-3350 AND ELECTROLYTES WITH FLAVOR PACK 240; 5.84; 2.98; 6.72; 22.72 G/278.26G; G/278.26G; G/278.26G; G/278.26G; G/278.26G
POWDER, FOR SOLUTION ORAL
COMMUNITY
Start: 2025-08-13

## 2025-08-19 RX ORDER — CLOTRIMAZOLE AND BETAMETHASONE DIPROPIONATE 10; .64 MG/G; MG/G
CREAM TOPICAL 2 TIMES DAILY
Qty: 45 G | Refills: 1 | Status: SHIPPED | OUTPATIENT
Start: 2025-08-19

## 2025-08-19 RX ORDER — OMEPRAZOLE 40 MG/1
40 CAPSULE, DELAYED RELEASE ORAL DAILY
Qty: 90 CAPSULE | Refills: 1 | Status: SHIPPED | OUTPATIENT
Start: 2025-08-19 | End: 2026-02-15

## 2025-08-20 DIAGNOSIS — E11.22 TYPE 2 DIABETES MELLITUS WITH STAGE 2 CHRONIC KIDNEY DISEASE, WITHOUT LONG-TERM CURRENT USE OF INSULIN  (HCC): ICD-10-CM

## 2025-08-20 DIAGNOSIS — N18.2 TYPE 2 DIABETES MELLITUS WITH STAGE 2 CHRONIC KIDNEY DISEASE, WITHOUT LONG-TERM CURRENT USE OF INSULIN  (HCC): ICD-10-CM

## 2025-08-21 RX ORDER — BLOOD SUGAR DIAGNOSTIC
STRIP MISCELLANEOUS
Qty: 100 STRIP | Refills: 2 | Status: SHIPPED | OUTPATIENT
Start: 2025-08-21

## (undated) DEVICE — SUT STRATAFIX SPIRAL PDS PLUS 0 CT-2 30CM SXPP1B405

## (undated) DEVICE — SPONGE 4 X 4 XRAY 16 PLY STRL LF RFD

## (undated) DEVICE — COLUMN DRAPE

## (undated) DEVICE — [HIGH FLOW INSUFFLATOR,  DO NOT USE IF PACKAGE IS DAMAGED,  KEEP DRY,  KEEP AWAY FROM SUNLIGHT,  PROTECT FROM HEAT AND RADIOACTIVE SOURCES.]: Brand: PNEUMOSURE

## (undated) DEVICE — 3M™ TEGADERM™ TRANSPARENT FILM DRESSING FRAME STYLE, 1626W, 4 IN X 4-3/4 IN (10 CM X 12 CM), 50/CT 4CT/CASE: Brand: 3M™ TEGADERM™

## (undated) DEVICE — MEGA SUTURECUT ND: Brand: ENDOWRIST

## (undated) DEVICE — DRAPE SHEET X-LG

## (undated) DEVICE — NEEDLE HYPO 22G X 1-1/2 IN

## (undated) DEVICE — BLADELESS OBTURATOR: Brand: WECK VISTA

## (undated) DEVICE — ALLENTOWN LAP CHOLE APP PACK: Brand: CARDINAL HEALTH

## (undated) DEVICE — ADHESIVE SKN CLSR HISTOACRYL FLEX 0.5ML LF

## (undated) DEVICE — GLOVE INDICATOR PI UNDERGLOVE SZ 7.5 BLUE

## (undated) DEVICE — GLOVE PI ULTRA TOUCH SZ.7.5

## (undated) DEVICE — GLOVE SRG BIOGEL 7

## (undated) DEVICE — PMI DISPOSABLE PUNCTURE CLOSURE DEVICE / SUTURE GRASPER: Brand: PMI

## (undated) DEVICE — 3M™ STERI-STRIP™ COMPOUND BENZOIN TINCTURE 40 BAGS/CARTON 4 CARTONS/CASE C1544: Brand: 3M™ STERI-STRIP™

## (undated) DEVICE — TROCAR: Brand: KII FIOS FIRST ENTRY

## (undated) DEVICE — CADIERE FORCEPS: Brand: ENDOWRIST

## (undated) DEVICE — REDUCER: Brand: ENDOWRIST

## (undated) DEVICE — TIP COVER ACCESSORY

## (undated) DEVICE — TRAY FOLEY 16FR URIMETER SURESTEP

## (undated) DEVICE — CHLORAPREP HI-LITE 26ML ORANGE

## (undated) DEVICE — TELFA NON-ADHERENT ABSORBENT DRESSING: Brand: TELFA

## (undated) DEVICE — ABSORBABLE WOUND CLOSURE DEVICE: Brand: V-LOC 90

## (undated) DEVICE — SUT MONOCRYL 4-0 PS-2 27 IN Y426H

## (undated) DEVICE — LUBRICANT INST ELECTROLUBE ANTISTK WO PAD

## (undated) DEVICE — 3M™ STERI-STRIP™ REINFORCED ADHESIVE SKIN CLOSURES, R1547, 1/2 IN X 4 IN (12 MM X 100 MM), 6 STRIPS/ENVELOPE: Brand: 3M™ STERI-STRIP™

## (undated) DEVICE — SEAL

## (undated) DEVICE — SUT VICRYL 2-0 SH 27 IN UNDYED J417H

## (undated) DEVICE — SCD SEQUENTIAL COMPRESSION COMFORT SLEEVE MEDIUM KNEE LENGTH: Brand: KENDALL SCD

## (undated) DEVICE — INTENDED FOR TISSUE SEPARATION, AND OTHER PROCEDURES THAT REQUIRE A SHARP SURGICAL BLADE TO PUNCTURE OR CUT.: Brand: BARD-PARKER SAFETY BLADES SIZE 15, STERILE

## (undated) DEVICE — ARM DRAPE

## (undated) DEVICE — SUT PDS II 1 CTX 36 IN Z371T

## (undated) DEVICE — SPONGE LAP 18 X 18 IN STRL RFD

## (undated) DEVICE — GLOVE INDICATOR PI UNDERGLOVE SZ 6.5 BLUE

## (undated) DEVICE — ASTOUND STANDARD SURGICAL GOWN, XL: Brand: CONVERTORS

## (undated) DEVICE — MONOPOLAR CURVED SCISSORS: Brand: ENDOWRIST

## (undated) DEVICE — GLOVE SRG BIOGEL 6.5

## (undated) DEVICE — SUT VLOC 90 2-0 GS-22 12 IN VLOCM2115

## (undated) DEVICE — DRAPE EQUIPMENT RF WAND

## (undated) DEVICE — GLOVE INDICATOR PI UNDERGLOVE SZ 7 BLUE